# Patient Record
Sex: MALE | Race: WHITE | NOT HISPANIC OR LATINO
[De-identification: names, ages, dates, MRNs, and addresses within clinical notes are randomized per-mention and may not be internally consistent; named-entity substitution may affect disease eponyms.]

---

## 2017-06-02 ENCOUNTER — APPOINTMENT (OUTPATIENT)
Dept: RADIOLOGY | Facility: HOSPITAL | Age: 72
End: 2017-06-02

## 2017-06-02 ENCOUNTER — OUTPATIENT (OUTPATIENT)
Dept: OUTPATIENT SERVICES | Facility: HOSPITAL | Age: 72
LOS: 1 days | End: 2017-06-02
Payer: COMMERCIAL

## 2017-06-02 PROCEDURE — 74000: CPT | Mod: 26

## 2017-06-02 PROCEDURE — 74018 RADEX ABDOMEN 1 VIEW: CPT

## 2017-06-15 ENCOUNTER — OUTPATIENT (OUTPATIENT)
Dept: OUTPATIENT SERVICES | Facility: HOSPITAL | Age: 72
LOS: 1 days | End: 2017-06-15
Payer: COMMERCIAL

## 2017-06-15 ENCOUNTER — APPOINTMENT (OUTPATIENT)
Dept: CT IMAGING | Facility: HOSPITAL | Age: 72
End: 2017-06-15

## 2017-06-15 PROCEDURE — 74176 CT ABD & PELVIS W/O CONTRAST: CPT

## 2017-12-01 ENCOUNTER — APPOINTMENT (OUTPATIENT)
Dept: NEUROLOGY | Facility: CLINIC | Age: 72
End: 2017-12-01
Payer: COMMERCIAL

## 2017-12-01 VITALS
BODY MASS INDEX: 24.78 KG/M2 | HEIGHT: 66.5 IN | HEART RATE: 79 BPM | WEIGHT: 156 LBS | SYSTOLIC BLOOD PRESSURE: 118 MMHG | DIASTOLIC BLOOD PRESSURE: 78 MMHG | OXYGEN SATURATION: 98 %

## 2017-12-01 DIAGNOSIS — Z78.9 OTHER SPECIFIED HEALTH STATUS: ICD-10-CM

## 2017-12-01 DIAGNOSIS — Z87.438 PERSONAL HISTORY OF OTHER DISEASES OF MALE GENITAL ORGANS: ICD-10-CM

## 2017-12-01 DIAGNOSIS — Z82.0 FAMILY HISTORY OF EPILEPSY AND OTHER DISEASES OF THE NERVOUS SYSTEM: ICD-10-CM

## 2017-12-01 PROCEDURE — 99244 OFF/OP CNSLTJ NEW/EST MOD 40: CPT

## 2017-12-01 RX ORDER — TAMSULOSIN HYDROCHLORIDE 0.4 MG/1
0.4 CAPSULE ORAL
Refills: 0 | Status: ACTIVE | COMMUNITY

## 2017-12-04 LAB
T4 SERPL-MCNC: 7.4 UG/DL
TSH SERPL-ACNC: 1.66 UIU/ML

## 2017-12-11 ENCOUNTER — FORM ENCOUNTER (OUTPATIENT)
Age: 72
End: 2017-12-11

## 2017-12-12 ENCOUNTER — OUTPATIENT (OUTPATIENT)
Dept: OUTPATIENT SERVICES | Facility: HOSPITAL | Age: 72
LOS: 1 days | End: 2017-12-12
Payer: COMMERCIAL

## 2017-12-12 ENCOUNTER — APPOINTMENT (OUTPATIENT)
Dept: MRI IMAGING | Facility: HOSPITAL | Age: 72
End: 2017-12-12

## 2017-12-12 DIAGNOSIS — Z00.8 ENCOUNTER FOR OTHER GENERAL EXAMINATION: ICD-10-CM

## 2017-12-12 PROCEDURE — 70551 MRI BRAIN STEM W/O DYE: CPT

## 2017-12-12 PROCEDURE — 70551 MRI BRAIN STEM W/O DYE: CPT | Mod: 26

## 2018-02-01 ENCOUNTER — OUTPATIENT (OUTPATIENT)
Dept: OUTPATIENT SERVICES | Facility: HOSPITAL | Age: 73
LOS: 1 days | End: 2018-02-01
Payer: COMMERCIAL

## 2018-02-01 DIAGNOSIS — N21.0 CALCULUS IN BLADDER: ICD-10-CM

## 2018-02-01 DIAGNOSIS — Z01.818 ENCOUNTER FOR OTHER PREPROCEDURAL EXAMINATION: ICD-10-CM

## 2018-02-01 PROCEDURE — 85025 COMPLETE CBC W/AUTO DIFF WBC: CPT

## 2018-02-01 PROCEDURE — 93010 ELECTROCARDIOGRAM REPORT: CPT | Mod: NC

## 2018-02-01 PROCEDURE — G0463: CPT

## 2018-02-01 PROCEDURE — 36415 COLL VENOUS BLD VENIPUNCTURE: CPT

## 2018-02-01 PROCEDURE — 81003 URINALYSIS AUTO W/O SCOPE: CPT

## 2018-02-01 PROCEDURE — 87086 URINE CULTURE/COLONY COUNT: CPT

## 2018-02-01 PROCEDURE — 80048 BASIC METABOLIC PNL TOTAL CA: CPT

## 2018-02-01 PROCEDURE — 93005 ELECTROCARDIOGRAM TRACING: CPT

## 2018-02-06 ENCOUNTER — RX RENEWAL (OUTPATIENT)
Age: 73
End: 2018-02-06

## 2018-02-06 ENCOUNTER — APPOINTMENT (OUTPATIENT)
Dept: NEUROLOGY | Facility: CLINIC | Age: 73
End: 2018-02-06
Payer: COMMERCIAL

## 2018-02-06 VITALS
DIASTOLIC BLOOD PRESSURE: 76 MMHG | HEIGHT: 66.5 IN | HEART RATE: 64 BPM | SYSTOLIC BLOOD PRESSURE: 120 MMHG | BODY MASS INDEX: 23.82 KG/M2 | WEIGHT: 150 LBS | OXYGEN SATURATION: 98 %

## 2018-02-06 PROCEDURE — 99214 OFFICE O/P EST MOD 30 MIN: CPT

## 2018-02-08 ENCOUNTER — RX RENEWAL (OUTPATIENT)
Age: 73
End: 2018-02-08

## 2018-02-13 ENCOUNTER — RESULT REVIEW (OUTPATIENT)
Age: 73
End: 2018-02-13

## 2018-02-13 ENCOUNTER — OUTPATIENT (OUTPATIENT)
Dept: OUTPATIENT SERVICES | Facility: HOSPITAL | Age: 73
LOS: 1 days | End: 2018-02-13
Payer: COMMERCIAL

## 2018-02-13 DIAGNOSIS — N21.0 CALCULUS IN BLADDER: ICD-10-CM

## 2018-02-13 PROCEDURE — 88300 SURGICAL PATH GROSS: CPT | Mod: 26

## 2018-02-13 PROCEDURE — 82365 CALCULUS SPECTROSCOPY: CPT

## 2018-02-13 PROCEDURE — 88300 SURGICAL PATH GROSS: CPT

## 2018-02-13 PROCEDURE — 52317 REMOVE BLADDER STONE: CPT

## 2018-02-14 ENCOUNTER — TRANSCRIPTION ENCOUNTER (OUTPATIENT)
Age: 73
End: 2018-02-14

## 2018-04-06 ENCOUNTER — APPOINTMENT (OUTPATIENT)
Dept: NEUROLOGY | Facility: CLINIC | Age: 73
End: 2018-04-06

## 2018-08-26 ENCOUNTER — EMERGENCY (EMERGENCY)
Facility: HOSPITAL | Age: 73
LOS: 1 days | Discharge: HOME | End: 2018-08-26
Attending: EMERGENCY MEDICINE | Admitting: EMERGENCY MEDICINE
Payer: COMMERCIAL

## 2018-08-26 VITALS
OXYGEN SATURATION: 96 % | WEIGHT: 174.17 LBS | HEART RATE: 69 BPM | RESPIRATION RATE: 18 BRPM | TEMPERATURE: 98 F | DIASTOLIC BLOOD PRESSURE: 74 MMHG | HEIGHT: 67 IN | SYSTOLIC BLOOD PRESSURE: 133 MMHG

## 2018-08-26 VITALS
DIASTOLIC BLOOD PRESSURE: 70 MMHG | SYSTOLIC BLOOD PRESSURE: 125 MMHG | HEART RATE: 65 BPM | TEMPERATURE: 98 F | RESPIRATION RATE: 18 BRPM | OXYGEN SATURATION: 99 %

## 2018-08-26 LAB
ALBUMIN SERPL ELPH-MCNC: 4 G/DL — SIGNIFICANT CHANGE UP (ref 3.3–5)
ALP SERPL-CCNC: 80 U/L — SIGNIFICANT CHANGE UP (ref 40–120)
ALT FLD-CCNC: 36 U/L DA — SIGNIFICANT CHANGE UP (ref 10–45)
ANION GAP SERPL CALC-SCNC: 9 MMOL/L — SIGNIFICANT CHANGE UP (ref 5–17)
AST SERPL-CCNC: 22 U/L — SIGNIFICANT CHANGE UP (ref 10–40)
BASOPHILS # BLD AUTO: 0.1 K/UL — SIGNIFICANT CHANGE UP (ref 0–0.2)
BASOPHILS NFR BLD AUTO: 1.3 % — SIGNIFICANT CHANGE UP (ref 0–2)
BILIRUB SERPL-MCNC: 0.5 MG/DL — SIGNIFICANT CHANGE UP (ref 0.2–1.2)
BUN SERPL-MCNC: 21 MG/DL — SIGNIFICANT CHANGE UP (ref 7–23)
CALCIUM SERPL-MCNC: 9.1 MG/DL — SIGNIFICANT CHANGE UP (ref 8.4–10.5)
CHLORIDE SERPL-SCNC: 106 MMOL/L — SIGNIFICANT CHANGE UP (ref 96–108)
CO2 SERPL-SCNC: 26 MMOL/L — SIGNIFICANT CHANGE UP (ref 22–31)
CREAT SERPL-MCNC: 1.35 MG/DL — HIGH (ref 0.5–1.3)
EOSINOPHIL # BLD AUTO: 0.1 K/UL — SIGNIFICANT CHANGE UP (ref 0–0.5)
EOSINOPHIL NFR BLD AUTO: 1.8 % — SIGNIFICANT CHANGE UP (ref 0–6)
ETHANOL SERPL-MCNC: 211 MG/DL — HIGH (ref 0–3)
GLUCOSE SERPL-MCNC: 108 MG/DL — HIGH (ref 70–99)
HCT VFR BLD CALC: 43.6 % — SIGNIFICANT CHANGE UP (ref 39–50)
HGB BLD-MCNC: 14.5 G/DL — SIGNIFICANT CHANGE UP (ref 13–17)
LYMPHOCYTES # BLD AUTO: 1.5 K/UL — SIGNIFICANT CHANGE UP (ref 1–3.3)
LYMPHOCYTES # BLD AUTO: 29.2 % — SIGNIFICANT CHANGE UP (ref 13–44)
MCHC RBC-ENTMCNC: 29.4 PG — SIGNIFICANT CHANGE UP (ref 27–34)
MCHC RBC-ENTMCNC: 33.3 GM/DL — SIGNIFICANT CHANGE UP (ref 32–36)
MCV RBC AUTO: 88.3 FL — SIGNIFICANT CHANGE UP (ref 80–100)
MONOCYTES # BLD AUTO: 0.5 K/UL — SIGNIFICANT CHANGE UP (ref 0–0.9)
MONOCYTES NFR BLD AUTO: 10.4 % — SIGNIFICANT CHANGE UP (ref 2–14)
NEUTROPHILS # BLD AUTO: 3 K/UL — SIGNIFICANT CHANGE UP (ref 1.8–7.4)
NEUTROPHILS NFR BLD AUTO: 57.3 % — SIGNIFICANT CHANGE UP (ref 43–77)
PLATELET # BLD AUTO: 118 K/UL — LOW (ref 150–400)
POTASSIUM SERPL-MCNC: 3.6 MMOL/L — SIGNIFICANT CHANGE UP (ref 3.5–5.3)
POTASSIUM SERPL-SCNC: 3.6 MMOL/L — SIGNIFICANT CHANGE UP (ref 3.5–5.3)
PROT SERPL-MCNC: 7.5 G/DL — SIGNIFICANT CHANGE UP (ref 6–8.3)
RBC # BLD: 4.94 M/UL — SIGNIFICANT CHANGE UP (ref 4.2–5.8)
RBC # FLD: 12.7 % — SIGNIFICANT CHANGE UP (ref 10.3–14.5)
SODIUM SERPL-SCNC: 141 MMOL/L — SIGNIFICANT CHANGE UP (ref 135–145)
TROPONIN I SERPL-MCNC: <.017 NG/ML — LOW (ref 0.02–0.06)
WBC # BLD: 5.2 K/UL — SIGNIFICANT CHANGE UP (ref 3.8–10.5)
WBC # FLD AUTO: 5.2 K/UL — SIGNIFICANT CHANGE UP (ref 3.8–10.5)

## 2018-08-26 PROCEDURE — 84484 ASSAY OF TROPONIN QUANT: CPT

## 2018-08-26 PROCEDURE — 93010 ELECTROCARDIOGRAM REPORT: CPT

## 2018-08-26 PROCEDURE — 99285 EMERGENCY DEPT VISIT HI MDM: CPT | Mod: 25

## 2018-08-26 PROCEDURE — 80307 DRUG TEST PRSMV CHEM ANLYZR: CPT

## 2018-08-26 PROCEDURE — 93005 ELECTROCARDIOGRAM TRACING: CPT

## 2018-08-26 PROCEDURE — 85027 COMPLETE CBC AUTOMATED: CPT

## 2018-08-26 PROCEDURE — 99284 EMERGENCY DEPT VISIT MOD MDM: CPT | Mod: 25

## 2018-08-26 PROCEDURE — 70450 CT HEAD/BRAIN W/O DYE: CPT | Mod: 26

## 2018-08-26 PROCEDURE — 70450 CT HEAD/BRAIN W/O DYE: CPT

## 2018-08-26 PROCEDURE — 80053 COMPREHEN METABOLIC PANEL: CPT

## 2018-08-26 RX ORDER — SODIUM CHLORIDE 9 MG/ML
3 INJECTION INTRAMUSCULAR; INTRAVENOUS; SUBCUTANEOUS ONCE
Qty: 0 | Refills: 0 | Status: COMPLETED | OUTPATIENT
Start: 2018-08-26 | End: 2018-08-26

## 2018-08-26 RX ADMIN — SODIUM CHLORIDE 3 MILLILITER(S): 9 INJECTION INTRAMUSCULAR; INTRAVENOUS; SUBCUTANEOUS at 04:33

## 2018-08-26 NOTE — ED PROVIDER NOTE - MEDICAL DECISION MAKING DETAILS
pt with etoh intoxication and questionable history of syncope, pt with bifasicular block on ekg which is old and two neg trops

## 2018-08-26 NOTE — ED PROVIDER NOTE - OBJECTIVE STATEMENT
72 year old M was found at the bottom of stairs after drinking.  Pt's wife is not sure if he synopsized.  Pt's wife is unsure of the circumstances.  Pt is without acute complaints.

## 2019-05-27 ENCOUNTER — EMERGENCY (EMERGENCY)
Facility: HOSPITAL | Age: 74
LOS: 1 days | Discharge: HOME | End: 2019-05-27
Attending: EMERGENCY MEDICINE | Admitting: EMERGENCY MEDICINE
Payer: COMMERCIAL

## 2019-05-27 VITALS
OXYGEN SATURATION: 97 % | RESPIRATION RATE: 17 BRPM | HEIGHT: 66 IN | DIASTOLIC BLOOD PRESSURE: 77 MMHG | SYSTOLIC BLOOD PRESSURE: 138 MMHG | HEART RATE: 82 BPM | WEIGHT: 149.91 LBS | TEMPERATURE: 99 F

## 2019-05-27 DIAGNOSIS — R55 SYNCOPE AND COLLAPSE: ICD-10-CM

## 2019-05-27 LAB
ALBUMIN SERPL ELPH-MCNC: 3.9 G/DL — SIGNIFICANT CHANGE UP (ref 3.3–5)
ALP SERPL-CCNC: 70 U/L — SIGNIFICANT CHANGE UP (ref 40–120)
ALT FLD-CCNC: 44 U/L DA — SIGNIFICANT CHANGE UP (ref 10–45)
ANION GAP SERPL CALC-SCNC: 12 MMOL/L — SIGNIFICANT CHANGE UP (ref 5–17)
APTT BLD: 27 SEC — LOW (ref 27.5–36.3)
AST SERPL-CCNC: 66 U/L — HIGH (ref 10–40)
BILIRUB SERPL-MCNC: 2.2 MG/DL — HIGH (ref 0.2–1.2)
BUN SERPL-MCNC: 24 MG/DL — HIGH (ref 7–23)
CALCIUM SERPL-MCNC: 9 MG/DL — SIGNIFICANT CHANGE UP (ref 8.4–10.5)
CHLORIDE SERPL-SCNC: 102 MMOL/L — SIGNIFICANT CHANGE UP (ref 96–108)
CK SERPL-CCNC: 1614 U/L — HIGH (ref 30–200)
CO2 SERPL-SCNC: 25 MMOL/L — SIGNIFICANT CHANGE UP (ref 22–31)
CREAT SERPL-MCNC: 1.09 MG/DL — SIGNIFICANT CHANGE UP (ref 0.5–1.3)
GLUCOSE SERPL-MCNC: 121 MG/DL — HIGH (ref 70–99)
HCT VFR BLD CALC: 35.9 % — LOW (ref 39–50)
HGB BLD-MCNC: 12 G/DL — LOW (ref 13–17)
INR BLD: 1.12 RATIO — SIGNIFICANT CHANGE UP (ref 0.88–1.16)
MCHC RBC-ENTMCNC: 28.3 PG — SIGNIFICANT CHANGE UP (ref 27–34)
MCHC RBC-ENTMCNC: 33.4 GM/DL — SIGNIFICANT CHANGE UP (ref 32–36)
MCV RBC AUTO: 84.7 FL — SIGNIFICANT CHANGE UP (ref 80–100)
NRBC # BLD: 0 /100 WBCS — SIGNIFICANT CHANGE UP (ref 0–0)
PLATELET # BLD AUTO: 122 K/UL — LOW (ref 150–400)
POTASSIUM SERPL-MCNC: 3.6 MMOL/L — SIGNIFICANT CHANGE UP (ref 3.5–5.3)
POTASSIUM SERPL-SCNC: 3.6 MMOL/L — SIGNIFICANT CHANGE UP (ref 3.5–5.3)
PROT SERPL-MCNC: 7.1 G/DL — SIGNIFICANT CHANGE UP (ref 6–8.3)
PROTHROM AB SERPL-ACNC: 12.6 SEC — SIGNIFICANT CHANGE UP (ref 10–12.9)
RBC # BLD: 4.24 M/UL — SIGNIFICANT CHANGE UP (ref 4.2–5.8)
RBC # FLD: 13.7 % — SIGNIFICANT CHANGE UP (ref 10.3–14.5)
SODIUM SERPL-SCNC: 139 MMOL/L — SIGNIFICANT CHANGE UP (ref 135–145)
TROPONIN I SERPL-MCNC: 0.02 NG/ML — SIGNIFICANT CHANGE UP (ref 0.02–0.06)
WBC # BLD: 8.33 K/UL — SIGNIFICANT CHANGE UP (ref 3.8–10.5)
WBC # FLD AUTO: 8.33 K/UL — SIGNIFICANT CHANGE UP (ref 3.8–10.5)

## 2019-05-27 PROCEDURE — 99292 CRITICAL CARE ADDL 30 MIN: CPT

## 2019-05-27 PROCEDURE — 99291 CRITICAL CARE FIRST HOUR: CPT

## 2019-05-27 PROCEDURE — 93010 ELECTROCARDIOGRAM REPORT: CPT

## 2019-05-27 PROCEDURE — 72125 CT NECK SPINE W/O DYE: CPT | Mod: 26

## 2019-05-27 PROCEDURE — 73090 X-RAY EXAM OF FOREARM: CPT | Mod: 26,50

## 2019-05-27 PROCEDURE — 71045 X-RAY EXAM CHEST 1 VIEW: CPT | Mod: 26

## 2019-05-27 PROCEDURE — 73030 X-RAY EXAM OF SHOULDER: CPT | Mod: 26,RT

## 2019-05-27 PROCEDURE — 70450 CT HEAD/BRAIN W/O DYE: CPT | Mod: 26

## 2019-05-27 RX ORDER — SODIUM CHLORIDE 9 MG/ML
1000 INJECTION INTRAMUSCULAR; INTRAVENOUS; SUBCUTANEOUS
Refills: 0 | Status: DISCONTINUED | OUTPATIENT
Start: 2019-05-27 | End: 2019-05-31

## 2019-05-27 RX ORDER — SODIUM CHLORIDE 9 MG/ML
1000 INJECTION INTRAMUSCULAR; INTRAVENOUS; SUBCUTANEOUS ONCE
Refills: 0 | Status: COMPLETED | OUTPATIENT
Start: 2019-05-27 | End: 2019-05-27

## 2019-05-27 RX ADMIN — SODIUM CHLORIDE 1000 MILLILITER(S): 9 INJECTION INTRAMUSCULAR; INTRAVENOUS; SUBCUTANEOUS at 23:01

## 2019-05-27 NOTE — ED ADULT NURSE NOTE - OBJECTIVE STATEMENT
Pt is alert, he attended an evening party with a neighbor yesterday. and had some drink and went home. Today around 6 PM .he went back to his neighbor's house looking for his glasses . Neighbor noted that he is altered mental status and stated that he must have fallen because of his blood on his scalp and bruises and discolorations on his upper extremities. but does not remember the fall. At the same time, pt was also noted to have trouble finding words to express himself which still noted upon arrival to the ER. [place] to  the patient"s [place]. [place] he was [place] to the ER.

## 2019-05-27 NOTE — ED ADULT NURSE NOTE - NSIMPLEMENTINTERV_GEN_ALL_ED
Implemented All Universal Safety Interventions:  [place] to call system. Call bell, personal items and telephone within reach. Instruct patient to call for assistance. Room bathroom lighting operational. Non-slip footwear when patient is off stretcher. Physically safe environment: no spills, clutter or unnecessary equipment. Stretcher in lowest position, wheels locked, appropriate side rails in place.

## 2019-05-27 NOTE — ED PROVIDER NOTE - PROGRESS NOTE DETAILS
During stay, pt found confused. No visible seizure activity witnessed but he appears post ictal at this time. Proph dose/load of Keppra given and ativan to calm down level of agitation. GCS 13. Stable for transfer. Family at bedside.

## 2019-05-27 NOTE — ED PROVIDER NOTE - CLINICAL SUMMARY MEDICAL DECISION MAKING FREE TEXT BOX
Patient brought in by family because he is not normal.   The patient spent the evening by his friend and had too much Don Jorge Alberto liquor. He went home and no one saw him until this evening at 6pm, when he returned to his friend's home. He was scheduled to  his wife at 8pm.   When he arrived at his friend's home, she noted that he had dried blood on his scalp and he wasn't able to complete his sentences. They drove to get his wife from the airport and he had trouble reading the signs. He is oriented. He has bruising all over the back and shoulder (right side). No blood thinners. Take Finasteride or Flomax. PCP Dr Fontaine.     Pt does not recall details from last night but says this morning at 8am, he awoke on the floor. There was blood and the wrought iron chairs were toppled over. He woke up and went to bed, slept the day off and then awoke, tried to clean up and then left the house. No vomiting. No visual changes.    Pt with multiple areas of bruising. Signs of head and upper body injury. Pt cannot complete sentence. He is able to identify simple objects. Cannot repeat many phrases. He can attempt to give his recollection of his days' events but at times, cannot complete his thought.

## 2019-05-27 NOTE — ED PROVIDER NOTE - CARE PLAN
Principal Discharge DX:	Traumatic brain injury with loss of consciousness, initial encounter  Secondary Diagnosis:	Abrasions of multiple sites  Secondary Diagnosis:	Confusion Principal Discharge DX:	Traumatic brain injury with loss of consciousness, initial encounter  Secondary Diagnosis:	Abrasions of multiple sites  Secondary Diagnosis:	Confusion  Secondary Diagnosis:	Traumatic rhabdomyolysis, initial encounter

## 2019-05-27 NOTE — ED PROVIDER NOTE - CRITICAL CARE PROVIDED
consult w/ pt's family directly relating to pts condition/additional history taking/consultation with other physicians/direct patient care (not related to procedure)/documentation/interpretation of diagnostic studies

## 2019-05-27 NOTE — ED ADULT TRIAGE NOTE - CHIEF COMPLAINT QUOTE
As per pt he fell last night, found himself on the floor, bilateral upper extremities with skin tear/redness and swelling, also stated he has a cut on his head, possible syncope, also c/o intermittent headache

## 2019-05-27 NOTE — ED PROVIDER NOTE - OBJECTIVE STATEMENT
Patient brought in by family because he is not normal.   The patient spent the evening by his friend and had too much Don Jorge Alberto liquor. He went home and no one saw him until this evening at 6pm, when he returned to his friend's home. He was scheduled to  his wife at 8pm. Patient brought in by family because he is not normal.   The patient spent the evening by his friend and had too much Don Jorge Alberto liquor. He went home and no one saw him until this evening at 6pm, when he returned to his friend's home. He was scheduled to  his wife at 8pm.   When he arrived at his friend's home, she noted that he had dried blood on his scalp and he wasn't able to complete his sentences. They drove to get his wife from the airport and he had trouble reading the signs. He is oriented. He has bruising all over the back and shoulder (right side). No blood thinners. Take Finasteride or Flomax. PCP Dr Fontaine.     Pt does not recall details from last night but says this morning at 8am, he awoke on the floor. There was blood and the wrought iron chairs were toppled over. He woke up and went to bed, slept the day off and then awoke, tried to clean up and then left the house. No vomiting. No visual changes.

## 2019-05-28 ENCOUNTER — INPATIENT (INPATIENT)
Facility: HOSPITAL | Age: 74
LOS: 1 days | Discharge: HOME | DRG: 86 | End: 2019-05-30
Attending: SURGERY | Admitting: SURGERY
Payer: COMMERCIAL

## 2019-05-28 VITALS
OXYGEN SATURATION: 96 % | HEART RATE: 80 BPM | HEIGHT: 76 IN | RESPIRATION RATE: 14 BRPM | WEIGHT: 150.36 LBS | DIASTOLIC BLOOD PRESSURE: 73 MMHG | SYSTOLIC BLOOD PRESSURE: 151 MMHG | TEMPERATURE: 98 F

## 2019-05-28 VITALS
DIASTOLIC BLOOD PRESSURE: 74 MMHG | HEART RATE: 95 BPM | SYSTOLIC BLOOD PRESSURE: 177 MMHG | OXYGEN SATURATION: 98 % | RESPIRATION RATE: 21 BRPM

## 2019-05-28 DIAGNOSIS — S06.6X9A TRAUMATIC SUBARACHNOID HEMORRHAGE WITH LOSS OF CONSCIOUSNESS OF UNSPECIFIED DURATION, INITIAL ENCOUNTER: ICD-10-CM

## 2019-05-28 DIAGNOSIS — S06.5X9A TRAUMATIC SUBDURAL HEMORRHAGE WITH LOSS OF CONSCIOUSNESS OF UNSPECIFIED DURATION, INITIAL ENCOUNTER: ICD-10-CM

## 2019-05-28 DIAGNOSIS — S06.309A UNSPECIFIED FOCAL TRAUMATIC BRAIN INJURY WITH LOSS OF CONSCIOUSNESS OF UNSPECIFIED DURATION, INITIAL ENCOUNTER: ICD-10-CM

## 2019-05-28 LAB
ABO RH CONFIRMATION: SIGNIFICANT CHANGE UP
ALBUMIN SERPL ELPH-MCNC: 3.7 G/DL — SIGNIFICANT CHANGE UP (ref 3.3–5.2)
ALBUMIN SERPL ELPH-MCNC: 3.9 G/DL — SIGNIFICANT CHANGE UP (ref 3.3–5.2)
ALP SERPL-CCNC: 61 U/L — SIGNIFICANT CHANGE UP (ref 40–120)
ALP SERPL-CCNC: 62 U/L — SIGNIFICANT CHANGE UP (ref 40–120)
ALT FLD-CCNC: 28 U/L — SIGNIFICANT CHANGE UP
ALT FLD-CCNC: 30 U/L — SIGNIFICANT CHANGE UP
ANION GAP SERPL CALC-SCNC: 11 MMOL/L — SIGNIFICANT CHANGE UP (ref 5–17)
ANION GAP SERPL CALC-SCNC: 12 MMOL/L — SIGNIFICANT CHANGE UP (ref 5–17)
ANION GAP SERPL CALC-SCNC: 12 MMOL/L — SIGNIFICANT CHANGE UP (ref 5–17)
APPEARANCE UR: CLEAR — SIGNIFICANT CHANGE UP
APTT BLD: 24.8 SEC — LOW (ref 27.5–36.3)
AST SERPL-CCNC: 57 U/L — HIGH
AST SERPL-CCNC: 64 U/L — HIGH
BASOPHILS # BLD AUTO: 0 K/UL — SIGNIFICANT CHANGE UP (ref 0–0.2)
BASOPHILS NFR BLD AUTO: 0.1 % — SIGNIFICANT CHANGE UP (ref 0–2)
BILIRUB DIRECT SERPL-MCNC: 0.3 MG/DL — SIGNIFICANT CHANGE UP (ref 0–0.3)
BILIRUB INDIRECT FLD-MCNC: 1.6 MG/DL — HIGH (ref 0.2–1)
BILIRUB SERPL-MCNC: 1.7 MG/DL — SIGNIFICANT CHANGE UP (ref 0.4–2)
BILIRUB SERPL-MCNC: 1.9 MG/DL — SIGNIFICANT CHANGE UP (ref 0.4–2)
BILIRUB UR-MCNC: NEGATIVE — SIGNIFICANT CHANGE UP
BLD GP AB SCN SERPL QL: SIGNIFICANT CHANGE UP
BUN SERPL-MCNC: 13 MG/DL — SIGNIFICANT CHANGE UP (ref 8–20)
BUN SERPL-MCNC: 15 MG/DL — SIGNIFICANT CHANGE UP (ref 8–20)
BUN SERPL-MCNC: 20 MG/DL — SIGNIFICANT CHANGE UP (ref 8–20)
CALCIUM SERPL-MCNC: 8.6 MG/DL — SIGNIFICANT CHANGE UP (ref 8.6–10.2)
CALCIUM SERPL-MCNC: 8.7 MG/DL — SIGNIFICANT CHANGE UP (ref 8.6–10.2)
CALCIUM SERPL-MCNC: 8.7 MG/DL — SIGNIFICANT CHANGE UP (ref 8.6–10.2)
CHLORIDE SERPL-SCNC: 103 MMOL/L — SIGNIFICANT CHANGE UP (ref 98–107)
CHLORIDE SERPL-SCNC: 106 MMOL/L — SIGNIFICANT CHANGE UP (ref 98–107)
CHLORIDE SERPL-SCNC: 107 MMOL/L — SIGNIFICANT CHANGE UP (ref 98–107)
CO2 SERPL-SCNC: 22 MMOL/L — SIGNIFICANT CHANGE UP (ref 22–29)
CO2 SERPL-SCNC: 23 MMOL/L — SIGNIFICANT CHANGE UP (ref 22–29)
CO2 SERPL-SCNC: 24 MMOL/L — SIGNIFICANT CHANGE UP (ref 22–29)
COLOR SPEC: YELLOW — SIGNIFICANT CHANGE UP
CREAT SERPL-MCNC: 0.62 MG/DL — SIGNIFICANT CHANGE UP (ref 0.5–1.3)
CREAT SERPL-MCNC: 0.82 MG/DL — SIGNIFICANT CHANGE UP (ref 0.5–1.3)
CREAT SERPL-MCNC: 0.88 MG/DL — SIGNIFICANT CHANGE UP (ref 0.5–1.3)
DIFF PNL FLD: ABNORMAL
EOSINOPHIL # BLD AUTO: 0 K/UL — SIGNIFICANT CHANGE UP (ref 0–0.5)
EOSINOPHIL NFR BLD AUTO: 0.1 % — SIGNIFICANT CHANGE UP (ref 0–5)
EPI CELLS # UR: SIGNIFICANT CHANGE UP
GLUCOSE SERPL-MCNC: 100 MG/DL — SIGNIFICANT CHANGE UP (ref 70–115)
GLUCOSE SERPL-MCNC: 110 MG/DL — SIGNIFICANT CHANGE UP (ref 70–115)
GLUCOSE SERPL-MCNC: 136 MG/DL — HIGH (ref 70–115)
GLUCOSE UR QL: 50 MG/DL
HCT VFR BLD CALC: 33.5 % — LOW (ref 42–52)
HGB BLD-MCNC: 11.3 G/DL — LOW (ref 14–18)
INR BLD: 1.09 RATIO — SIGNIFICANT CHANGE UP (ref 0.88–1.16)
KETONES UR-MCNC: ABNORMAL
LACTATE SERPL-SCNC: 1.2 MMOL/L — SIGNIFICANT CHANGE UP (ref 0.5–2)
LEUKOCYTE ESTERASE UR-ACNC: NEGATIVE — SIGNIFICANT CHANGE UP
LIDOCAIN IGE QN: 18 U/L — LOW (ref 22–51)
LYMPHOCYTES # BLD AUTO: 0.8 K/UL — LOW (ref 1–4.8)
LYMPHOCYTES # BLD AUTO: 10.8 % — LOW (ref 20–55)
MCHC RBC-ENTMCNC: 28.3 PG — SIGNIFICANT CHANGE UP (ref 27–31)
MCHC RBC-ENTMCNC: 33.7 G/DL — SIGNIFICANT CHANGE UP (ref 32–36)
MCV RBC AUTO: 84 FL — SIGNIFICANT CHANGE UP (ref 80–94)
MONOCYTES # BLD AUTO: 0.8 K/UL — SIGNIFICANT CHANGE UP (ref 0–0.8)
MONOCYTES NFR BLD AUTO: 10.9 % — HIGH (ref 3–10)
NEUTROPHILS # BLD AUTO: 5.6 K/UL — SIGNIFICANT CHANGE UP (ref 1.8–8)
NEUTROPHILS NFR BLD AUTO: 77.7 % — HIGH (ref 37–73)
NITRITE UR-MCNC: NEGATIVE — SIGNIFICANT CHANGE UP
OSMOLALITY SERPL: 291 MOSM/KG — SIGNIFICANT CHANGE UP (ref 280–300)
OSMOLALITY SERPL: 298 MOSM/KG — SIGNIFICANT CHANGE UP (ref 280–300)
PH UR: 6 — SIGNIFICANT CHANGE UP (ref 5–8)
PLATELET # BLD AUTO: 107 K/UL — LOW (ref 150–400)
POTASSIUM SERPL-MCNC: 3.8 MMOL/L — SIGNIFICANT CHANGE UP (ref 3.5–5.3)
POTASSIUM SERPL-MCNC: 3.8 MMOL/L — SIGNIFICANT CHANGE UP (ref 3.5–5.3)
POTASSIUM SERPL-MCNC: 3.9 MMOL/L — SIGNIFICANT CHANGE UP (ref 3.5–5.3)
POTASSIUM SERPL-SCNC: 3.8 MMOL/L — SIGNIFICANT CHANGE UP (ref 3.5–5.3)
POTASSIUM SERPL-SCNC: 3.8 MMOL/L — SIGNIFICANT CHANGE UP (ref 3.5–5.3)
POTASSIUM SERPL-SCNC: 3.9 MMOL/L — SIGNIFICANT CHANGE UP (ref 3.5–5.3)
PROT SERPL-MCNC: 6 G/DL — LOW (ref 6.6–8.7)
PROT SERPL-MCNC: 6.2 G/DL — LOW (ref 6.6–8.7)
PROT UR-MCNC: 30 MG/DL
PROTHROM AB SERPL-ACNC: 12.6 SEC — SIGNIFICANT CHANGE UP (ref 10–12.9)
RBC # BLD: 3.99 M/UL — LOW (ref 4.6–6.2)
RBC # FLD: 14.2 % — SIGNIFICANT CHANGE UP (ref 11–15.6)
RBC CASTS # UR COMP ASSIST: ABNORMAL /HPF (ref 0–4)
SODIUM SERPL-SCNC: 138 MMOL/L — SIGNIFICANT CHANGE UP (ref 135–145)
SODIUM SERPL-SCNC: 139 MMOL/L — SIGNIFICANT CHANGE UP (ref 135–145)
SODIUM SERPL-SCNC: 143 MMOL/L — SIGNIFICANT CHANGE UP (ref 135–145)
SP GR SPEC: 1.02 — SIGNIFICANT CHANGE UP (ref 1.01–1.02)
TYPE + AB SCN PNL BLD: SIGNIFICANT CHANGE UP
UROBILINOGEN FLD QL: NEGATIVE MG/DL — SIGNIFICANT CHANGE UP
WBC # BLD: 7.2 K/UL — SIGNIFICANT CHANGE UP (ref 4.8–10.8)
WBC # FLD AUTO: 7.2 K/UL — SIGNIFICANT CHANGE UP (ref 4.8–10.8)
WBC UR QL: SIGNIFICANT CHANGE UP

## 2019-05-28 PROCEDURE — 73110 X-RAY EXAM OF WRIST: CPT | Mod: 26,RT

## 2019-05-28 PROCEDURE — 71045 X-RAY EXAM CHEST 1 VIEW: CPT | Mod: 26

## 2019-05-28 PROCEDURE — 93010 ELECTROCARDIOGRAM REPORT: CPT | Mod: 76

## 2019-05-28 PROCEDURE — 99285 EMERGENCY DEPT VISIT HI MDM: CPT

## 2019-05-28 PROCEDURE — 80053 COMPREHEN METABOLIC PANEL: CPT

## 2019-05-28 PROCEDURE — 99222 1ST HOSP IP/OBS MODERATE 55: CPT

## 2019-05-28 PROCEDURE — 70450 CT HEAD/BRAIN W/O DYE: CPT

## 2019-05-28 PROCEDURE — 85027 COMPLETE CBC AUTOMATED: CPT

## 2019-05-28 PROCEDURE — 96375 TX/PRO/DX INJ NEW DRUG ADDON: CPT

## 2019-05-28 PROCEDURE — 85610 PROTHROMBIN TIME: CPT

## 2019-05-28 PROCEDURE — 70450 CT HEAD/BRAIN W/O DYE: CPT | Mod: 26,76

## 2019-05-28 PROCEDURE — 72125 CT NECK SPINE W/O DYE: CPT

## 2019-05-28 PROCEDURE — 36415 COLL VENOUS BLD VENIPUNCTURE: CPT

## 2019-05-28 PROCEDURE — 73090 X-RAY EXAM OF FOREARM: CPT

## 2019-05-28 PROCEDURE — 99291 CRITICAL CARE FIRST HOUR: CPT | Mod: 25

## 2019-05-28 PROCEDURE — 85730 THROMBOPLASTIN TIME PARTIAL: CPT

## 2019-05-28 PROCEDURE — 73130 X-RAY EXAM OF HAND: CPT | Mod: 26,RT

## 2019-05-28 PROCEDURE — 82550 ASSAY OF CK (CPK): CPT

## 2019-05-28 PROCEDURE — 93005 ELECTROCARDIOGRAM TRACING: CPT

## 2019-05-28 PROCEDURE — 73030 X-RAY EXAM OF SHOULDER: CPT

## 2019-05-28 PROCEDURE — 73080 X-RAY EXAM OF ELBOW: CPT | Mod: 26,RT

## 2019-05-28 PROCEDURE — 99221 1ST HOSP IP/OBS SF/LOW 40: CPT

## 2019-05-28 PROCEDURE — 73060 X-RAY EXAM OF HUMERUS: CPT | Mod: 26,RT

## 2019-05-28 PROCEDURE — 96374 THER/PROPH/DIAG INJ IV PUSH: CPT

## 2019-05-28 PROCEDURE — 82962 GLUCOSE BLOOD TEST: CPT

## 2019-05-28 PROCEDURE — 84484 ASSAY OF TROPONIN QUANT: CPT

## 2019-05-28 PROCEDURE — 71045 X-RAY EXAM CHEST 1 VIEW: CPT

## 2019-05-28 PROCEDURE — 99292 CRITICAL CARE ADDL 30 MIN: CPT | Mod: 25

## 2019-05-28 PROCEDURE — 99232 SBSQ HOSP IP/OBS MODERATE 35: CPT

## 2019-05-28 PROCEDURE — 73030 X-RAY EXAM OF SHOULDER: CPT | Mod: 26,RT

## 2019-05-28 RX ORDER — LEVETIRACETAM 250 MG/1
500 TABLET, FILM COATED ORAL EVERY 12 HOURS
Refills: 0 | Status: DISCONTINUED | OUTPATIENT
Start: 2019-05-28 | End: 2019-05-30

## 2019-05-28 RX ORDER — HYDRALAZINE HCL 50 MG
10 TABLET ORAL ONCE
Refills: 0 | Status: COMPLETED | OUTPATIENT
Start: 2019-05-28 | End: 2019-05-28

## 2019-05-28 RX ORDER — TETANUS TOXOID, REDUCED DIPHTHERIA TOXOID AND ACELLULAR PERTUSSIS VACCINE, ADSORBED 5; 2.5; 8; 8; 2.5 [IU]/.5ML; [IU]/.5ML; UG/.5ML; UG/.5ML; UG/.5ML
0.5 SUSPENSION INTRAMUSCULAR ONCE
Refills: 0 | Status: COMPLETED | OUTPATIENT
Start: 2019-05-28 | End: 2019-05-28

## 2019-05-28 RX ORDER — TAMSULOSIN HYDROCHLORIDE 0.4 MG/1
0.4 CAPSULE ORAL AT BEDTIME
Refills: 0 | Status: DISCONTINUED | OUTPATIENT
Start: 2019-05-28 | End: 2019-05-29

## 2019-05-28 RX ORDER — SODIUM CHLORIDE 9 MG/ML
1000 INJECTION INTRAMUSCULAR; INTRAVENOUS; SUBCUTANEOUS ONCE
Refills: 0 | Status: COMPLETED | OUTPATIENT
Start: 2019-05-28 | End: 2019-05-28

## 2019-05-28 RX ORDER — LEVETIRACETAM 250 MG/1
500 TABLET, FILM COATED ORAL
Refills: 0 | Status: DISCONTINUED | OUTPATIENT
Start: 2019-05-28 | End: 2019-05-28

## 2019-05-28 RX ORDER — ACETAMINOPHEN 500 MG
650 TABLET ORAL EVERY 6 HOURS
Refills: 0 | Status: DISCONTINUED | OUTPATIENT
Start: 2019-05-28 | End: 2019-05-30

## 2019-05-28 RX ORDER — LEVETIRACETAM 250 MG/1
1000 TABLET, FILM COATED ORAL ONCE
Refills: 0 | Status: COMPLETED | OUTPATIENT
Start: 2019-05-28 | End: 2019-05-28

## 2019-05-28 RX ORDER — HYDROMORPHONE HYDROCHLORIDE 2 MG/ML
0.5 INJECTION INTRAMUSCULAR; INTRAVENOUS; SUBCUTANEOUS EVERY 4 HOURS
Refills: 0 | Status: DISCONTINUED | OUTPATIENT
Start: 2019-05-28 | End: 2019-05-30

## 2019-05-28 RX ORDER — POTASSIUM CHLORIDE 20 MEQ
10 PACKET (EA) ORAL
Refills: 0 | Status: COMPLETED | OUTPATIENT
Start: 2019-05-28 | End: 2019-05-28

## 2019-05-28 RX ORDER — CHLORHEXIDINE GLUCONATE 213 G/1000ML
1 SOLUTION TOPICAL DAILY
Refills: 0 | Status: DISCONTINUED | OUTPATIENT
Start: 2019-05-28 | End: 2019-05-30

## 2019-05-28 RX ORDER — THIAMINE MONONITRATE (VIT B1) 100 MG
500 TABLET ORAL DAILY
Refills: 0 | Status: DISCONTINUED | OUTPATIENT
Start: 2019-05-28 | End: 2019-05-30

## 2019-05-28 RX ORDER — SODIUM CHLORIDE 5 G/100ML
1000 INJECTION, SOLUTION INTRAVENOUS
Refills: 0 | Status: DISCONTINUED | OUTPATIENT
Start: 2019-05-28 | End: 2019-05-30

## 2019-05-28 RX ORDER — FENOFIBRATE,MICRONIZED 130 MG
1 CAPSULE ORAL
Qty: 0 | Refills: 0 | DISCHARGE

## 2019-05-28 RX ORDER — FOLIC ACID 0.8 MG
1 TABLET ORAL DAILY
Refills: 0 | Status: DISCONTINUED | OUTPATIENT
Start: 2019-05-28 | End: 2019-05-30

## 2019-05-28 RX ADMIN — Medication 10 MILLIGRAM(S): at 05:15

## 2019-05-28 RX ADMIN — CHLORHEXIDINE GLUCONATE 1 APPLICATION(S): 213 SOLUTION TOPICAL at 11:22

## 2019-05-28 RX ADMIN — SODIUM CHLORIDE 200 MILLILITER(S): 9 INJECTION INTRAMUSCULAR; INTRAVENOUS; SUBCUTANEOUS at 00:51

## 2019-05-28 RX ADMIN — SODIUM CHLORIDE 30 MILLILITER(S): 5 INJECTION, SOLUTION INTRAVENOUS at 06:33

## 2019-05-28 RX ADMIN — Medication 100 MILLIEQUIVALENT(S): at 06:33

## 2019-05-28 RX ADMIN — Medication 1 MILLIGRAM(S): at 00:35

## 2019-05-28 RX ADMIN — Medication 650 MILLIGRAM(S): at 08:30

## 2019-05-28 RX ADMIN — LEVETIRACETAM 420 MILLIGRAM(S): 250 TABLET, FILM COATED ORAL at 21:00

## 2019-05-28 RX ADMIN — Medication 105 MILLIGRAM(S): at 11:25

## 2019-05-28 RX ADMIN — TETANUS TOXOID, REDUCED DIPHTHERIA TOXOID AND ACELLULAR PERTUSSIS VACCINE, ADSORBED 0.5 MILLILITER(S): 5; 2.5; 8; 8; 2.5 SUSPENSION INTRAMUSCULAR at 07:41

## 2019-05-28 RX ADMIN — TAMSULOSIN HYDROCHLORIDE 0.4 MILLIGRAM(S): 0.4 CAPSULE ORAL at 21:40

## 2019-05-28 RX ADMIN — Medication 100 MILLIEQUIVALENT(S): at 08:52

## 2019-05-28 RX ADMIN — LEVETIRACETAM 420 MILLIGRAM(S): 250 TABLET, FILM COATED ORAL at 08:52

## 2019-05-28 RX ADMIN — Medication 650 MILLIGRAM(S): at 07:35

## 2019-05-28 RX ADMIN — Medication 1 MILLIGRAM(S): at 00:39

## 2019-05-28 RX ADMIN — LEVETIRACETAM 400 MILLIGRAM(S): 250 TABLET, FILM COATED ORAL at 00:50

## 2019-05-28 RX ADMIN — Medication 1 MILLIGRAM(S): at 11:25

## 2019-05-28 RX ADMIN — SODIUM CHLORIDE 2000 MILLILITER(S): 9 INJECTION INTRAMUSCULAR; INTRAVENOUS; SUBCUTANEOUS at 05:00

## 2019-05-28 RX ADMIN — Medication 100 MILLIEQUIVALENT(S): at 07:41

## 2019-05-28 NOTE — ED ADULT NURSE NOTE - CHIEF COMPLAINT QUOTE
patient chiquis from [facility] for a trauma transfer. As per report from [place] patient was at a party saturday when he fell, brought to [place] yesterday for AMS patient with a subdural bleed and altered mental status. patient alert and aphasic as per ems prior to transfer patient became combative and agitated and received 2mg of ativan for transport. patient had seizure at [place]. patient not answering questions. Dr Plata at bedside code trauma B called.

## 2019-05-28 NOTE — H&P ADULT - NSHPPHYSICALEXAM_GEN_ALL_CORE
HEENT: Normocephalic, atraumatic, ELIECER, EOM wnl, no otorrhea or hemotympanum b/l, no epistaxis or d/c b/l nares, no craniofacial bony pathology or tenderness b/l  Neck:  No crepitus, no ecchymosis, no hematoma, to exam, no JVD, no tracheal deviation  Cspine/thoracolumbrosacral spine: no gross bony pathology or tenderness to exam  Cardiovascular: S1S2 Present  Chest: no gross rib pathology or tenderness to exam. No sternal pathology or tenderness to exam. No crepitus, no ecchymosis, no hematoma. No penetrating thorcoabdominal trauma  Respiratory: Rate is 18; Respiratory Effort normal; no wheezes, rales or rhonchi to exam  ABD: bowel sounds (+), soft, nontender, non distended, no rebound, no guarding, no rigidity, no skin changes to exam. No pelvic instability to exam, no skin changes  Musculoskeletal: Pt has palpable b/l radial, femoral, dorsalis pedis pulses. All digits are warm and well perfused. No gross long bone pathology or tenderness to exam. Pt demonstrates grossly intact sensoromotor function. Pt has good capillary refill to digits, no calf edema or tenderness to exam.  Extremities: 2cm Laceration to distal aspect of right forearm with muscle exposed with no active bleeding  Neurovascular: Sensation grossly intact. Moves all extremities  Skin: no lesions or rashes to exam

## 2019-05-28 NOTE — H&P ADULT - ATTENDING COMMENTS
I saw and examined patient on 5/28/1/8 at around 7am  no changes.  Appreciate neurosurgery.  repeat CT

## 2019-05-28 NOTE — H&P ADULT - ASSESSMENT
74yo male presents with subdural and subarachnoid hemorrhage    Plan:  - Admit to SICU  - Repeat CT head  - Neurosurgery consult  - Neurology consult   - Laceration repair to right forearm

## 2019-05-28 NOTE — STROKE CODE NOTE - CT READING
Intercerebral hemorrage/Subarachnoid hemorrhage/Other/traumatic subdural, subarachnoid and intraventricular hemorrhage

## 2019-05-28 NOTE — CONSULT NOTE ADULT - SUBJECTIVE AND OBJECTIVE BOX
CHIEF COMPLAINT: TBI    HPI: 73yMale  History of Present Illness:  Reason for Admission: Fall/Subdural Hemorrhage	  History of Present Illness: 	  74yo presents to [facility] after fall. Pt was transferred from [facility] after initial fall 2 days ago. He was at party Sunday night where he admitted to do drinking tequila. He had unwitnessed fall later in evening.  The following day the pt went to his neighbors house to get his glasses but was acting off and confused, along with abrasions to bilateral elbows. Pt told them that he also woke up with blood to pillow with no recollection of a fall.  Pt Subsequently went to  his wife from the airport whom also noted pt to be confused. He was subsequently brought to hospital for evaluation. At [place], CT Head evident for acute bilateral subdural hemorrhages and subarachnoid hemorrhage. Pt was also noted to have seizure at [place].     PAST MEDICAL & SURGICAL HISTORY:  No significant past surgical history    MEDICATIONS  (STANDING):  chlorhexidine 2% Cloths 1 Application(s) Topical daily  folic acid 1 milliGRAM(s) Oral daily  levETIRAcetam  IVPB 500 milliGRAM(s) IV Intermittent every 12 hours  sodium chloride 2% . 1000 milliLiter(s) (30 mL/Hr) IV Continuous <Continuous>  tamsulosin 0.4 milliGRAM(s) Oral at bedtime  thiamine IVPB 500 milliGRAM(s) IV Intermittent daily    MEDICATIONS  (PRN):  acetaminophen   Tablet .. 650 milliGRAM(s) Oral every 6 hours PRN Mild Pain (1 - 3)  HYDROmorphone  Injectable 0.5 milliGRAM(s) IV Push every 4 hours PRN Moderate Pain (4 - 6)    Allergies    Allergy Status Unknown    Intolerances        FAMILY HISTORY:  n/a        SOCIAL HISTORY:    Tobacco:  no  Alcohol:  yes  Drugs:  no        REVIEW OF SYSTEMS:    Relevant systems are negative except as noted in the chart, HPI, and PMH      VITAL SIGNS:  Vital Signs Last 24 Hrs  T(C): 37.1 (28 May 2019 08:00), Max: 37.1 (28 May 2019 08:00)  T(F): 98.7 (28 May 2019 08:00), Max: 98.7 (28 May 2019 08:00)  HR: 73 (28 May 2019 10:00) (71 - 85)  BP: 137/64 (28 May 2019 10:00) (120/60 - 169/72)  BP(mean): 92 (28 May 2019 10:00) (83 - 105)  RR: 22 (28 May 2019 10:00) (14 - 28)  SpO2: 98% (28 May 2019 10:00) (96% - 98%)    PHYSICAL EXAMINATION:    General: Well-developed, well nourished, in no acute distress.  Cardiac:  Regular rate and rhythm. No carotid bruits appreciated.  Eyes: Fundoscopic examination was deferred.  Neurologic:  - Mental Status:  Alert, awake, oriented to person, knows he was in [facility]. .Date- 4/28/1987  ; Speech is fluent. Language is normal. Follows commands well.  Insight and knowledge appear a bit off  Cranial Nerves II-XII:    II:  Visual acuity is normal for age ; Visual fields are full to confrontation; Pupils are equal, round, and reactive to light.  III, IV, VI:  Extraocular movements are intact without nystagmus.  V:  Facial sensation is intact in the V1-V3 distribution bilaterally.  VII:  Face is symmetric with normal eye closure and smile  VIII:  Hearing is grossly intact  IX, X, XII:  speech is clear  XI:  Head turning and shoulder shrug are intact.  - Motor:  Strength is 5/5 x 4.   There is no pronator drift. .  - Reflexes:  2+ and symmetric at the knees.  Plantar responses flexor.  - Sensory:  Symmetric to light touch  - Coordination:  Finger-nose-finger is normal. Rapid alternating hand and foot  movements are intact. Dexterity appears normal      LABS:                          11.3   7.2   )-----------( 107      ( 28 May 2019 02:24 )             33.5     28 May 2019 10:48    139    |  106    |  15.0   ----------------------------<  110    3.9     |  22.0   |  0.62     Ca    8.6        28 May 2019 10:48    TPro  6.0    /  Alb  3.7    /  TBili  1.9    /  DBili  0.3    /  AST  57     /  ALT  28     /  AlkPhos  62     28 May 2019 10:48    LIVER FUNCTIONS - ( 28 May 2019 10:48 )  Alb: 3.7 g/dL / Pro: 6.0 g/dL / ALK PHOS: 62 U/L / ALT: 28 U/L / AST: 57 U/L / GGT: x           PT/INR - ( 28 May 2019 02:24 )   PT: 12.6 sec;   INR: 1.09 ratio         PTT - ( 28 May 2019 02:24 )  PTT:24.8 sec      RADIOLOGY & ADDITIONAL STUDIES:    < from: CT Head No Cont (05.28.19 @ 03:01) >  TECHNIQUE: Noncontrast CT of the head. Multiplanar reformations are   submitted.    FINDINGS: Acute thin subdural hemorrhage along the posterior falx   extending along the bilateral tentorial leaflets. Acute thin right   parietal, bilateral frontal, and left temporal convexity subdural   hemorrhages. 3 small subcentimeter hemorrhage superolateral moderate   edema image 20, series 2. Trace subarachnoid hemorrhage in the right   temporal lobe. Trace subarachnoid hemorrhage in the left occipital lobe.   Trace subarachnoid hemorrhage in the interpedicular fossa.    There is periventricular and subcortical white matter hypodensity without   mass effect, nonspecific, likely representing minimal chronic   microvascular ischemic changes. There is no compelling evidence for an   acute transcortical infarction. There is no other evidence of mass, mass   effect, midline shift or additional extra-axial fluid collection. The   lateral ventricles and cortical sulci are age-appropriate in size and   configuration. The orbits, mastoid air cells and visualized paranasal   sinuses are unremarkable. The calvarium is intact.    IMPRESSION: Left occipital lobe hemorrhagic contusions. Bilateral   subdural hemorrhages without significant mass effect. Bilateral   subarachnoid hemorrhage.      < end of copied text >      IMPRESSION:    TBI with SAH, SDH and cerebral contusions    PLAN:  1. Trauma/ICU and neurosurgical managment  2. Consider EEG and MRI for further clarification of extent of injury  3. Continue Keppra  4.  5.

## 2019-05-28 NOTE — ED PROVIDER NOTE - CARE PLAN
Principal Discharge DX:	Subdural hematoma Principal Discharge DX:	Subdural hematoma  Secondary Diagnosis:	Traumatic subarachnoid hemorrhage  Secondary Diagnosis:	Traumatic intraventricular hemorrhage

## 2019-05-28 NOTE — CONSULT NOTE ADULT - CONSULT REASON
b/l SDHs and tSAH   s/p fall and head injury b/l SDHs and tSAH, IVH as seen in [facility], ? Sz - received 1g Keppra and 2mg Ativan en route to [facility]   s/p fall and head injury, pt brought in to [facility] for AMS

## 2019-05-28 NOTE — H&P ADULT - NSHPLABSRESULTS_GEN_ALL_CORE
74yo male presents with subdural and subarachnoid hemorrhage    Plan:  - Admit to SICU  - Repeat CT head  - Neurosurgery consult  - Laceration repair to right forearm

## 2019-05-28 NOTE — CONSULT NOTE ADULT - ASSESSMENT
b/l SDHs and tSAH, + seizure/ Ativan prior arrival at [facility]   s/p fall and head injury     case d/w Dr Marx and images reviewed   no immediate NSx intervention indicated at this time  neuro-checks q1 HR and ICU admit  repeat CTB w/o en route to ICU, last CT at 10 PM at [place]   recommed Keppra 500mg q12Hrs and defer further seizure management as per neurology  HOB > 30 degrees  hold all AC/AP and chemical DVT ppx at this time   SBP < 150mmHg  CIWA recommended b/l SDHs and tSAH, IVH   + seizure: Keppra & Ativan during transport to [facility]   s/p fall and head injury on 5/26     case d/w Dr Marx and images reviewed   no immediate NSx intervention indicated at this time  neuro-checks q1 HR and ICU admit  repeat CTB w/o en route to ICU, last CT at 10 PM at [place]   recommed Keppra 500mg q12Hrs and defer further seizure management as per neurology  HOB > 30 degrees  hold all AC/AP and chemical DVT ppx at this time   SBP < 150mmHg  CIWA recommended   recommend 3% hypertonic saline for IPH/ contusions and Na+ goal of 145/155    case and plan d/w ACS team and Wife, Sonia [phone]

## 2019-05-28 NOTE — CONSULT NOTE ADULT - SUBJECTIVE AND OBJECTIVE BOX
full consult to follow    + ETOH, no Smoking, no drugs as per wife at bedside Patient is a 73y old  Male who presents with a chief complaint of Fall/Subdural Hemorrhage (28 May 2019 02:23)      HPI:  72yo presents to [facility] after fall. Pt was transferred from [facility] after initial fall 2 days ago. He was at party Sunday night where he admitted to do drinking tequila. He had unwitnessed fall later in evening.  The following day the pt went to his neighbors house to get his glasses but was acting off and confused, along with abrasions to bilateral elbows. Pt told them that he also woke up with blood to pillow with no recollection of a fall.  Pt Subsequently went w his friend to  his wife from the airport whom also noted pt to be confused. He was subsequently brought to hospital for evaluation. At [place], CT Head evident for acute bilateral subdural hemorrhages and subarachnoid hemorrhage. Pt was also noted to have seizure at [place].     Primary Survey:  A-airway protected  B-Breath sounds heard bilaterally  C-central and peripheral pulses 2+, bilaterally  D_GCS 8  E-No stepoffs of bony deformities (28 May 2019 02:23)    ? LOC   +  trauma   unable to assess for Headache/ pt postictal/ s/p ativan   - Nausea / - Vomiting  Right hand dominant   unable to assess for new/ worsening weakness  unable to assess for new/ worsening paresthesias/ numbness   unable to assess for new/ worsening visual changes  unable to assess for C- Spine pain, - collar/ cleared by trauma, no collar noted at the time of examination   unable to assess for T/LS  Spine pain, - brace  unable to assess for Bowel/ Bladder dysfunction   [] sepsis   [] hypovolemic shock; [] cardiogenic shock; [] hemorrhagic shock; [] neurogenic shock  [] acute respiratory failure  [] cerebral edema; [] brain compression/ herniation  [] functional quadriplegia   [] acute blood loss anemia     [place] Failure: [] Acute; [] acute on chronic; [] chronic   Heart Failure: [] Diastolic (HFpEF); [] Systolic (HFrEF); [] Combined (HFpEF and HFrEF); [] RHF; [] Pulmonary HTN; [] Other     []ROSITA; [] ATN; [] AIN; [] Other   [] CKD1; [] CKD2; [] CKD3; [] CKD4, [] CKD5; [] ESRD    Encephalopathy: [] Metabolic; [] Hepatic; [] Toxic; [] Neurological; [] Other     Abnormal Nutritional Status: [] malnutrition (see nutrition note); [] underweight: BMI <19; [] morbid obesity: BMI >40; [] Cachexia  last seen normal as per family        GCS: 13  Eye response (E)4  Verbal response (V)4  Motor response (M)5      traumatic SAH - Steele & Hernandez: III        PAST MEDICAL & SURGICAL HISTORY:  BPH     SOCIAL HISTORY: + EtOH/ beer ~daily, - tobacco, - drugs    FAMILY HISTORY:   non-pertinent at this time        ROS: unable to obtain at this time        MEDICATIONS  (STANDING):  diphtheria/tetanus/pertussis (acellular) Vaccine (ADAcel) 0.5 milliLiter(s) IntraMuscular once  levETIRAcetam 500 milliGRAM(s) Oral two times a day    MEDICATIONS  (PRN):    Allergies    Allergy Status Unknown    Intolerances      Vital Signs Last 24 Hrs  T(C): 36.8 (28 May 2019 03:15), Max: 36.8 (28 May 2019 03:15)  T(F): 98.3 (28 May 2019 03:15), Max: 98.3 (28 May 2019 03:15)  HR: 80 (28 May 2019 03:15) (80 - 80)  BP: 151/73 (28 May 2019 03:15) (151/73 - 151/73)  BP(mean): 101 (28 May 2019 03:15) (101 - 101)  RR: 14 (28 May 2019 03:15) (14 - 14)  SpO2: 96% (28 May 2019 03:15) (96% - 96%)        PHYSICAL EXAM:  GENERAL: NAD, well-groomed, well-developed, Awake in bed and tracking, post-ictal & s/p ativan/ slow to respond  HEAD:  traumatic, Normocephalic, no palpable step-off appreciated on palpation, + scalp hematoma to posterior parietal region w some crusted blood - likely from cyst resection as per wife   EYES: b/l tracking, PERRL/ 2 mm b/l, conjunctiva and sclera clear, + cataract b/l   NECK: no collar noted, unable to assess for tenderness 2* exam/ postictal/sedated   TS/LS: unable to assess for tenderness 2* exam/ postictal/sedated   NERVOUS SYSTEM:  awake in bed and tracking, not FC in English or Polish, able to state his name only. Motor Strength able to hold b/l UEs antigravity, withdraws b/l LEs to noxious stimuli, unable to assess for pronators b/l, no ankle clonus appreciated b/l, unable to assess for cerebellar signs. CN II-XII seem grossly intact b/l and symmetric; no robins's b/l appreciated.   EXTREMITIES:  2+ Peripheral Pulses, No clubbing, cyanosis, or edema,   ABDOMEN: Soft, Nontender, Nondistended;   SKIN: No rashes or lesions, + right posterior parietal scalp hematoma and scab noted - likely related to trauma and cyst resection respectively                           11.3   7.2   )-----------( 107      ( 28 May 2019 02:24 )             33.5     05-28    138  |  103  |  20.0  ----------------------------<  136<H>  3.8   |  23.0  |  0.82    Ca    8.7      28 May 2019 02:24    TPro  6.2<L>  /  Alb  3.9  /  TBili  1.7  /  DBili  x   /  AST  64<H>  /  ALT  30  /  AlkPhos  61  05-28    PT/INR - ( 28 May 2019 02:24 )   PT: 12.6 sec;   INR: 1.09 ratio         PTT - ( 28 May 2019 02:24 )  PTT:24.8 sec    LIVER FUNCTIONS - ( 28 May 2019 02:24 )  Alb: 3.9 g/dL / Pro: 6.2 g/dL / ALK PHOS: 61 U/L / ALT: 30 U/L / AST: 64 U/L / GGT: x               RADIOLOGY & ADDITIONAL STUDIES:        Time Spent with patient/ education on the floor & arranging care: 30 mins

## 2019-05-28 NOTE — H&P ADULT - HISTORY OF PRESENT ILLNESS
74yo presents to [facility] after fall. Pt was transferred from [facility] after initial fall 2 days ago. He was at party Sunday night where he admitted to do drinking tequila. He had unwitnessed fall later in evening. The following morning, a friend found him where he was slurring words and had difficulty standing. He was subsequently brought to hospital for evaluation. At [place], CT Head evident for acute bilateral subdural hemorrhages and subarachnoid hemorrhage.     Primary Survey:  A-airway protected  B-Breath sounds heard bilaterally  C-central and peripheral pulses 2+, bilaterally  D_GCS 8  E-No stepoffs of bony deformities 74yo presents to [facility] after fall. Pt was transferred from [facility] after initial fall 2 days ago. He was at party Sunday night where he admitted to do drinking tequila. He had unwitnessed fall later in evening.  The following day the pt went to his neighbors house to get his glasses but was acting off and confused, along with abrasions to bilateral elbows. Pt told them that he also woke up with blood to pillow with no recollection of a fall.  Pt Subsequently went to  his wife from the airport whom also noted pt to be confused. He was subsequently brought to hospital for evaluation. At [place], CT Head evident for acute bilateral subdural hemorrhages and subarachnoid hemorrhage. Pt was also noted to have seizure at [place].     Primary Survey:  A-airway protected  B-Breath sounds heard bilaterally  C-central and peripheral pulses 2+, bilaterally  D_GCS 8  E-No stepoffs of bony deformities 74yo presents to [facility] after fall. Pt was transferred from [facility] after initial fall 2 days ago. He was at party Sunday night where he admitted to do drinking tequila. He had unwitnessed fall later in evening.  The following day the pt went to his neighbors house to get his glasses but was acting off and confused, along with abrasions to bilateral elbows. Pt told them that he also woke up with blood to pillow with no recollection of a fall.  Pt Subsequently went to  his wife from the airport whom also noted pt to be confused. He was subsequently brought to hospital for evaluation. At [place], CT Head evident for acute bilateral subdural hemorrhages and subarachnoid hemorrhage. Pt was also noted to have seizure at [place].     Primary Survey:  A-airway protected  B-Breath sounds heard bilaterally  C-central and peripheral pulses 2+, bilaterally  D_GCS 9  E-No stepoffs of bony deformities

## 2019-05-28 NOTE — ED ADULT TRIAGE NOTE - CHIEF COMPLAINT QUOTE
patient chiquis from [facility] for a trauma transfer. As per report from [facility] patient was at a party saturday when he fell, brought to [facility] yesterday for AMS patient with a subdural bleed and altered mental status. patient alert and aphasic as per ems prior to transfer patient became combative and agitated and received 2mg of ativan for transport. patient had seizure at [place]. patient not answering questions. Dr Plata at bedside code trauma B called.

## 2019-05-28 NOTE — PROGRESS NOTE ADULT - ASSESSMENT
73y Male with intraparenchymal hemorrhage  Neuro: interval CT this afternoon, continue Q1 neuro check for at least 24 H  Pulm: oob PT ordered  Card: no new issues, home meds as appropriate  GI: full enteral nutrition  Renal: continue to trend sodium and osmolality while on 2% saline  ID: no new issues  ppx: will discuss with neurosurgery timing of dvt chem oppx  dispo: continue to monitor in ICU for now

## 2019-05-28 NOTE — ED ADULT NURSE REASSESSMENT NOTE - NS ED NURSE REASSESS COMMENT FT1
0040 Pt became unable to talk at all after he used the urinal. Small amount of saliva noted , Neck collar taken off by MD. Ativan 1 mg IVP x 2 times given.

## 2019-05-28 NOTE — ED PROVIDER NOTE - CONSTITUTIONAL, MLM
Health Maintenance Summary     Topic Due On Due Status Completed On    Immunization - TDAP Pregnancy  Hidden      Oct 6, 1997 Overdue     IMMUNIZATION - DTaP/Tdap/Td Oct 22, 2020 Not Due Oct 22, 2010    Immunization-Influenza Sep 1, 2018 Not Due Nov 30, 2017          Patient is up to date, no discussion needed .             normal... Awake, alert, responsive to verbal stimuli

## 2019-05-28 NOTE — CHART NOTE - NSCHARTNOTEFT_GEN_A_CORE
Code Trauma B; transfer from [place]. ISTAT ran, no additional interventions needed from Respiratory.

## 2019-05-28 NOTE — PROGRESS NOTE ADULT - SUBJECTIVE AND OBJECTIVE BOX
HISTORY  73y Male with intraparenchymal hemorrhage    24 HOUR EVENTS: transferred from [place] overnight. Neurologically stable, slow to answer confused speech/aphagia at times but motor intact.    SUBJECTIVE/ROS:  [x ] A ten-point review of systems was otherwise negative except as noted.  [ ] Due to altered mental status/intubation, subjective information were not able to be obtained from the patient. History was obtained, to the extent possible, from review of the chart and collateral sources of information.      NEURO  RASS:  0   GCS: 4,4,6     CAM ICU: neg  Exam: non focal motor exam + expressive aphagia  Meds: acetaminophen   Tablet .. 650 milliGRAM(s) Oral every 6 hours PRN Mild Pain (1 - 3)  HYDROmorphone  Injectable 0.5 milliGRAM(s) IV Push every 4 hours PRN Moderate Pain (4 - 6)  levETIRAcetam  IVPB 500 milliGRAM(s) IV Intermittent every 12 hours    [x] Adequacy of sedation and pain control has been assessed and adjusted      RESPIRATORY  RR: 22 (05-28-19 @ 11:00) (14 - 28)  SpO2: 97% (05-28-19 @ 11:00) (96% - 99%)  Wt(kg): --  Exam: unlabored, clear to auscultation bilaterally  Mechanical Ventilation:   ABG - ( 28 May 2019 10:31 )  pH: x     /  pCO2: x     /  pO2: x     / HCO3: x     / Base Excess: x     /  SaO2: x       Lactate: 1.2              [ ] Extubation Readiness Assessed  Meds:       CARDIOVASCULAR  HR: 79 (05-28-19 @ 11:00) (71 - 95)  BP: 153/70 (05-28-19 @ 11:00) (120/60 - 177/74)  BP(mean): 101 (05-28-19 @ 11:00) (83 - 105)  ABP: --  ABP(mean): --  Wt(kg): --  CVP(cm H2O): --  VBG - ( 28 May 2019 02:25 )  pH: x     /  pCO2: x     /  pO2: x     / HCO3: x     / Base Excess: x     /  SaO2: x      Lactate: 3.4              Lactate, Blood: 1.2 mmol/L (05-28 @ 10:31)    Exam:s1s2  Cardiac Rhythm: sinus  Perfusion     [x ]Adequate   [ ]Inadequate  Mentation   [ ]Normal       [x ]Reduced  Extremities  [x ]Warm         [ ]Cool  Volume Status [ ]Hypervolemic [x ]Euvolemic [ ]Hypovolemic  Meds: tamsulosin 0.4 milliGRAM(s) Oral at bedtime        GI/NUTRITION  Exam: soft NT ND  Diet: Reg  Meds:     GENITOURINARY  I&O's Detail    05-27 @ 07:01  -  05-28 @ 07:00  --------------------------------------------------------  IN:    Sodium Chloride 0.9% IV Bolus: 1000 mL    sodium chloride 2% .: 60 mL    Solution: 200 mL  Total IN: 1260 mL    OUT:    Voided: 150 mL  Total OUT: 150 mL    Total NET: 1110 mL      05-28 @ 07:01  -  05-28 @ 13:14  --------------------------------------------------------  IN:    sodium chloride 2% .: 150 mL    Solution: 100 mL    Solution: 100 mL    Solution: 100 mL  Total IN: 450 mL    OUT:    Voided: 550 mL  Total OUT: 550 mL    Total NET: -100 mL        Weight (kg): 68.2 (05-28 @ 03:15), 68 (05-27 @ 22:23)  05-28    139  |  106  |  15.0  ----------------------------<  110  3.9   |  22.0  |  0.62    Ca    8.6      28 May 2019 10:48    TPro  6.0<L>  /  Alb  3.7  /  TBili  1.9  /  DBili  0.3  /  AST  57<H>  /  ALT  28  /  AlkPhos  62  05-28    [ ] Stanley catheter, indication: N/A  Meds: folic acid 1 milliGRAM(s) Oral daily  sodium chloride 2% . 1000 milliLiter(s) IV Continuous <Continuous>  thiamine IVPB 500 milliGRAM(s) IV Intermittent daily        HEMATOLOGIC  Meds:   [x] VTE Prophylaxis                        11.3   7.2   )-----------( 107      ( 28 May 2019 02:24 )             33.5     PT/INR - ( 28 May 2019 02:24 )   PT: 12.6 sec;   INR: 1.09 ratio         PTT - ( 28 May 2019 02:24 )  PTT:24.8 sec  Transfusion     [ ] PRBC   [ ] Platelets   [ ] FFP   [ ] Cryoprecipitate      INFECTIOUS DISEASES  T(C): 37.1 (05-28-19 @ 08:00), Max: 37.1 (05-28-19 @ 08:00)  Wt(kg): --  WBC Count: 7.2 K/uL (05-28 @ 02:24)  WBC Count: 8.33 K/uL (05-27 @ 22:40)    Recent Cultures:    Meds:       ENDOCRINE  Capillary Blood Glucose  115 (28 May 2019 04:33)    Meds:       ACCESS DEVICES:  [ ] Peripheral IV  [ ] Central Venous Line	[ ] R	[ ] L	[ ] IJ	[ ] Fem	[ ] SC	Placed:   [ ] Arterial Line		[ ] R	[ ] L	[ ] Fem	[ ] Rad	[ ] Ax	Placed:   [ ] PICC:					[ ] Mediport  [ ] Urinary Catheter, Date Placed:   [ ] Necessity of urinary, arterial, and venous catheters discussed    OTHER MEDICATIONS:  chlorhexidine 2% Cloths 1 Application(s) Topical daily      CODE STATUS:     IMAGING:    ____ minutes of critical care time spent providing medical care for patient's acute illness/conditions that impairs at least one vital organ system and/or poses a high risk of imminent or life threatening deterioration in the patient's condition. It includes time spent evaluating and treating the patient's acute illness as well as time spent reviewing labs, radiology, discussing goals of care with patient and/or patient's family, and discussing the case with a multidisciplinary team in an effort to prevent further life threatening deterioration or end organ damage. This time is independent of any procedures performed.

## 2019-05-29 LAB
ANION GAP SERPL CALC-SCNC: 10 MMOL/L — SIGNIFICANT CHANGE UP (ref 5–17)
BASOPHILS # BLD AUTO: 0 K/UL — SIGNIFICANT CHANGE UP (ref 0–0.2)
BASOPHILS NFR BLD AUTO: 0.4 % — SIGNIFICANT CHANGE UP (ref 0–2)
BUN SERPL-MCNC: 12 MG/DL — SIGNIFICANT CHANGE UP (ref 8–20)
CALCIUM SERPL-MCNC: 8.5 MG/DL — LOW (ref 8.6–10.2)
CHLORIDE SERPL-SCNC: 108 MMOL/L — HIGH (ref 98–107)
CO2 SERPL-SCNC: 23 MMOL/L — SIGNIFICANT CHANGE UP (ref 22–29)
CREAT SERPL-MCNC: 0.77 MG/DL — SIGNIFICANT CHANGE UP (ref 0.5–1.3)
EOSINOPHIL # BLD AUTO: 0 K/UL — SIGNIFICANT CHANGE UP (ref 0–0.5)
EOSINOPHIL NFR BLD AUTO: 0.6 % — SIGNIFICANT CHANGE UP (ref 0–6)
GLUCOSE SERPL-MCNC: 110 MG/DL — SIGNIFICANT CHANGE UP (ref 70–115)
HCT VFR BLD CALC: 33.1 % — LOW (ref 42–52)
HCV AB S/CO SERPL IA: 0.08 S/CO — SIGNIFICANT CHANGE UP (ref 0–0.99)
HCV AB SERPL-IMP: SIGNIFICANT CHANGE UP
HGB BLD-MCNC: 11.7 G/DL — LOW (ref 14–18)
LYMPHOCYTES # BLD AUTO: 1.2 K/UL — SIGNIFICANT CHANGE UP (ref 1–4.8)
LYMPHOCYTES # BLD AUTO: 16.9 % — LOW (ref 20–55)
MAGNESIUM SERPL-MCNC: 2 MG/DL — SIGNIFICANT CHANGE UP (ref 1.6–2.6)
MCHC RBC-ENTMCNC: 29.6 PG — SIGNIFICANT CHANGE UP (ref 27–31)
MCHC RBC-ENTMCNC: 35.3 G/DL — SIGNIFICANT CHANGE UP (ref 32–36)
MCV RBC AUTO: 83.8 FL — SIGNIFICANT CHANGE UP (ref 80–94)
MONOCYTES # BLD AUTO: 0.8 K/UL — SIGNIFICANT CHANGE UP (ref 0–0.8)
MONOCYTES NFR BLD AUTO: 10.9 % — HIGH (ref 3–10)
NEUTROPHILS # BLD AUTO: 4.9 K/UL — SIGNIFICANT CHANGE UP (ref 1.8–8)
NEUTROPHILS NFR BLD AUTO: 70.6 % — SIGNIFICANT CHANGE UP (ref 37–73)
OSMOLALITY SERPL: 295 MOSM/KG — SIGNIFICANT CHANGE UP (ref 280–300)
PHOSPHATE SERPL-MCNC: 1.8 MG/DL — LOW (ref 2.4–4.7)
PLATELET # BLD AUTO: 106 K/UL — LOW (ref 150–400)
POTASSIUM SERPL-MCNC: 3.6 MMOL/L — SIGNIFICANT CHANGE UP (ref 3.5–5.3)
POTASSIUM SERPL-SCNC: 3.6 MMOL/L — SIGNIFICANT CHANGE UP (ref 3.5–5.3)
RBC # BLD: 3.95 M/UL — LOW (ref 4.6–6.2)
RBC # FLD: 14.4 % — SIGNIFICANT CHANGE UP (ref 11–15.6)
SODIUM SERPL-SCNC: 141 MMOL/L — SIGNIFICANT CHANGE UP (ref 135–145)
WBC # BLD: 7 K/UL — SIGNIFICANT CHANGE UP (ref 4.8–10.8)
WBC # FLD AUTO: 7 K/UL — SIGNIFICANT CHANGE UP (ref 4.8–10.8)

## 2019-05-29 PROCEDURE — 99231 SBSQ HOSP IP/OBS SF/LOW 25: CPT

## 2019-05-29 PROCEDURE — 99232 SBSQ HOSP IP/OBS MODERATE 35: CPT

## 2019-05-29 PROCEDURE — 95819 EEG AWAKE AND ASLEEP: CPT | Mod: 26

## 2019-05-29 RX ORDER — TAMSULOSIN HYDROCHLORIDE 0.4 MG/1
0.8 CAPSULE ORAL AT BEDTIME
Refills: 0 | Status: DISCONTINUED | OUTPATIENT
Start: 2019-05-29 | End: 2019-05-30

## 2019-05-29 RX ORDER — ENOXAPARIN SODIUM 100 MG/ML
40 INJECTION SUBCUTANEOUS DAILY
Refills: 0 | Status: DISCONTINUED | OUTPATIENT
Start: 2019-05-29 | End: 2019-05-30

## 2019-05-29 RX ORDER — POTASSIUM PHOSPHATE, MONOBASIC POTASSIUM PHOSPHATE, DIBASIC 236; 224 MG/ML; MG/ML
15 INJECTION, SOLUTION INTRAVENOUS ONCE
Refills: 0 | Status: COMPLETED | OUTPATIENT
Start: 2019-05-29 | End: 2019-05-29

## 2019-05-29 RX ADMIN — ENOXAPARIN SODIUM 40 MILLIGRAM(S): 100 INJECTION SUBCUTANEOUS at 22:03

## 2019-05-29 RX ADMIN — Medication 1 MILLIGRAM(S): at 11:06

## 2019-05-29 RX ADMIN — TAMSULOSIN HYDROCHLORIDE 0.8 MILLIGRAM(S): 0.4 CAPSULE ORAL at 22:03

## 2019-05-29 RX ADMIN — POTASSIUM PHOSPHATE, MONOBASIC POTASSIUM PHOSPHATE, DIBASIC 62.5 MILLIMOLE(S): 236; 224 INJECTION, SOLUTION INTRAVENOUS at 15:25

## 2019-05-29 RX ADMIN — SODIUM CHLORIDE 30 MILLILITER(S): 5 INJECTION, SOLUTION INTRAVENOUS at 22:20

## 2019-05-29 RX ADMIN — CHLORHEXIDINE GLUCONATE 1 APPLICATION(S): 213 SOLUTION TOPICAL at 10:45

## 2019-05-29 RX ADMIN — LEVETIRACETAM 420 MILLIGRAM(S): 250 TABLET, FILM COATED ORAL at 22:02

## 2019-05-29 RX ADMIN — Medication 105 MILLIGRAM(S): at 11:21

## 2019-05-29 RX ADMIN — LEVETIRACETAM 420 MILLIGRAM(S): 250 TABLET, FILM COATED ORAL at 07:46

## 2019-05-29 NOTE — PROGRESS NOTE ADULT - SUBJECTIVE AND OBJECTIVE BOX
HISTORY  73y Male with intraparenchymal contusion    24 HOUR EVENTS: CT head stable, expressive aphagia improving    SUBJECTIVE/ROS:  [x ] A ten-point review of systems was otherwise negative except as noted.  [ ] Due to altered mental status/intubation, subjective information were not able to be obtained from the patient. History was obtained, to the extent possible, from review of the chart and collateral sources of information.      NEURO  RASS:  0   GCS: 15    CAM ICU:neg  Exam: strength 5/5 through out  Meds: acetaminophen   Tablet .. 650 milliGRAM(s) Oral every 6 hours PRN Mild Pain (1 - 3)  HYDROmorphone  Injectable 0.5 milliGRAM(s) IV Push every 4 hours PRN Moderate Pain (4 - 6)  levETIRAcetam  IVPB 500 milliGRAM(s) IV Intermittent every 12 hours    [x] Adequacy of sedation and pain control has been assessed and adjusted      RESPIRATORY  RR: 21 (05-29-19 @ 13:00) (17 - 27)  SpO2: 98% (05-29-19 @ 13:00) (96% - 99%)  Wt(kg): --  Exam: unlabored, clear to auscultation bilaterally  Mechanical Ventilation:   ABG - ( 28 May 2019 10:31 )  pH: x     /  pCO2: x     /  pO2: x     / HCO3: x     / Base Excess: x     /  SaO2: x       Lactate: 1.2              [ ] Extubation Readiness Assessed  Meds:       CARDIOVASCULAR  HR: 75 (05-29-19 @ 13:00) (58 - 91)  BP: 156/76 (05-29-19 @ 13:00) (104/55 - 177/78)  BP(mean): 109 (05-29-19 @ 13:00) (73 - 116)  ABP: --  ABP(mean): --  Wt(kg): --  CVP(cm H2O): --  VBG - ( 28 May 2019 02:25 )  pH: x     /  pCO2: x     /  pO2: x     / HCO3: x     / Base Excess: x     /  SaO2: x      Lactate: 3.4                Exam: s1s2  Cardiac Rhythm: sinus  Perfusion     [ x]Adequate   [ ]Inadequate  Mentation   [x ]Normal       [ ]Reduced  Extremities  [x ]Warm         [ ]Cool  Volume Status [ ]Hypervolemic [x ]Euvolemic [ ]Hypovolemic  Meds: tamsulosin 0.8 milliGRAM(s) Oral at bedtime        GI/NUTRITION  Exam: soft nt nd  Diet: regular  Meds:     GENITOURINARY  I&O's Detail    05-28 @ 07:01  -  05-29 @ 07:00  --------------------------------------------------------  IN:    Oral Fluid: 300 mL    sodium chloride 2% .: 720 mL    Solution: 300 mL    Solution: 100 mL    Solution: 100 mL  Total IN: 1520 mL    OUT:    Voided: 2155 mL  Total OUT: 2155 mL    Total NET: -635 mL      05-29 @ 07:01  -  05-29 @ 13:35  --------------------------------------------------------  IN:    Oral Fluid: 480 mL  Total IN: 480 mL    OUT:    Voided: 140 mL  Total OUT: 140 mL    Total NET: 340 mL          05-29    141  |  108<H>  |  12.0  ----------------------------<  110  3.6   |  23.0  |  0.77    Ca    8.5<L>      29 May 2019 04:44  Phos  1.8     05-29  Mg     2.0     05-29    TPro  6.0<L>  /  Alb  3.7  /  TBili  1.9  /  DBili  0.3  /  AST  57<H>  /  ALT  28  /  AlkPhos  62  05-28    [ ] Stanley catheter, indication: N/A  Meds: folic acid 1 milliGRAM(s) Oral daily  sodium chloride 2% . 1000 milliLiter(s) IV Continuous <Continuous>  thiamine IVPB 500 milliGRAM(s) IV Intermittent daily        HEMATOLOGIC  Meds: enoxaparin Injectable 40 milliGRAM(s) SubCutaneous daily    [x] VTE Prophylaxis                        11.7   7.0   )-----------( 106      ( 29 May 2019 04:44 )             33.1     PT/INR - ( 28 May 2019 02:24 )   PT: 12.6 sec;   INR: 1.09 ratio         PTT - ( 28 May 2019 02:24 )  PTT:24.8 sec  Transfusion     [ ] PRBC   [ ] Platelets   [ ] FFP   [ ] Cryoprecipitate      INFECTIOUS DISEASES  T(C): 37.1 (05-29-19 @ 10:00), Max: 37.2 (05-28-19 @ 16:00)  Wt(kg): --  WBC Count: 7.0 K/uL (05-29 @ 04:44)    Recent Cultures:    Meds:       ENDOCRINE  Capillary Blood Glucose    Meds:       ACCESS DEVICES:  [x ] Peripheral IV  [ ] Central Venous Line	[ ] R	[ ] L	[ ] IJ	[ ] Fem	[ ] SC	Placed:   [ ] Arterial Line		[ ] R	[ ] L	[ ] Fem	[ ] Rad	[ ] Ax	Placed:   [ ] PICC:					[ ] Mediport  [ ] Urinary Catheter, Date Placed:   [ ] Necessity of urinary, arterial, and venous catheters discussed    OTHER MEDICATIONS:  chlorhexidine 2% Cloths 1 Application(s) Topical daily      CODE STATUS:     IMAGING:

## 2019-05-29 NOTE — CHART NOTE - NSCHARTNOTEFT_GEN_A_CORE
SICU DOWNGRADE NOTE    Patient seen and examined at bedside. Patient has no complaints, tolerating a regular diet, having bowel function, voiding. Denies fever, chills, nausea, vomitting, chest pain, SOB, dizziness, abd pain or any other concerning symptoms    Vital Signs Last 24 Hrs  T(C): 36.6 (29 May 2019 20:00), Max: 37.1 (29 May 2019 10:00)  T(F): 97.9 (29 May 2019 20:00), Max: 98.8 (29 May 2019 10:00)  HR: 69 (29 May 2019 20:00) (58 - 91)  BP: 165/83 (29 May 2019 20:00) (122/56 - 177/78)  BP(mean): 116 (29 May 2019 16:00) (80 - 116)  RR: 18 (29 May 2019 20:00) (17 - 27)  SpO2: 96% (29 May 2019 20:00) (96% - 99%)    I&O's Detail    28 May 2019 07:01  -  29 May 2019 07:00  --------------------------------------------------------  IN:    Oral Fluid: 300 mL    sodium chloride 2% .: 720 mL    Solution: 300 mL    Solution: 100 mL    Solution: 100 mL  Total IN: 1520 mL    OUT:    Voided: 2155 mL  Total OUT: 2155 mL    Total NET: -635 mL      29 May 2019 07:01  -  29 May 2019 21:51  --------------------------------------------------------  IN:    Oral Fluid: 720 mL    Solution: 250 mL  Total IN: 970 mL    OUT:    Voided: 520 mL  Total OUT: 520 mL    Total NET: 450 mL            Constitutional: patient resting comfortably in bed, in no acute distress  HEENT: EOMI / PERRL b/l   Neck: No JVD, full ROM without pain  Respiratory: CTAB, respirations are unlabored, no accessory muscle use, no conversational dyspnea  Cardiovascular: regular rate & rhythm  Gastrointestinal: Abdomen soft, non-tender, non-distended, no rebound tenderness / guarding  Neurological: GCS: 15 (4/5/6). A&O x 3; no gross sensory / motor / coordination deficits  Musculoskeletal: No joint pain, swelling or deformity; no limitation of movement    LABS:                        11.7   7.0   )-----------( 106      ( 29 May 2019 04:44 )             33.1         141  |  108<H>  |  12.0  ----------------------------<  110  3.6   |  23.0  |  0.77    Ca    8.5<L>      29 May 2019 04:44  Phos  1.8       Mg     2.0         TPro  6.0<L>  /  Alb  3.7  /  TBili  1.9  /  DBili  0.3  /  AST  57<H>  /  ALT  28  /  AlkPhos  62  -    PT/INR - ( 28 May 2019 02:24 )   PT: 12.6 sec;   INR: 1.09 ratio         PTT - ( 28 May 2019 02:24 )  PTT:24.8 sec  Urinalysis Basic - ( 28 May 2019 07:14 )    Color: Yellow / Appearance: Clear / S.020 / pH: x  Gluc: x / Ketone: Small  / Bili: Negative / Urobili: Negative mg/dL   Blood: x / Protein: 30 mg/dL / Nitrite: Negative   Leuk Esterase: Negative / RBC: 6-10 /HPF / WBC 0-2   Sq Epi: x / Non Sq Epi: Occasional / Bacteria: x        MEDICATIONS  (STANDING):  chlorhexidine 2% Cloths 1 Application(s) Topical daily  enoxaparin Injectable 40 milliGRAM(s) SubCutaneous daily  folic acid 1 milliGRAM(s) Oral daily  levETIRAcetam  IVPB 500 milliGRAM(s) IV Intermittent every 12 hours  sodium chloride 2% . 1000 milliLiter(s) (30 mL/Hr) IV Continuous <Continuous>  tamsulosin 0.8 milliGRAM(s) Oral at bedtime  thiamine IVPB 500 milliGRAM(s) IV Intermittent daily    MEDICATIONS  (PRN):  acetaminophen   Tablet .. 650 milliGRAM(s) Oral every 6 hours PRN Mild Pain (1 - 3)  HYDROmorphone  Injectable 0.5 milliGRAM(s) IV Push every 4 hours PRN Moderate Pain (4 - 6)            A/P:   72yo male s/p fall while intoxicated, b/l SDH, SAH.   -f/u speech eval  -f/u neurology  -f/u PT/OT/PMR  -Can restart Lovenox

## 2019-05-29 NOTE — PROGRESS NOTE ADULT - SUBJECTIVE AND OBJECTIVE BOX
INTERVAL HISTORY:  stable      VITAL SIGNS:  Vital Signs Last 24 Hrs  T(C): 36.9 (29 May 2019 04:00), Max: 37.2 (28 May 2019 16:00)  T(F): 98.4 (29 May 2019 04:00), Max: 99 (28 May 2019 16:00)  HR: 70 (29 May 2019 10:00) (58 - 91)  BP: 162/72 (29 May 2019 10:00) (104/55 - 176/76)  BP(mean): 104 (29 May 2019 10:00) (73 - 109)  RR: 18 (29 May 2019 10:00) (17 - 27)  SpO2: 98% (29 May 2019 10:00) (96% - 99%)    PHYSICAL EXAMINATION:    Mentation:  nl a and o x 3  Language/Speech: nl  CN: nl  Visual Fields: full  Motor: nl  Sensory:  DTR:  Babinski:      MEDS:  MEDICATIONS  (STANDING):  chlorhexidine 2% Cloths 1 Application(s) Topical daily  enoxaparin Injectable 40 milliGRAM(s) SubCutaneous daily  folic acid 1 milliGRAM(s) Oral daily  levETIRAcetam  IVPB 500 milliGRAM(s) IV Intermittent every 12 hours  sodium chloride 2% . 1000 milliLiter(s) (30 mL/Hr) IV Continuous <Continuous>  tamsulosin 0.8 milliGRAM(s) Oral at bedtime  thiamine IVPB 500 milliGRAM(s) IV Intermittent daily    MEDICATIONS  (PRN):  acetaminophen   Tablet .. 650 milliGRAM(s) Oral every 6 hours PRN Mild Pain (1 - 3)  HYDROmorphone  Injectable 0.5 milliGRAM(s) IV Push every 4 hours PRN Moderate Pain (4 - 6)      LABS:                          11.7   7.0   )-----------( 106      ( 29 May 2019 04:44 )             33.1     05-29    141  |  108<H>  |  12.0  ----------------------------<  110  3.6   |  23.0  |  0.77    Ca    8.5<L>      29 May 2019 04:44  Phos  1.8     05-29  Mg     2.0     05-29    TPro  6.0<L>  /  Alb  3.7  /  TBili  1.9  /  DBili  0.3  /  AST  57<H>  /  ALT  28  /  AlkPhos  62  05-28    LIVER FUNCTIONS - ( 28 May 2019 10:48 )  Alb: 3.7 g/dL / Pro: 6.0 g/dL / ALK PHOS: 62 U/L / ALT: 28 U/L / AST: 57 U/L / GGT: x               RADIOLOGY & ADDITIONAL STUDIES:    MRI- pending  EEG- pending    IMPRESSION & PLAN:    TBI  Stable  Await MRI and EEG

## 2019-05-29 NOTE — PROGRESS NOTE ADULT - SUBJECTIVE AND OBJECTIVE BOX
INTERVAL HPI/OVERNIGHT EVENTS:  Pt admitted on  after a fall. Ct of the brain pos for b/l Subdural hematoma, Traumatic SAH, IVH  Pt sitting in bed this am. He states that he already walked with physical therapy.  Pt states that he is improving and hoping to be discharged soon.   PT following commands x4 extrem. Oriented x3 denies headache at present.     MEDICATIONS  (STANDING):  chlorhexidine 2% Cloths 1 Application(s) Topical daily  enoxaparin Injectable 40 milliGRAM(s) SubCutaneous daily  folic acid 1 milliGRAM(s) Oral daily  levETIRAcetam  IVPB 500 milliGRAM(s) IV Intermittent every 12 hours  sodium chloride 2% . 1000 milliLiter(s) (30 mL/Hr) IV Continuous <Continuous>  tamsulosin 0.8 milliGRAM(s) Oral at bedtime  thiamine IVPB 500 milliGRAM(s) IV Intermittent daily    MEDICATIONS  (PRN):  acetaminophen   Tablet .. 650 milliGRAM(s) Oral every 6 hours PRN Mild Pain (1 - 3)  HYDROmorphone  Injectable 0.5 milliGRAM(s) IV Push every 4 hours PRN Moderate Pain (4 - 6)      Allergies  Allergy Status Unknown  No Known Allergies  Intolerances    Vital Signs Last 24 Hrs  T(C): 37.1 (29 May 2019 10:00), Max: 37.2 (28 May 2019 16:00)  T(F): 98.8 (29 May 2019 10:00), Max: 99 (28 May 2019 16:00)  HR: 73 (29 May 2019 11:00) (58 - 91)  BP: 177/78 (29 May 2019 11:00) (104/55 - 177/78)  BP(mean): 112 (29 May 2019 11:00) (73 - 112)  RR: 20 (29 May 2019 11:00) (17 - 27)  SpO2: 98% (29 May 2019 11:00) (96% - 99%) BMI (kg/m2): 18.3 (-19 @ 03:15)      PHYSICAL EXAM  GCS 15   GENERAL: NAD, well-groomed, well-developed  HEAD:  Atraumatic, Normocephalic  EYES: EOMI, PERRLA, conjunctiva and sclera clear  ENMT: No tonsillar erythema, exudates, or enlargement; Moist mucous membranes, Good dentition, No lesions  NECK: Supple, No JVD, Normal thyroid  NERVOUS SYSTEM:  Alert & Oriented X3, Good concentration; Motor Strength 5/5 B/L upper and lower extremities; DTRs 2+ intact and symmetric  CHEST/LUNG: Clear bs bilaterally; No rales, rhonchi, wheezing, or rubs  HEART: Regular rate and rhythm; No murmurs, rubs, or gallops  ABDOMEN: Soft, Nontender, Nondistended; Bowel sounds present  EXTREMITIES:  No clubbing, cyanosis, or edema     LABS:                          11.7   7.0   )-----------( 106      ( 29 May 2019 04:44 )             33.1     -    141  |  108<H>  |  12.0  ----------------------------<  110  3.6   |  23.0  |  0.77    Ca    8.5<L>      29 May 2019 04:44  Phos  1.8       Mg     2.0         TPro  6.0<L>  /  Alb  3.7  /  TBili  1.9  /  DBili  0.3  /  AST  57<H>  /  ALT  28  /  AlkPhos  62  05-28    PT/INR - ( 28 May 2019 02:24 )   PT: 12.6 sec;   INR: 1.09 ratio         PTT - ( 28 May 2019 02:24 )  PTT:24.8 sec  Urinalysis Basic - ( 28 May 2019 07:14 )    Color: Yellow / Appearance: Clear / S.020 / pH: x  Gluc: x / Ketone: Small  / Bili: Negative / Urobili: Negative mg/dL   Blood: x / Protein: 30 mg/dL / Nitrite: Negative   Leuk Esterase: Negative / RBC: 6-10 /HPF / WBC 0-2   Sq Epi: x / Non Sq Epi: Occasional / Bacteria: x      I&O's Detail    28 May 2019 07:01  -  29 May 2019 07:00  --------------------------------------------------------  IN:    Oral Fluid: 300 mL    sodium chloride 2% .: 720 mL    Solution: 300 mL    Solution: 100 mL    Solution: 100 mL  Total IN: 1520 mL    OUT:    Voided: 2155 mL  Total OUT: 2155 mL    Total NET: -635 mL      29 May 2019 07:01  -  29 May 2019 12:29  --------------------------------------------------------  IN:    Oral Fluid: 240 mL  Total IN: 240 mL    OUT:  Total OUT: 0 mL    Total NET: 240 mL        RADIOLOGY & ADDITIONAL TESTS:  < from: CT Head No Cont (19 @ 12:51) >   EXAM:  CT BRAIN                        PROCEDURE DATE:  2019    IMPRESSION: Stable to improved multicompartment intracranial hemorrhages.   No new hemorrhage.  < end of copied text >

## 2019-05-29 NOTE — PROGRESS NOTE ADULT - ATTENDING COMMENTS
Seen and examined.    NAD  awake and alert, non focal6  non labored resp  RRR    Stable CT with hemorrhagic contusion.  No further seizures here, no clinical signs of EtOH withdrawal.  Pt reports has never had withdrawal like symptoms when he has not had a drink.  EEG completed.  No seizures.  MRI pending.  Start DVT ppx tonight.  OK for transfer to floor.
NSGY Attg:    see above    patient seen and examined    agree with plan as documented

## 2019-05-29 NOTE — PROGRESS NOTE ADULT - ASSESSMENT
73y Male with intraparenchymal contusion  Neuro: stable from a tbi prospective, improving expressive aphagia, no adronergic signs of EtOH withdrawal, will keep patient on CIWA protocol without prophalactic meds. Continue AED, appriciate neurology input  Pulm: oob PT/OT/PMR  Card: no new issues  GI: full enteral nutrtiion  Renal: will d/c 2% saline sodium ok and osmolality ok  ID: no issues  PPX: dvt chemo ppx tonight

## 2019-05-29 NOTE — PROGRESS NOTE ADULT - ASSESSMENT
73ym s/p fall with ct of the brain stable as per today.  1 increase diet and activity as tolerated.  2. Pt will need to be on CIWA protocol  3. Pr has been seen by Physical this am  4. Ct of the brain stable.  5. No Neurosurgical intervention needed.   Pt will be followed as an outpt 1-2 weeks.  Pt seen with Dr. Sanchez, 73ym s/p fall with ct of the brain stable as per today.  1 increase diet and activity as tolerated.  2. Pt will need to be on CIWA protocol  3. Pr has been seen by Physical this am  4. Ct of the brain stable.  5. No Neurosurgical intervention needed.   Pt will be followed as an outpt 1-2 weeks.  6. Continue Keppra since the patient was noted to have seizure during hosp to [place].  7. Neurology following pt and recommending MRI of the brain and EEG  Pt seen with Dr. Sanchez,

## 2019-05-30 ENCOUNTER — TRANSCRIPTION ENCOUNTER (OUTPATIENT)
Age: 74
End: 2019-05-30

## 2019-05-30 VITALS
RESPIRATION RATE: 16 BRPM | HEART RATE: 66 BPM | SYSTOLIC BLOOD PRESSURE: 132 MMHG | TEMPERATURE: 98 F | DIASTOLIC BLOOD PRESSURE: 78 MMHG | OXYGEN SATURATION: 98 %

## 2019-05-30 LAB
ANION GAP SERPL CALC-SCNC: 9 MMOL/L — SIGNIFICANT CHANGE UP (ref 5–17)
BASOPHILS # BLD AUTO: 0 K/UL — SIGNIFICANT CHANGE UP (ref 0–0.2)
BASOPHILS NFR BLD AUTO: 0.4 % — SIGNIFICANT CHANGE UP (ref 0–2)
BUN SERPL-MCNC: 15 MG/DL — SIGNIFICANT CHANGE UP (ref 8–20)
CALCIUM SERPL-MCNC: 8.7 MG/DL — SIGNIFICANT CHANGE UP (ref 8.6–10.2)
CHLORIDE SERPL-SCNC: 109 MMOL/L — HIGH (ref 98–107)
CO2 SERPL-SCNC: 23 MMOL/L — SIGNIFICANT CHANGE UP (ref 22–29)
CREAT SERPL-MCNC: 0.75 MG/DL — SIGNIFICANT CHANGE UP (ref 0.5–1.3)
EOSINOPHIL # BLD AUTO: 0.1 K/UL — SIGNIFICANT CHANGE UP (ref 0–0.5)
EOSINOPHIL NFR BLD AUTO: 1.4 % — SIGNIFICANT CHANGE UP (ref 0–5)
GLUCOSE SERPL-MCNC: 102 MG/DL — SIGNIFICANT CHANGE UP (ref 70–115)
HCT VFR BLD CALC: 33.5 % — LOW (ref 42–52)
HGB BLD-MCNC: 11.4 G/DL — LOW (ref 14–18)
LYMPHOCYTES # BLD AUTO: 1.2 K/UL — SIGNIFICANT CHANGE UP (ref 1–4.8)
LYMPHOCYTES # BLD AUTO: 23.2 % — SIGNIFICANT CHANGE UP (ref 20–55)
MAGNESIUM SERPL-MCNC: 1.9 MG/DL — SIGNIFICANT CHANGE UP (ref 1.6–2.6)
MCHC RBC-ENTMCNC: 28.7 PG — SIGNIFICANT CHANGE UP (ref 27–31)
MCHC RBC-ENTMCNC: 34 G/DL — SIGNIFICANT CHANGE UP (ref 32–36)
MCV RBC AUTO: 84.4 FL — SIGNIFICANT CHANGE UP (ref 80–94)
MONOCYTES # BLD AUTO: 0.6 K/UL — SIGNIFICANT CHANGE UP (ref 0–0.8)
MONOCYTES NFR BLD AUTO: 12 % — HIGH (ref 3–10)
NEUTROPHILS # BLD AUTO: 3.2 K/UL — SIGNIFICANT CHANGE UP (ref 1.8–8)
NEUTROPHILS NFR BLD AUTO: 62.8 % — SIGNIFICANT CHANGE UP (ref 37–73)
OSMOLALITY SERPL: 297 MOSM/KG — SIGNIFICANT CHANGE UP (ref 280–300)
PHOSPHATE SERPL-MCNC: 3.2 MG/DL — SIGNIFICANT CHANGE UP (ref 2.4–4.7)
PLATELET # BLD AUTO: 105 K/UL — LOW (ref 150–400)
POTASSIUM SERPL-MCNC: 3.6 MMOL/L — SIGNIFICANT CHANGE UP (ref 3.5–5.3)
POTASSIUM SERPL-SCNC: 3.6 MMOL/L — SIGNIFICANT CHANGE UP (ref 3.5–5.3)
RBC # BLD: 3.97 M/UL — LOW (ref 4.6–6.2)
RBC # FLD: 14 % — SIGNIFICANT CHANGE UP (ref 11–15.6)
SODIUM SERPL-SCNC: 141 MMOL/L — SIGNIFICANT CHANGE UP (ref 135–145)
WBC # BLD: 5 K/UL — SIGNIFICANT CHANGE UP (ref 4.8–10.8)
WBC # FLD AUTO: 5 K/UL — SIGNIFICANT CHANGE UP (ref 4.8–10.8)

## 2019-05-30 PROCEDURE — 80076 HEPATIC FUNCTION PANEL: CPT

## 2019-05-30 PROCEDURE — 90715 TDAP VACCINE 7 YRS/> IM: CPT

## 2019-05-30 PROCEDURE — 70450 CT HEAD/BRAIN W/O DYE: CPT

## 2019-05-30 PROCEDURE — 97167 OT EVAL HIGH COMPLEX 60 MIN: CPT

## 2019-05-30 PROCEDURE — 71045 X-RAY EXAM CHEST 1 VIEW: CPT

## 2019-05-30 PROCEDURE — 73030 X-RAY EXAM OF SHOULDER: CPT

## 2019-05-30 PROCEDURE — 85027 COMPLETE CBC AUTOMATED: CPT

## 2019-05-30 PROCEDURE — 80048 BASIC METABOLIC PNL TOTAL CA: CPT

## 2019-05-30 PROCEDURE — 83605 ASSAY OF LACTIC ACID: CPT

## 2019-05-30 PROCEDURE — 99238 HOSP IP/OBS DSCHRG MGMT 30/<: CPT

## 2019-05-30 PROCEDURE — 73110 X-RAY EXAM OF WRIST: CPT

## 2019-05-30 PROCEDURE — 70551 MRI BRAIN STEM W/O DYE: CPT | Mod: 26

## 2019-05-30 PROCEDURE — 97163 PT EVAL HIGH COMPLEX 45 MIN: CPT

## 2019-05-30 PROCEDURE — 86803 HEPATITIS C AB TEST: CPT

## 2019-05-30 PROCEDURE — 73130 X-RAY EXAM OF HAND: CPT

## 2019-05-30 PROCEDURE — 83735 ASSAY OF MAGNESIUM: CPT

## 2019-05-30 PROCEDURE — 85730 THROMBOPLASTIN TIME PARTIAL: CPT

## 2019-05-30 PROCEDURE — 97110 THERAPEUTIC EXERCISES: CPT

## 2019-05-30 PROCEDURE — 95819 EEG AWAKE AND ASLEEP: CPT

## 2019-05-30 PROCEDURE — 86901 BLOOD TYPING SEROLOGIC RH(D): CPT

## 2019-05-30 PROCEDURE — 73080 X-RAY EXAM OF ELBOW: CPT

## 2019-05-30 PROCEDURE — 83690 ASSAY OF LIPASE: CPT

## 2019-05-30 PROCEDURE — 80053 COMPREHEN METABOLIC PANEL: CPT

## 2019-05-30 PROCEDURE — 97530 THERAPEUTIC ACTIVITIES: CPT

## 2019-05-30 PROCEDURE — 70551 MRI BRAIN STEM W/O DYE: CPT

## 2019-05-30 PROCEDURE — 84100 ASSAY OF PHOSPHORUS: CPT

## 2019-05-30 PROCEDURE — 92523 SPEECH SOUND LANG COMPREHEN: CPT

## 2019-05-30 PROCEDURE — 86900 BLOOD TYPING SEROLOGIC ABO: CPT

## 2019-05-30 PROCEDURE — 99285 EMERGENCY DEPT VISIT HI MDM: CPT | Mod: 25

## 2019-05-30 PROCEDURE — 36415 COLL VENOUS BLD VENIPUNCTURE: CPT

## 2019-05-30 PROCEDURE — 86850 RBC ANTIBODY SCREEN: CPT

## 2019-05-30 PROCEDURE — 81001 URINALYSIS AUTO W/SCOPE: CPT

## 2019-05-30 PROCEDURE — 85610 PROTHROMBIN TIME: CPT

## 2019-05-30 PROCEDURE — 83930 ASSAY OF BLOOD OSMOLALITY: CPT

## 2019-05-30 PROCEDURE — 93005 ELECTROCARDIOGRAM TRACING: CPT

## 2019-05-30 PROCEDURE — 97116 GAIT TRAINING THERAPY: CPT

## 2019-05-30 PROCEDURE — 73060 X-RAY EXAM OF HUMERUS: CPT

## 2019-05-30 RX ORDER — FOLIC ACID 0.8 MG
1 TABLET ORAL
Qty: 30 | Refills: 0
Start: 2019-05-30

## 2019-05-30 RX ORDER — THIAMINE MONONITRATE (VIT B1) 100 MG
1 TABLET ORAL
Qty: 30 | Refills: 0
Start: 2019-05-30 | End: 2019-06-28

## 2019-05-30 RX ORDER — ACETAMINOPHEN 500 MG
2 TABLET ORAL
Qty: 0 | Refills: 0 | DISCHARGE
Start: 2019-05-30

## 2019-05-30 RX ORDER — POTASSIUM CHLORIDE 20 MEQ
40 PACKET (EA) ORAL ONCE
Refills: 0 | Status: COMPLETED | OUTPATIENT
Start: 2019-05-30 | End: 2019-05-30

## 2019-05-30 RX ORDER — LEVETIRACETAM 250 MG/1
1 TABLET, FILM COATED ORAL
Qty: 60 | Refills: 0
Start: 2019-05-30 | End: 2019-06-28

## 2019-05-30 RX ADMIN — Medication 105 MILLIGRAM(S): at 13:57

## 2019-05-30 RX ADMIN — LEVETIRACETAM 420 MILLIGRAM(S): 250 TABLET, FILM COATED ORAL at 09:40

## 2019-05-30 RX ADMIN — Medication 1 MILLIGRAM(S): at 12:30

## 2019-05-30 RX ADMIN — Medication 40 MILLIEQUIVALENT(S): at 12:30

## 2019-05-30 RX ADMIN — ENOXAPARIN SODIUM 40 MILLIGRAM(S): 100 INJECTION SUBCUTANEOUS at 12:30

## 2019-05-30 NOTE — DISCHARGE NOTE PROVIDER - NSDCACTIVITY_GEN_ALL_CORE
Return to Work/School allowed/Walking - Indoors allowed/Bathing allowed/Walking - Outdoors allowed/Showering allowed

## 2019-05-30 NOTE — DISCHARGE NOTE PROVIDER - CARE PROVIDERS DIRECT ADDRESSES
,DirectAddress_Unknown,[email or web address]

## 2019-05-30 NOTE — SPEECH LANGUAGE PATHOLOGY EVALUATION - SLP PERTINENT HISTORY OF CURRENT PROBLEM
As per MD note: "transferred from [facility] to [facility] on 5/28/19 after suffering a fall 2 days prior to admission. He reports that he was at a party and was drinking, suffering an unwitnessed fall later that evening. The following day he went to his neighbors house and was noted to have altered mental status. He was also noted to have abrasions on bilateral elbows. He later went to  his wife at the airport and was noted to be confused. He was then brought to [facility] and was found to have acute bilateral SDH and SAH. At [facility], he was also noted to have a seizure and received Keppra and Ativan. At [facility], GCS = 9. Primary survey intact."

## 2019-05-30 NOTE — PROGRESS NOTE ADULT - ASSESSMENT
A/P:   74yo male s/p fall while intoxicated, b/l SDH, SAH.   -f/u speech eval  -f/u neurology  -f/u PT/OT/PMR  -Can restart Lovenox 5/30.

## 2019-05-30 NOTE — DISCHARGE NOTE PROVIDER - HOSPITAL COURSE
72yo presents to [facility] after fall. Pt was transferred from [facility] after initial fall 2 days before admission. He was at party Sunday night where he admitted to do drinking tequila. He had unwitnessed fall later in evening.  The following day the pt went to his neighbors house to get his glasses but was acting off and confused, along with abrasions to bilateral elbows. Pt told them that he also woke up with blood to pillow with no recollection of a fall.  Pt Subsequently went to  his wife from the airport whom also noted pt to be confused. He was subsequently brought to hospital for evaluation. At [place], CT Head evident for acute bilateral subdural hemorrhages and subarachnoid hemorrhage. Pt was also noted to have seizure at [place].  Pt was then subsequently taken to SICU where he was found to be neuro intact with stable repeat CT.  Neurosurgery saw patient and signed off after assessing and finding patient neurologically stable.  Neurology was following patient and recommended EEG and MRI.  Patient received EEG while in SICU yesterday and MRI when downgraded to floor today. Upon review, neurology cleared patient for discharge and was told to followup in their office with continuation of anti-seizure meds.  PT/OT/PMR also cleared patient for discharge to home with intermittent supervision.  Speech pathology also cleared patient for discharge with no need for further followup for cognitive evaluation.  From our service, patient was stable for discharge and to follow up with Neurology, neurosurgery and TBI clinics.

## 2019-05-30 NOTE — PROGRESS NOTE ADULT - REASON FOR ADMISSION
Fall/Subdural Hemorrhage

## 2019-05-30 NOTE — CONSULT NOTE ADULT - SUBJECTIVE AND OBJECTIVE BOX
HPI:  Mr. Dudley is a 73 year old male with PMHx of BPH who was transferred from [facility] to [facility] on 5/28/19 after suffering a fall 2 days prior to admission. He reports that he was at a party and was drinking, suffering an unwitnessed fall later that evening. The following day he went to his neighbors house and was noted to have altered mental status. He was also noted to have abrasions on bilateral elbows. He later went to  his wife at the airport and was noted to be confused. He was then brought to [facility] and was found to have acute bilateral SDH and SAH. At [facility], he was also noted to have a seizure and received Keppra and Ativan. At [facility], GCS = 9. Primary survey intact.    REVIEW OF SYSTEMS    PAST MEDICAL & SURGICAL HISTORY  Benign prostatic hyperplasia  No significant past surgical history    SOCIAL HISTORY    Smoking - Denied  EtOH - Denied   Drugs - Denied    FAMILY HISTORY   Reviewed and non-contributory    ALLERGIES  Allergy Status Unknown  No Known Allergies    VITALS  T(C): 36.7 (05-30-19 @ 08:42)  T(F): 98 (05-30-19 @ 08:42), Max: 98.8 (05-29-19 @ 10:00)  HR: 67 (05-30-19 @ 08:42) (66 - 79)  BP: 139/79 (05-30-19 @ 08:42) (139/66 - 177/78)  RR:  (18 - 24)  SpO2:  (95% - 99%)  Wt(kg): --    FUNCTIONAL HISTORY  _______ hand dominant  Lives with spouse in a private home with 0 JOHNNY and 9 STI + HR  Independent in ambulation, ADL's, transfers prior to hospitalization    CURRENT FUNCTIONAL STATUS  Bed Mobility: Rolling/Turning:     · Level of [place]	independent	    Bed Mobility: Sit to Supine:     · Level of [place]	independent	    Bed Mobility: Supine to Sit:     · Level of [place]	independent	    Transfer: Bed to Chair:     Transfer Skill: Bed to Chair   · Level of [place]	supervision	  · Physical Assist/Nonphysical Assist	supervision; verbal cues	    Transfer: Chair to Bed:     · Level of [place]	supervision	  · Physical Assist/Nonphysical Assist	supervision; verbal cues	    Transfer: Sit to Stand:     · Level of [place]	supervision	  · Physical Assist/Nonphysical Assist	supervision; verbal cues	    Transfer: Stand to Sit:     · Level of [place]	supervision	  · Physical Assist/Nonphysical Assist	supervision; verbal cues	    Gait Skills:     · Level of [place]	supervision	  · Physical Assist/Nonphysical Assist	supervision; verbal cues	  · Gait Distance	150 feet	      RECENT LABS/IMAGING             11.4   5.0   )-----------( 105      ( 30 May 2019 06:58 )             33.5     141  |  109<H>  |  15.0  ----------------------------<  102  3.6   |  23.0  |  0.75    Ca    8.7      30 May 2019 06:58  Phos  3.2     05-30  Mg     1.9     05-30    TPro  6.0<L>  /  Alb  3.7  /  TBili  1.9  /  DBili  0.3  /  AST  57<H>  /  ALT  28  /  AlkPhos  62  05-28    MEDICATIONS   MEDICATIONS  (STANDING):  enoxaparin Injectable 40 milliGRAM(s) SubCutaneous daily  folic acid 1 milliGRAM(s) Oral daily  levETIRAcetam  IVPB 500 milliGRAM(s) IV Intermittent every 12 hours  potassium chloride    Tablet ER 40 milliEquivalent(s) Oral once  sodium chloride 2% . 1000 milliLiter(s) (30 mL/Hr) IV Continuous <Continuous>  tamsulosin 0.8 milliGRAM(s) Oral at bedtime  thiamine IVPB 500 milliGRAM(s) IV Intermittent daily    MEDICATIONS  (PRN):  acetaminophen   Tablet .. 650 milliGRAM(s) Oral every 6 hours PRN Mild Pain (1 - 3)  HYDROmorphone  Injectable 0.5 milliGRAM(s) IV Push every 4 hours PRN Moderate Pain (4 - 6)    PHYSICAL EXAM    ASSESSMENT/PLAN  74 y/o male with an unwitnessed fall suffering bilateral SDH and SAH, managed non-operatively.     H/O EtOH use - Thiamine, Folic acid  Post-traumatic seizure - Keppra  DVT PPx - Lovenox  BPH - Tamsulosin  Pain control - Tylenol PRN, Dilaudid PRN    Rehab -  Precautions - Falls, Cardiac  Weight bearing status - Full weight bearing HPI:  Mr. Dudley is a 73 year old male with PMHx of BPH who was transferred from [facility] to [facility] on 5/28/19 after suffering a fall 2 days prior to admission. He reports that he was at a party and was drinking, suffering an unwitnessed fall later that evening. The following day he went to his neighbors house and was noted to have altered mental status. He was also noted to have abrasions on bilateral elbows. He later went to  his wife at the airport and was noted to be confused. He was then brought to [facility] and was found to have acute bilateral SDH and SAH. At [facility], he was also noted to have a seizure and received Keppra and Ativan. At [facility], GCS = 9. Primary survey intact.    REVIEW OF SYSTEMS  Constitutional - No fever, No fatigue, No dizziness  HEENT - +chronic visual disturbances, No difficulty hearing,  No neck pain, No photophobia, No phonophobia  Respiratory - No cough, No wheezing, No shortness of breath  Cardiovascular - No chest pain, No palpitations  Gastrointestinal - No abdominal pain, No nausea, No vomiting, No diarrhea, No constipation  Genitourinary - No dysuria, No frequency, No hematuria, No incontinence  Neurological - No headaches, No loss of strength, No numbness  Musculoskeletal - No joint pain, No joint swelling, No muscle pain  Psychiatric - No depression, No anxiety  All other review of systems negative    PAST MEDICAL & SURGICAL HISTORY  Benign prostatic hyperplasia  No significant past surgical history    SOCIAL HISTORY    Currently working  Smoking - Denied  EtOH - Daily wine/beer  Drugs - Denied    FAMILY HISTORY   Reviewed and non-contributory    ALLERGIES  Allergy Status Unknown  No Known Allergies    VITALS  T(C): 36.7 (05-30-19 @ 08:42)  T(F): 98 (05-30-19 @ 08:42), Max: 98.8 (05-29-19 @ 10:00)  HR: 67 (05-30-19 @ 08:42) (66 - 79)  BP: 139/79 (05-30-19 @ 08:42) (139/66 - 177/78)  RR:  (18 - 24)  SpO2:  (95% - 99%)  Wt(kg): --    FUNCTIONAL HISTORY  Right hand dominant  Lives with spouse in a private home with 0 JOHNNY and 6 stairs upstairs and 5 stairs downstairs  Independent in ambulation, ADL's, transfers prior to hospitalization    CURRENT FUNCTIONAL STATUS  Bed Mobility: Rolling/Turning:     · Level of [place]	independent	    Bed Mobility: Sit to Supine:     · Level of [place]	independent	    Bed Mobility: Supine to Sit:     · Level of [place]	independent	    Transfer: Bed to Chair:     Transfer Skill: Bed to Chair   · Level of [place]	supervision	  · Physical Assist/Nonphysical Assist	supervision; verbal cues	    Transfer: Chair to Bed:     · Level of [place]	supervision	  · Physical Assist/Nonphysical Assist	supervision; verbal cues	    Transfer: Sit to Stand:     · Level of [place]	supervision	  · Physical Assist/Nonphysical Assist	supervision; verbal cues	    Transfer: Stand to Sit:     · Level of [place]	supervision	  · Physical Assist/Nonphysical Assist	supervision; verbal cues	    Gait Skills:     · Level of [place]	supervision	  · Physical Assist/Nonphysical Assist	supervision; verbal cues	  · Gait Distance	150 feet	      RECENT LABS/IMAGING             11.4   5.0   )-----------( 105      ( 30 May 2019 06:58 )             33.5     141  |  109<H>  |  15.0  ----------------------------<  102  3.6   |  23.0  |  0.75    Ca    8.7      30 May 2019 06:58  Phos  3.2     05-30  Mg     1.9     05-30    TPro  6.0<L>  /  Alb  3.7  /  TBili  1.9  /  DBili  0.3  /  AST  57<H>  /  ALT  28  /  AlkPhos  62  05-28    MEDICATIONS   MEDICATIONS  (STANDING):  enoxaparin Injectable 40 milliGRAM(s) SubCutaneous daily  folic acid 1 milliGRAM(s) Oral daily  levETIRAcetam  IVPB 500 milliGRAM(s) IV Intermittent every 12 hours  potassium chloride    Tablet ER 40 milliEquivalent(s) Oral once  sodium chloride 2% . 1000 milliLiter(s) (30 mL/Hr) IV Continuous <Continuous>  tamsulosin 0.8 milliGRAM(s) Oral at bedtime  thiamine IVPB 500 milliGRAM(s) IV Intermittent daily    MEDICATIONS  (PRN):  acetaminophen   Tablet .. 650 milliGRAM(s) Oral every 6 hours PRN Mild Pain (1 - 3)  HYDROmorphone  Injectable 0.5 milliGRAM(s) IV Push every 4 hours PRN Moderate Pain (4 - 6)    PHYSICAL EXAM  Constitutional - NAD, Comfortable  HEENT - NCAT, Saccades  Chest - No increased work of breathing  Cardiovascular - All extremities well perfused, S1S2  Abdomen - Soft, Non-distended  Extremities - No bilateral lower extremity edema  Neurologic Exam -                    Cognitive - Awake, Alert, Oriented to self, place, date, year, situation     Communication - Fluent, No dysarthria, Naming intact     Attention - Intact, able to recite days of week backwards     Memory - Intact, able to recall 3/3 items after 3 minutes     Cranial Nerves - CN 2-12 grossly intact     Motor -                     LEFT    UE - ShAB 5/5, EF 5/5, EE 5/5, WE 5/5,  5/5                    RIGHT UE - ShAB 5/5, EF 5/5, EE 5/5, WE 5/5,  5/5                    LEFT    LE - HF 5/5, KE 5/5, DF 5/5, PF 5/5                    RIGHT LE - HF 5/5, KE 5/5, DF 5/5, PF 5/5        Sensory - Intact to light touch diffusely     Coordination - Finger-to-nose intact bilaterally   Psychiatric - Affect WNL    ASSESSMENT/PLAN  74 y/o male with an unwitnessed fall suffering bilateral SDH and SAH, managed non-operatively.     H/O EtOH use - Thiamine, Folic acid  Post-traumatic seizure - Keppra  DVT PPx - Lovenox  BPH - Tamsulosin  Pain control - Tylenol PRN, Dilaudid PRN    Rehab - When medically optimized, recommend patient be discharged home. Patient educated on need to remain out of work and driving at this time until cleared by physicians to return. Continue mobilization with staff to avoid complications of immobility. After discharge patient, can follow up near home in [place] with brain injury physician in the area, Dr. Sybil Cazares.  Precautions - Falls, Cardiac  Weight bearing status - Full weight bearing    Will sign off, please re-consult if any further questions arise. HPI:  Mr. Dudley is a 73 year old male with PMHx of BPH who was transferred from [facility] to [facility] on 5/28/19 after suffering a fall 2 days prior to admission. He reports that he was at a party and was drinking, suffering an unwitnessed fall later that evening. The following day he went to his neighbors house and was noted to have altered mental status. He was also noted to have abrasions on bilateral elbows. He later went to  his wife at the airport and was noted to be confused. He was then brought to [facility] and was found to have acute bilateral SDH and SAH. At [facility], he was also noted to have a seizure and received Keppra and Ativan. At [facility], GCS = 9. Primary survey intact.    REVIEW OF SYSTEMS  Constitutional - No fever, No fatigue, No dizziness  HEENT - +chronic visual disturbances, No difficulty hearing,  No neck pain, No photophobia, No phonophobia  Respiratory - No cough, No wheezing, No shortness of breath  Cardiovascular - No chest pain, No palpitations  Gastrointestinal - No abdominal pain, No nausea, No vomiting, No diarrhea, No constipation  Genitourinary - No dysuria, No frequency, No hematuria, No incontinence  Neurological - No headaches, No loss of strength, No numbness  Musculoskeletal - No joint pain, No joint swelling, No muscle pain  Psychiatric - No depression, No anxiety  All other review of systems negative    PAST MEDICAL & SURGICAL HISTORY  Benign prostatic hyperplasia  No significant past surgical history    SOCIAL HISTORY    Currently working  Smoking - Denied  EtOH - Daily wine/beer  Drugs - Denied    FAMILY HISTORY   Reviewed and non-contributory    ALLERGIES  Allergy Status Unknown  No Known Allergies    VITALS  T(C): 36.7 (05-30-19 @ 08:42)  T(F): 98 (05-30-19 @ 08:42), Max: 98.8 (05-29-19 @ 10:00)  HR: 67 (05-30-19 @ 08:42) (66 - 79)  BP: 139/79 (05-30-19 @ 08:42) (139/66 - 177/78)  RR:  (18 - 24)  SpO2:  (95% - 99%)  Wt(kg): --    FUNCTIONAL HISTORY  Right hand dominant  Lives with spouse in a private home with 0 JOHNNY and 6 stairs upstairs and 5 stairs downstairs  Independent in ambulation, ADL's, transfers prior to hospitalization    CURRENT FUNCTIONAL STATUS  Bed Mobility: Rolling/Turning:     · Level of [place]	independent	    Bed Mobility: Sit to Supine:     · Level of [place]	independent	    Bed Mobility: Supine to Sit:     · Level of [place]	independent	    Transfer: Bed to Chair:     Transfer Skill: Bed to Chair   · Level of [place]	supervision	  · Physical Assist/Nonphysical Assist	supervision; verbal cues	    Transfer: Chair to Bed:     · Level of [place]	supervision	  · Physical Assist/Nonphysical Assist	supervision; verbal cues	    Transfer: Sit to Stand:     · Level of [place]	supervision	  · Physical Assist/Nonphysical Assist	supervision; verbal cues	    Transfer: Stand to Sit:     · Level of [place]	supervision	  · Physical Assist/Nonphysical Assist	supervision; verbal cues	    Gait Skills:     · Level of [place]	supervision	  · Physical Assist/Nonphysical Assist	supervision; verbal cues	  · Gait Distance	150 feet	      RECENT LABS/IMAGING             11.4   5.0   )-----------( 105      ( 30 May 2019 06:58 )             33.5     141  |  109<H>  |  15.0  ----------------------------<  102  3.6   |  23.0  |  0.75    Ca    8.7      30 May 2019 06:58  Phos  3.2     05-30  Mg     1.9     05-30    TPro  6.0<L>  /  Alb  3.7  /  TBili  1.9  /  DBili  0.3  /  AST  57<H>  /  ALT  28  /  AlkPhos  62  05-28    MEDICATIONS   MEDICATIONS  (STANDING):  enoxaparin Injectable 40 milliGRAM(s) SubCutaneous daily  folic acid 1 milliGRAM(s) Oral daily  levETIRAcetam  IVPB 500 milliGRAM(s) IV Intermittent every 12 hours  potassium chloride    Tablet ER 40 milliEquivalent(s) Oral once  sodium chloride 2% . 1000 milliLiter(s) (30 mL/Hr) IV Continuous <Continuous>  tamsulosin 0.8 milliGRAM(s) Oral at bedtime  thiamine IVPB 500 milliGRAM(s) IV Intermittent daily    MEDICATIONS  (PRN):  acetaminophen   Tablet .. 650 milliGRAM(s) Oral every 6 hours PRN Mild Pain (1 - 3)  HYDROmorphone  Injectable 0.5 milliGRAM(s) IV Push every 4 hours PRN Moderate Pain (4 - 6)    PHYSICAL EXAM  Constitutional - NAD, Comfortable  HEENT - NCAT, Saccades  Chest - No increased work of breathing  Cardiovascular - All extremities well perfused, S1S2  Abdomen - Soft, Non-distended  Extremities - No bilateral lower extremity edema  Neurologic Exam -                    Cognitive - Awake, Alert, Oriented to self, place, date, year, situation     Communication - Fluent, No dysarthria, Naming intact     Attention - Intact, able to recite days of week backwards     Memory - Intact, able to recall 3/3 items after 3 minutes     Cranial Nerves - CN 2-12 grossly intact     Motor -                     LEFT    UE - ShAB 5/5, EF 5/5, EE 5/5, WE 5/5,  5/5                    RIGHT UE - ShAB 5/5, EF 5/5, EE 5/5, WE 5/5,  5/5                    LEFT    LE - HF 5/5, KE 5/5, DF 5/5, PF 5/5                    RIGHT LE - HF 5/5, KE 5/5, DF 5/5, PF 5/5        Sensory - Intact to light touch diffusely     Coordination - Finger-to-nose intact bilaterally   Psychiatric - Affect WNL    ASSESSMENT/PLAN  72 y/o male with an unwitnessed fall suffering bilateral SDH and SAH, managed non-operatively.     H/O EtOH use - Thiamine, Folic acid  Post-traumatic seizure - Keppra  DVT PPx - Lovenox  BPH - Tamsulosin  Pain control - Tylenol PRN, Dilaudid PRN    Rehab - When medically optimized, recommend patient be discharged home with intermittent supervision. Patient educated on need to remain out of work and driving at this time until cleared by physicians to return. Continue mobilization with staff to avoid complications of immobility. After discharge patient, can follow up near home in [place] with brain injury physician in the area, Dr. Sybil Cazares.  Precautions - Falls, Cardiac  Weight bearing status - Full weight bearing    Will sign off, please re-consult if any further questions arise. HPI:  Mr. Dudley is a 73 year old male with PMHx of BPH who was transferred from [facility] to [facility] on 5/28/19 after suffering a fall 2 days prior to admission. He reports that he was at a party and was drinking, suffering an unwitnessed fall later that evening. The following day he went to his neighbors house and was noted to have altered mental status. He was also noted to have abrasions on bilateral elbows. He later went to  his wife at the airport and was noted to be confused. He was then brought to [facility] and was found to have acute bilateral SDH and SAH. At [facility], he was also noted to have a seizure and received Keppra and Ativan. At [facility], GCS = 9. Primary survey intact.    REVIEW OF SYSTEMS  Constitutional - No fever, No fatigue, No dizziness  HEENT - +chronic visual disturbances, No difficulty hearing,  No neck pain, No photophobia, No phonophobia  Respiratory - No cough, No wheezing, No shortness of breath  Cardiovascular - No chest pain, No palpitations  Gastrointestinal - No abdominal pain, No nausea, No vomiting, No diarrhea, No constipation  Genitourinary - No dysuria, No frequency, No hematuria, No incontinence  Neurological - No headaches, No loss of strength, No numbness  Musculoskeletal - No joint pain, No joint swelling, No muscle pain  Psychiatric - No depression, No anxiety  All other review of systems negative    PAST MEDICAL & SURGICAL HISTORY  Benign prostatic hyperplasia  No significant past surgical history    SOCIAL HISTORY    Currently working  Smoking - Denied  EtOH - Daily wine/beer  Drugs - Denied    FAMILY HISTORY   Reviewed and non-contributory    ALLERGIES  Allergy Status Unknown  No Known Allergies    VITALS  T(C): 36.7 (05-30-19 @ 08:42)  T(F): 98 (05-30-19 @ 08:42), Max: 98.8 (05-29-19 @ 10:00)  HR: 67 (05-30-19 @ 08:42) (66 - 79)  BP: 139/79 (05-30-19 @ 08:42) (139/66 - 177/78)  RR:  (18 - 24)  SpO2:  (95% - 99%)  Wt(kg): --    FUNCTIONAL HISTORY  Right hand dominant  Lives with spouse in a private home with 0 JOHNNY and 6 stairs upstairs and 5 stairs downstairs  Independent in ambulation, ADL's, transfers prior to hospitalization    CURRENT FUNCTIONAL STATUS  Bed Mobility: Rolling/Turning:     · Level of [address]	independent	    Bed Mobility: Sit to Supine:     · Level of [address]	independent	    Bed Mobility: Supine to Sit:     · Level of [address]	independent	    Transfer: Bed to Chair:     Transfer Skill: Bed to Chair   · Level of [address]	supervision	  · Physical Assist/Nonphysical Assist	supervision; verbal cues	    Transfer: Chair to Bed:     · Level of [address]	supervision	  · Physical Assist/Nonphysical Assist	supervision; verbal cues	    Transfer: Sit to Stand:     · Level of [address]	supervision	  · Physical Assist/Nonphysical Assist	supervision; verbal cues	    Transfer: Stand to Sit:     · Level of [address]	supervision	  · Physical Assist/Nonphysical Assist	supervision; verbal cues	    Gait Skills:     · Level of [address]	supervision	  · Physical Assist/Nonphysical Assist	supervision; verbal cues	  · Gait Distance	150 feet	      RECENT LABS/IMAGING             11.4   5.0   )-----------( 105      ( 30 May 2019 06:58 )             33.5     141  |  109<H>  |  15.0  ----------------------------<  102  3.6   |  23.0  |  0.75    Ca    8.7      30 May 2019 06:58  Phos  3.2     05-30  Mg     1.9     05-30    TPro  6.0<L>  /  Alb  3.7  /  TBili  1.9  /  DBili  0.3  /  AST  57<H>  /  ALT  28  /  AlkPhos  62  05-28    MEDICATIONS   MEDICATIONS  (STANDING):  enoxaparin Injectable 40 milliGRAM(s) SubCutaneous daily  folic acid 1 milliGRAM(s) Oral daily  levETIRAcetam  IVPB 500 milliGRAM(s) IV Intermittent every 12 hours  potassium chloride    Tablet ER 40 milliEquivalent(s) Oral once  sodium chloride 2% . 1000 milliLiter(s) (30 mL/Hr) IV Continuous <Continuous>  tamsulosin 0.8 milliGRAM(s) Oral at bedtime  thiamine IVPB 500 milliGRAM(s) IV Intermittent daily    MEDICATIONS  (PRN):  acetaminophen   Tablet .. 650 milliGRAM(s) Oral every 6 hours PRN Mild Pain (1 - 3)  HYDROmorphone  Injectable 0.5 milliGRAM(s) IV Push every 4 hours PRN Moderate Pain (4 - 6)    PHYSICAL EXAM  Constitutional - NAD, Comfortable  HEENT - NCAT, Saccades  Chest - No increased work of breathing  Cardiovascular - All extremities well perfused, S1S2  Abdomen - Soft, Non-distended  Extremities - No bilateral lower extremity edema  Neurologic Exam -                    Cognitive - Awake, Alert, Oriented to self, place, date, year, situation     Communication - Fluent, No dysarthria, Naming intact     Attention - Intact, able to recite days of week backwards     Memory - Intact, able to recall 3/3 items after 3 minutes     Cranial Nerves - CN 2-12 grossly intact     Motor -                     LEFT    UE - ShAB 5/5, EF 5/5, EE 5/5, WE 5/5,  5/5                    RIGHT UE - ShAB 5/5, EF 5/5, EE 5/5, WE 5/5,  5/5                    LEFT    LE - HF 5/5, KE 5/5, DF 5/5, PF 5/5                    RIGHT LE - HF 5/5, KE 5/5, DF 5/5, PF 5/5        Sensory - Intact to light touch diffusely     Coordination - Finger-to-nose intact bilaterally   Psychiatric - Affect WNL    ASSESSMENT/PLAN  74 y/o male with an unwitnessed fall suffering bilateral SDH and SAH, managed non-operatively.     H/O EtOH use - Thiamine, Folic acid  Early post-traumatic seizure - Keppra  DVT PPx - Lovenox  BPH - Tamsulosin  Pain control - Tylenol PRN, Dilaudid PRN    Rehab - When medically optimized, recommend patient be discharged home with intermittent supervision. Patient educated on need to remain out of work and driving at this time until cleared by physicians to return. Continue mobilization with staff to avoid complications of immobility. After discharge patient, can follow up near home in [place] with brain injury physician in the area, Dr. Sybil Cazares.  Precautions - Falls, Cardiac  Weight bearing status - Full weight bearing    Will sign off, please re-consult if any further questions arise.

## 2019-05-30 NOTE — DISCHARGE NOTE PROVIDER - NSDCCPCAREPLAN_GEN_ALL_CORE_FT
PRINCIPAL DISCHARGE DIAGNOSIS  Diagnosis: Subdural hematoma  Assessment and Plan of Treatment:       SECONDARY DISCHARGE DIAGNOSES  Diagnosis: Traumatic intraventricular hemorrhage  Assessment and Plan of Treatment: Traumatic intraventricular hemorrhage    Diagnosis: Traumatic subarachnoid hemorrhage  Assessment and Plan of Treatment: Traumatic subarachnoid hemorrhage

## 2019-05-30 NOTE — OCCUPATIONAL THERAPY INITIAL EVALUATION ADULT - ADDITIONAL COMMENTS
Pt lives in [facility] level private home 0 JOHNNY, 4 stairs down, 5 stairs up, 1 HR.   Pt lives with wife who is able to assist.   Stall shower, standard toilet.   Pt owns no DME/AD.

## 2019-05-30 NOTE — SPEECH LANGUAGE PATHOLOGY EVALUATION - SLP DIAGNOSIS
Receptive and expressive language skills judged to be WFL. Based on informal assessment cognitive skills for recall judged to be WFL

## 2019-05-30 NOTE — DISCHARGE NOTE PROVIDER - NSDCFUADDAPPT_GEN_ALL_CORE_FT
Please call Dr. Pressley's neurology clinic to arrange for appointment in 1-2 weeks  Please call Dr. Sanchez's neurosurgery clinic to arrange for appointment in 2 weeks  Please call Dr. Ag's Brain Injury clinic to arrange for appointment in 1-2 weeks

## 2019-05-30 NOTE — DISCHARGE NOTE PROVIDER - PROVIDER TOKENS
PROVIDER:[TOKEN:[[id number]]],PROVIDER:[TOKEN:[[id number]]],PROVIDER:[TOKEN:[[id number]]]

## 2019-05-30 NOTE — PROGRESS NOTE ADULT - SUBJECTIVE AND OBJECTIVE BOX
HPI/OVERNIGHT EVENTS: Patient seen and examined at bedside this AM. No acute events overnight per nursing reports. Patient has no complaints, tolerating a regular diet, having bowel function, voiding. Denies fever, chills, nausea, vomitting, chest pain, SOB, dizziness, abd pain or any other concerning symptoms          Vital Signs Last 24 Hrs  T(C): 36.7 (30 May 2019 00:15), Max: 37.1 (29 May 2019 10:00)  T(F): 98 (30 May 2019 00:15), Max: 98.8 (29 May 2019 10:00)  HR: 70 (30 May 2019 00:15) (58 - 91)  BP: 159/80 (30 May 2019 00:15) (122/56 - 177/78)  BP(mean): 116 (29 May 2019 16:00) (80 - 116)  RR: 18 (30 May 2019 00:15) (17 - 27)  SpO2: 98% (30 May 2019 00:15) (96% - 99%)    I&O's Detail    28 May 2019 07:01  -  29 May 2019 07:00  --------------------------------------------------------  IN:    Oral Fluid: 300 mL    sodium chloride 2% .: 720 mL    Solution: 300 mL    Solution: 100 mL    Solution: 100 mL  Total IN: 1520 mL    OUT:    Voided: 2155 mL  Total OUT: 2155 mL    Total NET: -635 mL      29 May 2019 07:01  -  30 May 2019 02:36  --------------------------------------------------------  IN:    Oral Fluid: 720 mL    Solution: 250 mL  Total IN: 970 mL    OUT:    Voided: 520 mL  Total OUT: 520 mL    Total NET: 450 mL            Constitutional: patient resting comfortably in bed, in no acute distress  HEENT: EOMI / PERRL b/l   Neck: No JVD, full ROM without pain  Respiratory: CTAB, respirations are unlabored, no accessory muscle use, no conversational dyspnea  Cardiovascular: regular rate & rhythm  Gastrointestinal: Abdomen soft, non-tender, non-distended, no rebound tenderness / guarding  Neurological: GCS: 15 (4/5/6). A&O x 3; no gross sensory / motor / coordination deficits  Musculoskeletal: No joint pain, swelling or deformity; no limitation of movement     LABS:                        11.7   7.0   )-----------( 106      ( 29 May 2019 04:44 )             33.1     -    141  |  108<H>  |  12.0  ----------------------------<  110  3.6   |  23.0  |  0.77    Ca    8.5<L>      29 May 2019 04:44  Phos  1.8       Mg     2.0         TPro  6.0<L>  /  Alb  3.7  /  TBili  1.9  /  DBili  0.3  /  AST  57<H>  /  ALT  28  /  AlkPhos  62        Urinalysis Basic - ( 28 May 2019 07:14 )    Color: Yellow / Appearance: Clear / S.020 / pH: x  Gluc: x / Ketone: Small  / Bili: Negative / Urobili: Negative mg/dL   Blood: x / Protein: 30 mg/dL / Nitrite: Negative   Leuk Esterase: Negative / RBC: 6-10 /HPF / WBC 0-2   Sq Epi: x / Non Sq Epi: Occasional / Bacteria: x        MEDICATIONS  (STANDING):  chlorhexidine 2% Cloths 1 Application(s) Topical daily  enoxaparin Injectable 40 milliGRAM(s) SubCutaneous daily  folic acid 1 milliGRAM(s) Oral daily  levETIRAcetam  IVPB 500 milliGRAM(s) IV Intermittent every 12 hours  sodium chloride 2% . 1000 milliLiter(s) (30 mL/Hr) IV Continuous <Continuous>  tamsulosin 0.8 milliGRAM(s) Oral at bedtime  thiamine IVPB 500 milliGRAM(s) IV Intermittent daily    MEDICATIONS  (PRN):  acetaminophen   Tablet .. 650 milliGRAM(s) Oral every 6 hours PRN Mild Pain (1 - 3)  HYDROmorphone  Injectable 0.5 milliGRAM(s) IV Push every 4 hours PRN Moderate Pain (4 - 6)      MICRO:   Cultures     STUDIES:   EKG, CXR, U/S, CT, MRI

## 2019-05-30 NOTE — DISCHARGE NOTE NURSING/CASE MANAGEMENT/SOCIAL WORK - NSDCDPATPORTLINK_GEN_ALL_CORE
You can access the [email or web address], offered by [facility], by registering with the following website: [email or web address]

## 2019-05-31 ENCOUNTER — EMERGENCY (EMERGENCY)
Facility: HOSPITAL | Age: 74
LOS: 1 days | Discharge: HOME | End: 2019-05-31
Attending: EMERGENCY MEDICINE | Admitting: EMERGENCY MEDICINE
Payer: COMMERCIAL

## 2019-05-31 VITALS
SYSTOLIC BLOOD PRESSURE: 183 MMHG | HEART RATE: 56 BPM | WEIGHT: 152.56 LBS | DIASTOLIC BLOOD PRESSURE: 70 MMHG | HEIGHT: 67 IN | RESPIRATION RATE: 16 BRPM | OXYGEN SATURATION: 98 % | TEMPERATURE: 98 F

## 2019-05-31 DIAGNOSIS — R42 DIZZINESS AND GIDDINESS: ICD-10-CM

## 2019-05-31 PROBLEM — N40.0 BENIGN PROSTATIC HYPERPLASIA WITHOUT LOWER URINARY TRACT SYMPTOMS: Chronic | Status: ACTIVE | Noted: 2019-05-27

## 2019-05-31 LAB
ALBUMIN SERPL ELPH-MCNC: 3.4 G/DL — SIGNIFICANT CHANGE UP (ref 3.3–5)
ALP SERPL-CCNC: 68 U/L — SIGNIFICANT CHANGE UP (ref 40–120)
ALT FLD-CCNC: 53 U/L DA — HIGH (ref 10–45)
ANION GAP SERPL CALC-SCNC: 7 MMOL/L — SIGNIFICANT CHANGE UP (ref 5–17)
AST SERPL-CCNC: 33 U/L — SIGNIFICANT CHANGE UP (ref 10–40)
BILIRUB SERPL-MCNC: 1.7 MG/DL — HIGH (ref 0.2–1.2)
BUN SERPL-MCNC: 17 MG/DL — SIGNIFICANT CHANGE UP (ref 7–23)
CALCIUM SERPL-MCNC: 8.9 MG/DL — SIGNIFICANT CHANGE UP (ref 8.4–10.5)
CHLORIDE SERPL-SCNC: 103 MMOL/L — SIGNIFICANT CHANGE UP (ref 96–108)
CO2 SERPL-SCNC: 27 MMOL/L — SIGNIFICANT CHANGE UP (ref 22–31)
CREAT SERPL-MCNC: 1.04 MG/DL — SIGNIFICANT CHANGE UP (ref 0.5–1.3)
GLUCOSE SERPL-MCNC: 159 MG/DL — HIGH (ref 70–99)
HCT VFR BLD CALC: 33.6 % — LOW (ref 39–50)
HGB BLD-MCNC: 11.5 G/DL — LOW (ref 13–17)
MCHC RBC-ENTMCNC: 28.8 PG — SIGNIFICANT CHANGE UP (ref 27–34)
MCHC RBC-ENTMCNC: 34.2 GM/DL — SIGNIFICANT CHANGE UP (ref 32–36)
MCV RBC AUTO: 84.2 FL — SIGNIFICANT CHANGE UP (ref 80–100)
NRBC # BLD: 0 /100 WBCS — SIGNIFICANT CHANGE UP (ref 0–0)
PLATELET # BLD AUTO: 132 K/UL — LOW (ref 150–400)
POTASSIUM SERPL-MCNC: 4.8 MMOL/L — SIGNIFICANT CHANGE UP (ref 3.5–5.3)
POTASSIUM SERPL-SCNC: 4.8 MMOL/L — SIGNIFICANT CHANGE UP (ref 3.5–5.3)
PROT SERPL-MCNC: 6.7 G/DL — SIGNIFICANT CHANGE UP (ref 6–8.3)
RBC # BLD: 3.99 M/UL — LOW (ref 4.2–5.8)
RBC # FLD: 13.6 % — SIGNIFICANT CHANGE UP (ref 10.3–14.5)
SODIUM SERPL-SCNC: 137 MMOL/L — SIGNIFICANT CHANGE UP (ref 135–145)
TROPONIN I SERPL-MCNC: 0.02 NG/ML — SIGNIFICANT CHANGE UP (ref 0.02–0.06)
WBC # BLD: 8.36 K/UL — SIGNIFICANT CHANGE UP (ref 3.8–10.5)
WBC # FLD AUTO: 8.36 K/UL — SIGNIFICANT CHANGE UP (ref 3.8–10.5)

## 2019-05-31 PROCEDURE — 99285 EMERGENCY DEPT VISIT HI MDM: CPT

## 2019-05-31 PROCEDURE — 93010 ELECTROCARDIOGRAM REPORT: CPT

## 2019-05-31 PROCEDURE — 71045 X-RAY EXAM CHEST 1 VIEW: CPT | Mod: 26

## 2019-05-31 PROCEDURE — 70450 CT HEAD/BRAIN W/O DYE: CPT | Mod: 26

## 2019-05-31 RX ORDER — METOCLOPRAMIDE HCL 10 MG
10 TABLET ORAL ONCE
Refills: 0 | Status: DISCONTINUED | OUTPATIENT
Start: 2019-05-31 | End: 2019-05-31

## 2019-05-31 RX ORDER — MECLIZINE HCL 12.5 MG
1 TABLET ORAL
Qty: 10 | Refills: 0
Start: 2019-05-31 | End: 2019-06-02

## 2019-05-31 RX ORDER — METOCLOPRAMIDE HCL 10 MG
10 TABLET ORAL ONCE
Refills: 0 | Status: COMPLETED | OUTPATIENT
Start: 2019-05-31 | End: 2019-05-31

## 2019-05-31 RX ORDER — MECLIZINE HCL 12.5 MG
25 TABLET ORAL ONCE
Refills: 0 | Status: COMPLETED | OUTPATIENT
Start: 2019-05-31 | End: 2019-05-31

## 2019-05-31 RX ADMIN — Medication 10 MILLIGRAM(S): at 18:50

## 2019-05-31 RX ADMIN — Medication 25 MILLIGRAM(S): at 18:50

## 2019-05-31 NOTE — ED PROVIDER NOTE - OBJECTIVE STATEMENT
72 y/o M with recent diagnosis and transfer to [facility] for a subdural bleed s/p fall discharged yesterday pw dull headache, room spinning dizziness associated with position change and nausea since this am. Denies chest pain, shortness of breath, weakness, numbness, tingling. Has Neuro follow up appt. with Dr. Renan Sanchez 6/13/19  PMD- Zhen 72 y/o M with recent diagnosis and transfer to [facility] for a subdural bleed s/p fall discharged yesterday on Keppra pw dull headache, room spinning dizziness associated with position change and nausea since this am. Denies chest pain, shortness of breath, weakness, numbness, tingling. Has Neuro follow up appt. with Dr. Renan Sanchez 6/13/19  PMD- Zhen

## 2019-05-31 NOTE — ED ADULT NURSE NOTE - CHIEF COMPLAINT QUOTE
Per family "He was discharged from [facility] yesterday after having a subdural hemorrhage. They did not do surgery. He is now complaining of headache and nausea especially when changing positions. He is also pale and clammy."

## 2019-05-31 NOTE — ED ADULT NURSE NOTE - NSIMPLEMENTINTERV_GEN_ALL_ED
Implemented All Fall Risk Interventions:  [place] to call system. Call bell, personal items and telephone within reach. Instruct patient to call for assistance. Room bathroom lighting operational. Non-slip footwear when patient is off stretcher. Physically safe environment: no spills, clutter or unnecessary equipment. Stretcher in lowest position, wheels locked, appropriate side rails in place. Provide visual cue, wrist band, yellow gown, etc. Monitor gait and stability. Monitor for mental status changes and reorient to person, place, and time. Review medications for side effects contributing to fall risk. Reinforce activity limits and safety measures with patient and family.

## 2019-05-31 NOTE — ED PROVIDER NOTE - CHPI ED SYMPTOMS NEG
no weakness/no blurred vision/no loss of consciousness/no numbness/no vomiting/no nausea/no confusion/no change in level of consciousness no change in level of consciousness/no numbness/no vomiting/no weakness/no blurred vision/no loss of consciousness/no confusion

## 2019-05-31 NOTE — ED PROVIDER NOTE - CLINICAL SUMMARY MEDICAL DECISION MAKING FREE TEXT BOX
Dr. Sam: 73M h/o ETOH use, drinks a beer daily, no h/o WD, recently dx'ed with traumatic ICH, no surgery performed, pt dc'ed on [facility] yesterday. Since the fall pt has had a low grade headache and intermittent dizziness, described as room spinning, worse with head movement. No nausea or vomiting. No LOC. Pt gets PT. On exam pt is well appearing, neuro exam nml, NIHSS 0. Will get head CT to compared with MRI from yesterday and labs r/o electrolyte abnormalities

## 2019-05-31 NOTE — ED PROVIDER NOTE - PROGRESS NOTE DETAILS
PA Tiberio- labs stable. Trop negative. EKG unchanged. CT head improved when compared. Patient feeling better s/p Reglan and Meclizine. Will DC home with PMD follow up for this Monday and Neuro follow up as scheduled 6/13/19.

## 2019-05-31 NOTE — ED PROVIDER NOTE - NSFOLLOWUPINSTRUCTIONS_ED_ALL_ED_FT
Follow up with your PMD within 48-72 hrs.   Show copies of your reports given to you.   Take all of your medications as previously prescribed.   Stay well hydrated.  Take meclizine 25 mg 1 tab every 8 hrs as needed for dizziness- caution drowsiness and do not drive nor operate heavy machinery when taking this medication.  Take Tylenol 650mg every 4-6 hours as needed for pain or fever greater than 99.9   Worsening, continued or ANY new concerning symptoms return to the emergency department.

## 2019-05-31 NOTE — ED PROVIDER NOTE - ATTENDING CONTRIBUTION TO CARE
Dr. Sam: I performed a face to face bedside interview with patient regarding history of present illness, review of symptoms and past medical history. I completed an independent physical exam.  I have discussed patient's plan of care with PA.   I agree with note as stated above, having amended the EMR as needed to reflect my findings.   This includes HISTORY OF PRESENT ILLNESS, HIV, PAST MEDICAL/SURGICAL/FAMILY/SOCIAL HISTORY, ALLERGIES AND HOME MEDICATIONS, REVIEW OF SYSTEMS, PHYSICAL EXAM, and any PROGRESS NOTES during the time I functioned as the attending physician for this patient.    see mdm

## 2019-05-31 NOTE — ED ADULT TRIAGE NOTE - CHIEF COMPLAINT QUOTE
Per family "He was discharged from [facility] yesterday after having a subdural hemorrhage. They did not do surgery. He is now complaining of headache and nausea especially when changing positions. He is also pale and clammy."

## 2019-06-01 ENCOUNTER — INPATIENT (INPATIENT)
Facility: HOSPITAL | Age: 74
LOS: 5 days | Discharge: HOME | DRG: 643 | End: 2019-06-07
Attending: HOSPITALIST | Admitting: HOSPITALIST
Payer: COMMERCIAL

## 2019-06-01 VITALS
TEMPERATURE: 98 F | HEART RATE: 65 BPM | SYSTOLIC BLOOD PRESSURE: 189 MMHG | HEIGHT: 67 IN | RESPIRATION RATE: 16 BRPM | OXYGEN SATURATION: 98 % | WEIGHT: 157.41 LBS | DIASTOLIC BLOOD PRESSURE: 77 MMHG

## 2019-06-01 DIAGNOSIS — N40.0 BENIGN PROSTATIC HYPERPLASIA WITHOUT LOWER URINARY TRACT SYMPTOMS: ICD-10-CM

## 2019-06-01 DIAGNOSIS — R41.82 ALTERED MENTAL STATUS, UNSPECIFIED: ICD-10-CM

## 2019-06-01 DIAGNOSIS — E87.1 HYPO-OSMOLALITY AND HYPONATREMIA: ICD-10-CM

## 2019-06-01 DIAGNOSIS — R55 SYNCOPE AND COLLAPSE: ICD-10-CM

## 2019-06-01 DIAGNOSIS — S06.5X9A TRAUMATIC SUBDURAL HEMORRHAGE WITH LOSS OF CONSCIOUSNESS OF UNSPECIFIED DURATION, INITIAL ENCOUNTER: ICD-10-CM

## 2019-06-01 DIAGNOSIS — D69.6 THROMBOCYTOPENIA, UNSPECIFIED: ICD-10-CM

## 2019-06-01 LAB
ALBUMIN SERPL ELPH-MCNC: 3.2 G/DL — LOW (ref 3.3–5)
ALP SERPL-CCNC: 71 U/L — SIGNIFICANT CHANGE UP (ref 40–120)
ALT FLD-CCNC: 137 U/L DA — HIGH (ref 10–45)
AMPHET UR-MCNC: NEGATIVE — SIGNIFICANT CHANGE UP
ANION GAP SERPL CALC-SCNC: 8 MMOL/L — SIGNIFICANT CHANGE UP (ref 5–17)
APPEARANCE UR: SIGNIFICANT CHANGE UP
APTT BLD: 23.8 SEC — LOW (ref 27.5–36.3)
AST SERPL-CCNC: 103 U/L — HIGH (ref 10–40)
BACTERIA # UR AUTO: ABNORMAL /HPF
BARBITURATES UR SCN-MCNC: NEGATIVE — SIGNIFICANT CHANGE UP
BENZODIAZ UR-MCNC: NEGATIVE — SIGNIFICANT CHANGE UP
BILIRUB SERPL-MCNC: 1.8 MG/DL — HIGH (ref 0.2–1.2)
BILIRUB UR-MCNC: NEGATIVE — SIGNIFICANT CHANGE UP
BUN SERPL-MCNC: 18 MG/DL — SIGNIFICANT CHANGE UP (ref 7–23)
CALCIUM SERPL-MCNC: 8.7 MG/DL — SIGNIFICANT CHANGE UP (ref 8.4–10.5)
CHLORIDE SERPL-SCNC: 96 MMOL/L — SIGNIFICANT CHANGE UP (ref 96–108)
CK SERPL-CCNC: 330 U/L — HIGH (ref 30–200)
CO2 SERPL-SCNC: 26 MMOL/L — SIGNIFICANT CHANGE UP (ref 22–31)
COCAINE METAB.OTHER UR-MCNC: NEGATIVE — SIGNIFICANT CHANGE UP
COLOR SPEC: YELLOW — SIGNIFICANT CHANGE UP
COMMENT - URINE: SIGNIFICANT CHANGE UP
CREAT SERPL-MCNC: 0.86 MG/DL — SIGNIFICANT CHANGE UP (ref 0.5–1.3)
DIFF PNL FLD: ABNORMAL
EPI CELLS # UR: SIGNIFICANT CHANGE UP
ETHANOL SERPL-MCNC: <3 MG/DL — SIGNIFICANT CHANGE UP (ref 0–3)
FLU A RESULT: SIGNIFICANT CHANGE UP
FLU A RESULT: SIGNIFICANT CHANGE UP
FLUAV AG NPH QL: SIGNIFICANT CHANGE UP
FLUBV AG NPH QL: SIGNIFICANT CHANGE UP
GLUCOSE BLDC GLUCOMTR-MCNC: 125 MG/DL — HIGH (ref 70–99)
GLUCOSE SERPL-MCNC: 128 MG/DL — HIGH (ref 70–99)
GLUCOSE UR QL: 50 MG/DL
HCT VFR BLD CALC: 30.5 % — LOW (ref 39–50)
HGB BLD-MCNC: 10.7 G/DL — LOW (ref 13–17)
INR BLD: 1.16 RATIO — SIGNIFICANT CHANGE UP (ref 0.88–1.16)
KETONES UR-MCNC: ABNORMAL
LACTATE SERPL-SCNC: 1.5 MMOL/L — SIGNIFICANT CHANGE UP (ref 0.7–2)
LEUKOCYTE ESTERASE UR-ACNC: NEGATIVE — SIGNIFICANT CHANGE UP
MCHC RBC-ENTMCNC: 29.5 PG — SIGNIFICANT CHANGE UP (ref 27–34)
MCHC RBC-ENTMCNC: 35.1 GM/DL — SIGNIFICANT CHANGE UP (ref 32–36)
MCV RBC AUTO: 84 FL — SIGNIFICANT CHANGE UP (ref 80–100)
METHADONE UR-MCNC: NEGATIVE — SIGNIFICANT CHANGE UP
NITRITE UR-MCNC: NEGATIVE — SIGNIFICANT CHANGE UP
NRBC # BLD: 0 /100 WBCS — SIGNIFICANT CHANGE UP (ref 0–0)
OPIATES UR-MCNC: NEGATIVE — SIGNIFICANT CHANGE UP
PCP SPEC-MCNC: SIGNIFICANT CHANGE UP
PCP UR-MCNC: NEGATIVE — SIGNIFICANT CHANGE UP
PH UR: 6 — SIGNIFICANT CHANGE UP (ref 5–8)
PHOSPHATE SERPL-MCNC: 3 MG/DL — SIGNIFICANT CHANGE UP (ref 2.5–4.5)
PLATELET # BLD AUTO: 122 K/UL — LOW (ref 150–400)
POTASSIUM SERPL-MCNC: 3.8 MMOL/L — SIGNIFICANT CHANGE UP (ref 3.5–5.3)
POTASSIUM SERPL-SCNC: 3.8 MMOL/L — SIGNIFICANT CHANGE UP (ref 3.5–5.3)
PROCALCITONIN SERPL-MCNC: 0.04 NG/ML — SIGNIFICANT CHANGE UP
PROT SERPL-MCNC: 6.5 G/DL — SIGNIFICANT CHANGE UP (ref 6–8.3)
PROT UR-MCNC: 15
PROTHROM AB SERPL-ACNC: 13.1 SEC — HIGH (ref 10–12.9)
RBC # BLD: 3.63 M/UL — LOW (ref 4.2–5.8)
RBC # FLD: 13.2 % — SIGNIFICANT CHANGE UP (ref 10.3–14.5)
RBC CASTS # UR COMP ASSIST: SIGNIFICANT CHANGE UP /HPF (ref 0–4)
RSV RESULT: SIGNIFICANT CHANGE UP
RSV RNA RESP QL NAA+PROBE: SIGNIFICANT CHANGE UP
SODIUM SERPL-SCNC: 130 MMOL/L — LOW (ref 135–145)
SP GR SPEC: 1.02 — SIGNIFICANT CHANGE UP (ref 1.01–1.02)
THC UR QL: NEGATIVE — SIGNIFICANT CHANGE UP
TROPONIN I SERPL-MCNC: 0.04 NG/ML — SIGNIFICANT CHANGE UP (ref 0.02–0.06)
TROPONIN I SERPL-MCNC: 0.04 NG/ML — SIGNIFICANT CHANGE UP (ref 0.02–0.06)
UROBILINOGEN FLD QL: 4
WBC # BLD: 9.21 K/UL — SIGNIFICANT CHANGE UP (ref 3.8–10.5)
WBC # FLD AUTO: 9.21 K/UL — SIGNIFICANT CHANGE UP (ref 3.8–10.5)
WBC UR QL: SIGNIFICANT CHANGE UP /HPF (ref 0–5)

## 2019-06-01 PROCEDURE — 99233 SBSQ HOSP IP/OBS HIGH 50: CPT

## 2019-06-01 PROCEDURE — 99285 EMERGENCY DEPT VISIT HI MDM: CPT

## 2019-06-01 PROCEDURE — 93010 ELECTROCARDIOGRAM REPORT: CPT

## 2019-06-01 PROCEDURE — 71045 X-RAY EXAM CHEST 1 VIEW: CPT | Mod: 26

## 2019-06-01 PROCEDURE — 70450 CT HEAD/BRAIN W/O DYE: CPT | Mod: 26

## 2019-06-01 RX ORDER — ACETAMINOPHEN 500 MG
650 TABLET ORAL ONCE
Refills: 0 | Status: COMPLETED | OUTPATIENT
Start: 2019-06-01 | End: 2019-06-01

## 2019-06-01 RX ORDER — ACETAMINOPHEN 500 MG
650 TABLET ORAL EVERY 6 HOURS
Refills: 0 | Status: DISCONTINUED | OUTPATIENT
Start: 2019-06-01 | End: 2019-06-07

## 2019-06-01 RX ORDER — CEFTRIAXONE 500 MG/1
1 INJECTION, POWDER, FOR SOLUTION INTRAMUSCULAR; INTRAVENOUS ONCE
Refills: 0 | Status: COMPLETED | OUTPATIENT
Start: 2019-06-01 | End: 2019-06-01

## 2019-06-01 RX ORDER — FINASTERIDE 5 MG/1
5 TABLET, FILM COATED ORAL DAILY
Refills: 0 | Status: DISCONTINUED | OUTPATIENT
Start: 2019-06-01 | End: 2019-06-01

## 2019-06-01 RX ORDER — SODIUM CHLORIDE 9 MG/ML
1000 INJECTION INTRAMUSCULAR; INTRAVENOUS; SUBCUTANEOUS ONCE
Refills: 0 | Status: COMPLETED | OUTPATIENT
Start: 2019-06-01 | End: 2019-06-01

## 2019-06-01 RX ORDER — FINASTERIDE 5 MG/1
5 TABLET, FILM COATED ORAL DAILY
Refills: 0 | Status: DISCONTINUED | OUTPATIENT
Start: 2019-06-01 | End: 2019-06-07

## 2019-06-01 RX ORDER — LEVETIRACETAM 250 MG/1
500 TABLET, FILM COATED ORAL
Refills: 0 | Status: DISCONTINUED | OUTPATIENT
Start: 2019-06-01 | End: 2019-06-07

## 2019-06-01 RX ORDER — TAMSULOSIN HYDROCHLORIDE 0.4 MG/1
0.4 CAPSULE ORAL AT BEDTIME
Refills: 0 | Status: DISCONTINUED | OUTPATIENT
Start: 2019-06-01 | End: 2019-06-07

## 2019-06-01 RX ORDER — FOLIC ACID 0.8 MG
1 TABLET ORAL DAILY
Refills: 0 | Status: DISCONTINUED | OUTPATIENT
Start: 2019-06-01 | End: 2019-06-07

## 2019-06-01 RX ORDER — MECLIZINE HCL 12.5 MG
25 TABLET ORAL THREE TIMES A DAY
Refills: 0 | Status: DISCONTINUED | OUTPATIENT
Start: 2019-06-01 | End: 2019-06-07

## 2019-06-01 RX ORDER — THIAMINE MONONITRATE (VIT B1) 100 MG
100 TABLET ORAL DAILY
Refills: 0 | Status: DISCONTINUED | OUTPATIENT
Start: 2019-06-01 | End: 2019-06-07

## 2019-06-01 RX ADMIN — Medication 650 MILLIGRAM(S): at 16:23

## 2019-06-01 RX ADMIN — SODIUM CHLORIDE 1000 MILLILITER(S): 9 INJECTION INTRAMUSCULAR; INTRAVENOUS; SUBCUTANEOUS at 18:38

## 2019-06-01 RX ADMIN — Medication 650 MILLIGRAM(S): at 18:26

## 2019-06-01 RX ADMIN — LEVETIRACETAM 500 MILLIGRAM(S): 250 TABLET, FILM COATED ORAL at 18:36

## 2019-06-01 RX ADMIN — Medication 25 MILLIGRAM(S): at 21:40

## 2019-06-01 RX ADMIN — Medication 650 MILLIGRAM(S): at 19:36

## 2019-06-01 RX ADMIN — SODIUM CHLORIDE 1000 MILLILITER(S): 9 INJECTION INTRAMUSCULAR; INTRAVENOUS; SUBCUTANEOUS at 16:23

## 2019-06-01 RX ADMIN — TAMSULOSIN HYDROCHLORIDE 0.4 MILLIGRAM(S): 0.4 CAPSULE ORAL at 21:40

## 2019-06-01 RX ADMIN — CEFTRIAXONE 100 GRAM(S): 500 INJECTION, POWDER, FOR SOLUTION INTRAMUSCULAR; INTRAVENOUS at 15:13

## 2019-06-01 RX ADMIN — SODIUM CHLORIDE 2000 MILLILITER(S): 9 INJECTION INTRAMUSCULAR; INTRAVENOUS; SUBCUTANEOUS at 15:13

## 2019-06-01 RX ADMIN — CEFTRIAXONE 1 GRAM(S): 500 INJECTION, POWDER, FOR SOLUTION INTRAMUSCULAR; INTRAVENOUS at 16:05

## 2019-06-01 RX ADMIN — Medication 650 MILLIGRAM(S): at 18:36

## 2019-06-01 NOTE — H&P ADULT - HISTORY OF PRESENT ILLNESS
73M PMH alcohol abuse, recent fall with bilateral SDH (nonsurgical tx) presents for fever and syncope.  Patient was discharge from [facility] on 5/30/19 after a fall at a party/alcohol use.  He was found to have bilateral SDH and evaluated by neurosurgery and neurology.  He did not undergo any surgical intervention and was started on keppra to prevent seizures. 73M PMH alcohol abuse, recent fall with bilateral SDH (nonsurgical tx) presents for fever and syncope.  Patient was discharge from [facility] on 5/30/19 after a fall at a party/alcohol use.  He was found to have bilateral SDH and evaluated by neurosurgery and neurology.  He did not undergo any surgical intervention and was started on keppra to prevent seizures.   He feels okay. Denies chest pain, sob, nausea, vomiting, diarrhea, headache.

## 2019-06-01 NOTE — H&P ADULT - ASSESSMENT
73M PMH alcohol use s/p recent fall found to have bilateral SDH, discharged home 2 days ago from [facility] presents for fever, dizziness.     IMPROVE VTE Individual Risk Assessment          RISK                                                          Points  [  ] Previous VTE                                                3  [  ] Thrombophilia                                             2  [  ] Lower limb paralysis                                   2        (unable to hold up >15 seconds)    [  ] Current Cancer                                             2         (within 6 months)  [  ] Immobilization > 24 hrs                              1  [  ] ICU/CCU stay > 24 hours                             1  [X  ] Age > 60                                                         1    IMPROVE VTE Score:         [      1   ]    Total Risk Factor Score:    0 - 1:   Consider IPC  >2 - 3:  Thromboprophylaxis required (enoxaparin or SQ heparin)        >4:   High Risk: Thromboprophylaxis required (enoxaparin or SQ heparin), optional add IPC  **If CONTRAINDICATION to enoxaparin or SQ heparin, USE IPCs**    NO DVT PPX given recent brain bleed 73M PMH alcohol use s/p recent fall found to have bilateral SDH, discharged home 2 days ago from [facility] presents for syncope, likely secondary to hyponatremia in setting of recent SDH.      IMPROVE VTE Individual Risk Assessment          RISK                                                          Points  [  ] Previous VTE                                                3  [  ] Thrombophilia                                             2  [  ] Lower limb paralysis                                   2        (unable to hold up >15 seconds)    [  ] Current Cancer                                             2         (within 6 months)  [  ] Immobilization > 24 hrs                              1  [  ] ICU/CCU stay > 24 hours                             1  [X  ] Age > 60                                                         1    IMPROVE VTE Score:         [      1   ]    Total Risk Factor Score:    0 - 1:   Consider IPC  >2 - 3:  Thromboprophylaxis required (enoxaparin or SQ heparin)        >4:   High Risk: Thromboprophylaxis required (enoxaparin or SQ heparin), optional add IPC  **If CONTRAINDICATION to enoxaparin or SQ heparin, USE IPCs**    NO DVT PPX given recent brain bleed

## 2019-06-01 NOTE — ED PROVIDER NOTE - ATTENDING CONTRIBUTION TO CARE
cc  dizziness , passed out twice  , shaking chills Hx of intra cranial bleed  pe nad . alert oriented *2, no neuro deficit, nop meningeal signs  chest cta bilaterally   temp ed 100.6 ct brain unchanged + bleed   sepsis work up , cardiac workup , abx   Dr. Carlson:  I have reviewed and discussed with the PA/ resident the case specifics, including the history, physical assessment, evaluation, conclusion, laboratory results, and medical plan. I agree with the contents, and conclusions. I have personally examined, and interviewed the patient.

## 2019-06-01 NOTE — ED PROVIDER NOTE - CLINICAL SUMMARY MEDICAL DECISION MAKING FREE TEXT BOX
72 y/o M with recent diagnosis May 28th of b/l subdural hemorrhage secondary to ETOH and transfer to [facility]. discharged 30th with keppra 500mg bid. was seen yesterday in ED for headache and dizziness. Ct was stable. today as per pts family pt was confused and had 2 near syncopal episodes upon standing. pt found to have rectal temp in ED. sepsis workup in ED. pt found to be hyponatremia with elevated LFTs

## 2019-06-01 NOTE — H&P ADULT - NSHPPHYSICALEXAM_GEN_ALL_CORE
T(C): 38.1 (06-01-19 @ 14:42), Max: 38.1 (06-01-19 @ 14:42)  HR: 70 (06-01-19 @ 15:45) (56 - 70)  BP: 176/69 (06-01-19 @ 15:45) (172/63 - 189/77)  RR: 20 (06-01-19 @ 15:45) (16 - 20)  SpO2: 98% (06-01-19 @ 15:45) (98% - 98%)  Wt(kg): --Vital Signs Last 24 Hrs  T(C): 38.1 (01 Jun 2019 14:42), Max: 38.1 (01 Jun 2019 14:42)  T(F): 100.6 (01 Jun 2019 14:42), Max: 100.6 (01 Jun 2019 14:42)  HR: 70 (01 Jun 2019 15:45) (56 - 70)  BP: 176/69 (01 Jun 2019 15:45) (172/63 - 189/77)  BP(mean): --  RR: 20 (01 Jun 2019 15:45) (16 - 20)  SpO2: 98% (01 Jun 2019 15:45) (98% - 98%)    PHYSICAL EXAM:  GENERAL: NAD, well-groomed, well-developed  HEAD:  Atraumatic, Normocephalic  EYES: EOMI, PERRLA, conjunctiva and sclera clear  ENMT: No tonsillar erythema, exudates, or enlargement; Moist mucous membranes, Good dentition, No lesions  NECK: Supple, No JVD, Normal thyroid  NERVOUS SYSTEM:  Alert & Oriented X3, Good concentration; Motor Strength 5/5 B/L upper and lower extremities; DTRs 2+ intact and symmetric  CHEST/LUNG: Clear to percussion bilaterally; No rales, rhonchi, wheezing, or rubs  HEART: Regular rate and rhythm; No murmurs, rubs, or gallops  ABDOMEN: Soft, Nontender, Nondistended; Bowel sounds present  EXTREMITIES:  2+ Peripheral Pulses, No clubbing, cyanosis, or edema  LYMPH: No lymphadenopathy noted  SKIN: No rashes or lesions

## 2019-06-01 NOTE — ED ADULT NURSE NOTE - OBJECTIVE STATEMENT
pt bib family for increased confusion and unsteady gait. pt had a unwitnessed fall 1 week ago and was diagnosed with a subdural bleed. pt was d/ce'd from hospital 2 days ago and returned to ER yesterday for dizziness. daughter reports pt was also shaking. denies alleviating or aggravating factors. pt denies chest pain, sob or pain.

## 2019-06-01 NOTE — H&P ADULT - NSHPLABSRESULTS_GEN_ALL_CORE
LABS:                        10.7   9.21  )-----------( 122      ( 2019 15:09 )             30.5     06-    130<L>  |  96  |  18  ----------------------------<  128<H>  3.8   |  26  |  0.86    Ca    8.7      2019 15:09  Phos  3.0     -    TPro  6.5  /  Alb  3.2<L>  /  TBili  1.8<H>  /  DBili  x   /  AST  103<H>  /  ALT  137<H>  /  AlkPhos  71  06-    PT/INR - ( 2019 15:09 )   PT: 13.1 sec;   INR: 1.16 ratio         PTT - ( 2019 15:09 )  PTT:23.8 sec  Urinalysis Basic - ( 2019 15:25 )    Color: Yellow / Appearance: slightly cloudy / S.020 / pH: x  Gluc: x / Ketone: Trace  / Bili: Negative / Urobili: 4   Blood: x / Protein: 15 / Nitrite: Negative   Leuk Esterase: Negative / RBC: 0-4 /HPF / WBC 3-5 /HPF   Sq Epi: x / Non Sq Epi: Neg.-Few / Bacteria: Trace /HPF    CAPILLARY BLOOD GLUCOSE    POCT Blood Glucose.: 125 mg/dL (2019 14:18)    Urinalysis Basic - ( 2019 15:25 )    Color: Yellow / Appearance: slightly cloudy / S.020 / pH: x  Gluc: x / Ketone: Trace  / Bili: Negative / Urobili: 4   Blood: x / Protein: 15 / Nitrite: Negative   Leuk Esterase: Negative / RBC: 0-4 /HPF / WBC 3-5 /HPF   Sq Epi: x / Non Sq Epi: Neg.-Few / Bacteria: Trace /HPF    RADIOLOGY & ADDITIONAL TESTS:    Consultant(s) Notes Reviewed:  [x ] YES  [ ] NO  Care Discussed with Consultants/Other Providers [ x] YES  [ ] NO  Imaging Personally Reviewed:  [ ] YES  [ ] NO

## 2019-06-01 NOTE — ED ADULT NURSE NOTE - NSIMPLEMENTINTERV_GEN_ALL_ED
Implemented All Universal Safety Interventions:  [place] to call system. Call bell, personal items and telephone within reach. Instruct patient to call for assistance. Room bathroom lighting operational. Non-slip footwear when patient is off stretcher. Physically safe environment: no spills, clutter or unnecessary equipment. Stretcher in lowest position, wheels locked, appropriate side rails in place.

## 2019-06-01 NOTE — ED ADULT TRIAGE NOTE - CHIEF COMPLAINT QUOTE
"We were here yesterday, he gets dizzy and wobbly when he stands up. We were prescribed meclizine here yesterday. He took one today at 12:30"

## 2019-06-01 NOTE — ED PROVIDER NOTE - OBJECTIVE STATEMENT
72 y/o M with recent diagnosis May 28th of b/l subdural hemorrhage secondary to ETOH and transfer to [facility]. discharged 30th with keppra 500mg bid. was seen yesterday in ED for headache and dizziness. Ct was stable. today as per pts family pt was confused and had 2 near syncopal episodes upon standing. pts family states he has been "shaking" today

## 2019-06-01 NOTE — H&P ADULT - PROBLEM SELECTOR PLAN 1
unclear etiology  no signs of infection - pending flu/RVP  suspect secondary to hyponatremia in the setting of a recent head injury

## 2019-06-02 LAB
ALBUMIN SERPL ELPH-MCNC: 3.2 G/DL — LOW (ref 3.3–5)
ALP SERPL-CCNC: 71 U/L — SIGNIFICANT CHANGE UP (ref 40–120)
ALT FLD-CCNC: 126 U/L DA — HIGH (ref 10–45)
ANION GAP SERPL CALC-SCNC: 9 MMOL/L — SIGNIFICANT CHANGE UP (ref 5–17)
AST SERPL-CCNC: 55 U/L — HIGH (ref 10–40)
BILIRUB DIRECT SERPL-MCNC: 0.3 MG/DL — HIGH (ref 0–0.2)
BILIRUB INDIRECT FLD-MCNC: 1.2 MG/DL — HIGH (ref 0.2–1)
BILIRUB SERPL-MCNC: 1.5 MG/DL — HIGH (ref 0.2–1.2)
BUN SERPL-MCNC: 16 MG/DL — SIGNIFICANT CHANGE UP (ref 7–23)
CALCIUM SERPL-MCNC: 8.7 MG/DL — SIGNIFICANT CHANGE UP (ref 8.4–10.5)
CHLORIDE SERPL-SCNC: 100 MMOL/L — SIGNIFICANT CHANGE UP (ref 96–108)
CO2 SERPL-SCNC: 28 MMOL/L — SIGNIFICANT CHANGE UP (ref 22–31)
CREAT SERPL-MCNC: 1.05 MG/DL — SIGNIFICANT CHANGE UP (ref 0.5–1.3)
CULTURE RESULTS: SIGNIFICANT CHANGE UP
GLUCOSE SERPL-MCNC: 140 MG/DL — HIGH (ref 70–99)
LEGIONELLA AG UR QL: NEGATIVE — SIGNIFICANT CHANGE UP
POTASSIUM SERPL-MCNC: 3.4 MMOL/L — LOW (ref 3.5–5.3)
POTASSIUM SERPL-SCNC: 3.4 MMOL/L — LOW (ref 3.5–5.3)
PROT SERPL-MCNC: 6.5 G/DL — SIGNIFICANT CHANGE UP (ref 6–8.3)
RAPID RVP RESULT: SIGNIFICANT CHANGE UP
SODIUM SERPL-SCNC: 137 MMOL/L — SIGNIFICANT CHANGE UP (ref 135–145)
SPECIMEN SOURCE: SIGNIFICANT CHANGE UP

## 2019-06-02 PROCEDURE — 93306 TTE W/DOPPLER COMPLETE: CPT | Mod: 26

## 2019-06-02 PROCEDURE — 99233 SBSQ HOSP IP/OBS HIGH 50: CPT

## 2019-06-02 RX ORDER — POTASSIUM CHLORIDE 20 MEQ
40 PACKET (EA) ORAL ONCE
Refills: 0 | Status: COMPLETED | OUTPATIENT
Start: 2019-06-02 | End: 2019-06-02

## 2019-06-02 RX ORDER — HYDRALAZINE HCL 50 MG
10 TABLET ORAL ONCE
Refills: 0 | Status: COMPLETED | OUTPATIENT
Start: 2019-06-02 | End: 2019-06-02

## 2019-06-02 RX ORDER — AMLODIPINE BESYLATE 2.5 MG/1
5 TABLET ORAL DAILY
Refills: 0 | Status: DISCONTINUED | OUTPATIENT
Start: 2019-06-02 | End: 2019-06-07

## 2019-06-02 RX ADMIN — Medication 40 MILLIEQUIVALENT(S): at 11:24

## 2019-06-02 RX ADMIN — Medication 25 MILLIGRAM(S): at 21:11

## 2019-06-02 RX ADMIN — Medication 650 MILLIGRAM(S): at 04:30

## 2019-06-02 RX ADMIN — Medication 650 MILLIGRAM(S): at 20:09

## 2019-06-02 RX ADMIN — Medication 25 MILLIGRAM(S): at 15:14

## 2019-06-02 RX ADMIN — Medication 650 MILLIGRAM(S): at 11:57

## 2019-06-02 RX ADMIN — Medication 25 MILLIGRAM(S): at 05:34

## 2019-06-02 RX ADMIN — LEVETIRACETAM 500 MILLIGRAM(S): 250 TABLET, FILM COATED ORAL at 05:34

## 2019-06-02 RX ADMIN — Medication 650 MILLIGRAM(S): at 03:59

## 2019-06-02 RX ADMIN — FINASTERIDE 5 MILLIGRAM(S): 5 TABLET, FILM COATED ORAL at 11:25

## 2019-06-02 RX ADMIN — AMLODIPINE BESYLATE 5 MILLIGRAM(S): 2.5 TABLET ORAL at 15:14

## 2019-06-02 RX ADMIN — Medication 1 MILLIGRAM(S): at 11:25

## 2019-06-02 RX ADMIN — TAMSULOSIN HYDROCHLORIDE 0.4 MILLIGRAM(S): 0.4 CAPSULE ORAL at 21:11

## 2019-06-02 RX ADMIN — Medication 100 MILLIGRAM(S): at 11:25

## 2019-06-02 RX ADMIN — Medication 650 MILLIGRAM(S): at 21:05

## 2019-06-02 RX ADMIN — Medication 10 MILLIGRAM(S): at 21:11

## 2019-06-02 RX ADMIN — LEVETIRACETAM 500 MILLIGRAM(S): 250 TABLET, FILM COATED ORAL at 17:42

## 2019-06-02 NOTE — PROGRESS NOTE ADULT - SUBJECTIVE AND OBJECTIVE BOX
Patient is a 73y old  Male who presents with a chief complaint of fever, falls (2019 17:27)    Feels okay. Better than yesterday. Feels tired because he didn't get a good nights rest.     Patient seen and examined at bedside.    ALLERGIES:  Allergy Status Unknown  No Known Allergies    MEDICATIONS  (STANDING):  finasteride 5 milliGRAM(s) Oral daily  folic acid 1 milliGRAM(s) Oral daily  levETIRAcetam 500 milliGRAM(s) Oral two times a day  meclizine 25 milliGRAM(s) Oral three times a day  tamsulosin 0.4 milliGRAM(s) Oral at bedtime  thiamine 100 milliGRAM(s) Oral daily    MEDICATIONS  (PRN):  acetaminophen   Tablet .. 650 milliGRAM(s) Oral every 6 hours PRN Temp greater or equal to 38C (100.4F), Mild Pain (1 - 3)    Vital Signs Last 24 Hrs  T(F): 98.2 (2019 08:39), Max: 101.4 (2019 18:25)  HR: 63 (2019 08:39) (63 - 89)  BP: 156/69 (2019 08:39) (132/61 - 189/77)  RR: 16 (2019 08:39) (16 - 21)  SpO2: 99% (2019 08:39) (97% - 100%)  I&O's Summary    2019 07:01  -  2019 07:00  --------------------------------------------------------  IN: 100 mL / OUT: 752 mL / NET: -652 mL    BMI (kg/m2): 25.9 (19 @ 20:30), 23.9 (19 @ 18:05)  PHYSICAL EXAM:  General: NAD, A/O x 3  ENT: MMM  Neck: Supple, No JVD  Lungs: Clear to auscultation bilaterally  Cardio: RRR, S1/S2, No murmurs  Abdomen: Soft, Nontender, Nondistended; Bowel sounds present  Extremities: No calf tenderness, No pitting edema    LABS:                        10.7   9.21  )-----------( 122      ( 2019 15:09 )             30.5           130  |  96  |  18  ----------------------------<  128  3.8   |  26  |  0.86    Ca    8.7      2019 15:09  Phos  3.0         TPro  6.5  /  Alb  3.2  /  TBili  1.8  /  DBili  x   /  AST  103  /  ALT  137  /  AlkPhos  71       eGFR if Non African American: 87 mL/min/1.73M2 (19 @ 15:09)  eGFR if : 101 mL/min/1.73M2 (19 @ 15:09)    PT/INR - ( 2019 15:09 )   PT: 13.1 sec;   INR: 1.16 ratio         PTT - ( 2019 15:09 )  PTT:23.8 sec   Lactate, Blood: 1.5 mmol/L ( @ 15:25)    CARDIAC MARKERS ( 2019 21:51 )  .038 ng/mL / x     / x     / x     / x      CARDIAC MARKERS ( 2019 15:09 )  .045 ng/mL / x     / x     / x     / x      CARDIAC MARKERS ( 2019 15:09 )  x     / x     / 330 U/L / x     / x      CARDIAC MARKERS ( 31 May 2019 18:30 )  .022 ng/mL / x     / x     / x     / x        CAPILLARY BLOOD GLUCOSE    POCT Blood Glucose.: 125 mg/dL (2019 14:18)    Urinalysis Basic - ( 2019 15:25 )    Color: Yellow / Appearance: slightly cloudy / S.020 / pH: x  Gluc: x / Ketone: Trace  / Bili: Negative / Urobili: 4   Blood: x / Protein: 15 / Nitrite: Negative   Leuk Esterase: Negative / RBC: 0-4 /HPF / WBC 3-5 /HPF   Sq Epi: x / Non Sq Epi: Neg.-Few / Bacteria: Trace /HPF    RADIOLOGY & ADDITIONAL TESTS:    Care Discussed with Consultants/Other Providers:

## 2019-06-02 NOTE — PROGRESS NOTE ADULT - ASSESSMENT
73M PMH alcohol use s/p recent fall found to have bilateral SDH, discharged home 2 days ago from [facility] presents for syncope, likely secondary to hyponatremia in setting of recent SDH.

## 2019-06-03 DIAGNOSIS — G47.00 INSOMNIA, UNSPECIFIED: ICD-10-CM

## 2019-06-03 LAB
ANION GAP SERPL CALC-SCNC: 10 MMOL/L — SIGNIFICANT CHANGE UP (ref 5–17)
ANION GAP SERPL CALC-SCNC: 9 MMOL/L — SIGNIFICANT CHANGE UP (ref 5–17)
ANION GAP SERPL CALC-SCNC: 9 MMOL/L — SIGNIFICANT CHANGE UP (ref 5–17)
BUN SERPL-MCNC: 15 MG/DL — SIGNIFICANT CHANGE UP (ref 7–23)
BUN SERPL-MCNC: 16 MG/DL — SIGNIFICANT CHANGE UP (ref 7–23)
BUN SERPL-MCNC: 21 MG/DL — SIGNIFICANT CHANGE UP (ref 7–23)
CALCIUM SERPL-MCNC: 8.7 MG/DL — SIGNIFICANT CHANGE UP (ref 8.4–10.5)
CALCIUM SERPL-MCNC: 8.7 MG/DL — SIGNIFICANT CHANGE UP (ref 8.4–10.5)
CALCIUM SERPL-MCNC: 9.1 MG/DL — SIGNIFICANT CHANGE UP (ref 8.4–10.5)
CHLORIDE SERPL-SCNC: 91 MMOL/L — LOW (ref 96–108)
CHLORIDE SERPL-SCNC: 91 MMOL/L — LOW (ref 96–108)
CHLORIDE SERPL-SCNC: 92 MMOL/L — LOW (ref 96–108)
CO2 SERPL-SCNC: 25 MMOL/L — SIGNIFICANT CHANGE UP (ref 22–31)
CO2 SERPL-SCNC: 26 MMOL/L — SIGNIFICANT CHANGE UP (ref 22–31)
CO2 SERPL-SCNC: 27 MMOL/L — SIGNIFICANT CHANGE UP (ref 22–31)
CREAT SERPL-MCNC: 0.8 MG/DL — SIGNIFICANT CHANGE UP (ref 0.5–1.3)
CREAT SERPL-MCNC: 0.91 MG/DL — SIGNIFICANT CHANGE UP (ref 0.5–1.3)
CREAT SERPL-MCNC: 0.92 MG/DL — SIGNIFICANT CHANGE UP (ref 0.5–1.3)
GLUCOSE SERPL-MCNC: 126 MG/DL — HIGH (ref 70–99)
GLUCOSE SERPL-MCNC: 127 MG/DL — HIGH (ref 70–99)
GLUCOSE SERPL-MCNC: 189 MG/DL — HIGH (ref 70–99)
OSMOLALITY UR: 474 MOSM/KG — SIGNIFICANT CHANGE UP (ref 50–1200)
POTASSIUM SERPL-MCNC: 3.6 MMOL/L — SIGNIFICANT CHANGE UP (ref 3.5–5.3)
POTASSIUM SERPL-MCNC: 3.6 MMOL/L — SIGNIFICANT CHANGE UP (ref 3.5–5.3)
POTASSIUM SERPL-MCNC: 4.1 MMOL/L — SIGNIFICANT CHANGE UP (ref 3.5–5.3)
POTASSIUM SERPL-SCNC: 3.6 MMOL/L — SIGNIFICANT CHANGE UP (ref 3.5–5.3)
POTASSIUM SERPL-SCNC: 3.6 MMOL/L — SIGNIFICANT CHANGE UP (ref 3.5–5.3)
POTASSIUM SERPL-SCNC: 4.1 MMOL/L — SIGNIFICANT CHANGE UP (ref 3.5–5.3)
SODIUM SERPL-SCNC: 126 MMOL/L — LOW (ref 135–145)
SODIUM SERPL-SCNC: 127 MMOL/L — LOW (ref 135–145)
SODIUM SERPL-SCNC: 127 MMOL/L — LOW (ref 135–145)

## 2019-06-03 PROCEDURE — 99233 SBSQ HOSP IP/OBS HIGH 50: CPT

## 2019-06-03 RX ORDER — SODIUM CHLORIDE 9 MG/ML
3 INJECTION INTRAMUSCULAR; INTRAVENOUS; SUBCUTANEOUS EVERY 6 HOURS
Refills: 0 | Status: DISCONTINUED | OUTPATIENT
Start: 2019-06-03 | End: 2019-06-03

## 2019-06-03 RX ORDER — ZOLPIDEM TARTRATE 10 MG/1
5 TABLET ORAL AT BEDTIME
Refills: 0 | Status: DISCONTINUED | OUTPATIENT
Start: 2019-06-03 | End: 2019-06-04

## 2019-06-03 RX ORDER — LANOLIN ALCOHOL/MO/W.PET/CERES
3 CREAM (GRAM) TOPICAL AT BEDTIME
Refills: 0 | Status: DISCONTINUED | OUTPATIENT
Start: 2019-06-03 | End: 2019-06-04

## 2019-06-03 RX ORDER — LANOLIN ALCOHOL/MO/W.PET/CERES
5 CREAM (GRAM) TOPICAL AT BEDTIME
Refills: 0 | Status: DISCONTINUED | OUTPATIENT
Start: 2019-06-03 | End: 2019-06-03

## 2019-06-03 RX ORDER — SODIUM CHLORIDE 9 MG/ML
1 INJECTION INTRAMUSCULAR; INTRAVENOUS; SUBCUTANEOUS EVERY 6 HOURS
Refills: 0 | Status: DISCONTINUED | OUTPATIENT
Start: 2019-06-03 | End: 2019-06-04

## 2019-06-03 RX ORDER — LANOLIN ALCOHOL/MO/W.PET/CERES
3 CREAM (GRAM) TOPICAL ONCE
Refills: 0 | Status: DISCONTINUED | OUTPATIENT
Start: 2019-06-03 | End: 2019-06-03

## 2019-06-03 RX ADMIN — Medication 650 MILLIGRAM(S): at 02:50

## 2019-06-03 RX ADMIN — Medication 650 MILLIGRAM(S): at 12:45

## 2019-06-03 RX ADMIN — Medication 100 MILLIGRAM(S): at 11:43

## 2019-06-03 RX ADMIN — SODIUM CHLORIDE 1 GRAM(S): 9 INJECTION INTRAMUSCULAR; INTRAVENOUS; SUBCUTANEOUS at 23:20

## 2019-06-03 RX ADMIN — Medication 25 MILLIGRAM(S): at 05:01

## 2019-06-03 RX ADMIN — Medication 1 MILLIGRAM(S): at 11:43

## 2019-06-03 RX ADMIN — FINASTERIDE 5 MILLIGRAM(S): 5 TABLET, FILM COATED ORAL at 11:43

## 2019-06-03 RX ADMIN — Medication 650 MILLIGRAM(S): at 23:17

## 2019-06-03 RX ADMIN — Medication 650 MILLIGRAM(S): at 12:48

## 2019-06-03 RX ADMIN — SODIUM CHLORIDE 1 GRAM(S): 9 INJECTION INTRAMUSCULAR; INTRAVENOUS; SUBCUTANEOUS at 17:37

## 2019-06-03 RX ADMIN — AMLODIPINE BESYLATE 5 MILLIGRAM(S): 2.5 TABLET ORAL at 05:01

## 2019-06-03 RX ADMIN — TAMSULOSIN HYDROCHLORIDE 0.4 MILLIGRAM(S): 0.4 CAPSULE ORAL at 21:27

## 2019-06-03 RX ADMIN — Medication 3 MILLIGRAM(S): at 21:27

## 2019-06-03 RX ADMIN — ZOLPIDEM TARTRATE 5 MILLIGRAM(S): 10 TABLET ORAL at 23:17

## 2019-06-03 RX ADMIN — SODIUM CHLORIDE 1 GRAM(S): 9 INJECTION INTRAMUSCULAR; INTRAVENOUS; SUBCUTANEOUS at 11:43

## 2019-06-03 RX ADMIN — Medication 650 MILLIGRAM(S): at 03:50

## 2019-06-03 RX ADMIN — Medication 25 MILLIGRAM(S): at 21:28

## 2019-06-03 RX ADMIN — LEVETIRACETAM 500 MILLIGRAM(S): 250 TABLET, FILM COATED ORAL at 17:37

## 2019-06-03 RX ADMIN — Medication 25 MILLIGRAM(S): at 17:37

## 2019-06-03 RX ADMIN — LEVETIRACETAM 500 MILLIGRAM(S): 250 TABLET, FILM COATED ORAL at 05:02

## 2019-06-03 NOTE — PROVIDER CONTACT NOTE (OTHER) - SITUATION
Patient complained of difficulty sleeping at night, now getting anxious and wanted med to help him sleep.

## 2019-06-03 NOTE — PROGRESS NOTE ADULT - ASSESSMENT
73M PMH alcohol use s/p recent fall found to have bilateral SDH, discharged home 3 days ago from [facility] presents for syncope, likely secondary to hyponatremia in setting of recent SDH. Sodium downtrending-126 today

## 2019-06-03 NOTE — PROGRESS NOTE ADULT - SUBJECTIVE AND OBJECTIVE BOX
Patient is a 73y old  Male who presents with a chief complaint of fever, falls (2019 10:07).  Reports he was up all night because the bed was uncomfortable. Reports he drinks a lot of water as he feels it is going to improve his "neurologic" condition      Patient seen and examined at bedside.    ALLERGIES:  Allergy Status Unknown  No Known Allergies    MEDICATIONS  (STANDING):  amLODIPine   Tablet 5 milliGRAM(s) Oral daily  finasteride 5 milliGRAM(s) Oral daily  folic acid 1 milliGRAM(s) Oral daily  levETIRAcetam 500 milliGRAM(s) Oral two times a day  meclizine 25 milliGRAM(s) Oral three times a day  melatonin 5 milliGRAM(s) Oral at bedtime  sodium chloride 1 Gram(s) Oral every 6 hours  tamsulosin 0.4 milliGRAM(s) Oral at bedtime  thiamine 100 milliGRAM(s) Oral daily    MEDICATIONS  (PRN):  acetaminophen   Tablet .. 650 milliGRAM(s) Oral every 6 hours PRN Temp greater or equal to 38C (100.4F), Mild Pain (1 - 3)  zolpidem 5 milliGRAM(s) Oral at bedtime PRN Insomnia    Vital Signs Last 24 Hrs  T(F): 97.8 (2019 04:58), Max: 99.1 (2019 12:30)  HR: 64 (2019 04:58) (63 - 69)  BP: 152/78 (2019 04:58) (152/78 - 175/68)  RR: 18 (2019 04:58) (16 - 18)  SpO2: 95% (2019 04:58) (95% - 98%)  I&O's Summary    2019 07:01  -  2019 07:00  --------------------------------------------------------  IN: 360 mL / OUT: 800 mL / NET: -440 mL      BMI (kg/m2): 25.9 (19 @ 20:30), 23.9 (19 @ 18:05)  PHYSICAL EXAM:  General: NAD, A/O x 3  ENT: MMM  Neck: Supple, No JVD  Lungs: Clear to auscultation bilaterally  Cardio: RRR, S1/S2,   Abdomen: Soft, Nontender, Nondistended; Bowel sounds present  Extremities: No calf tenderness, No pitting edema    LABS:                        10.7   9.21  )-----------( 122      ( 2019 15:09 )             30.5           126  |  91  |  16  ----------------------------<  189  3.6   |  26  |  0.91    Ca    8.7      2019 09:30  Phos  3.0         TPro  6.5  /  Alb  3.2  /  TBili  1.5  /  DBili  0.3  /  AST  55  /  ALT  126  /  AlkPhos  71       eGFR if Non African American: 85 mL/min/1.73M2 (19 @ 09:30)  eGFR if : 98 mL/min/1.73M2 (19 @ 09:30)    PT/INR - ( 2019 15:09 )   PT: 13.1 sec;   INR: 1.16 ratio         PTT - ( 2019 15:09 )  PTT:23.8 sec   Lactate, Blood: 1.5 mmol/L ( @ 15:25)    CARDIAC MARKERS ( 2019 21:51 )  .038 ng/mL / x     / x     / x     / x      CARDIAC MARKERS ( 2019 15:09 )  .045 ng/mL / x     / x     / x     / x      CARDIAC MARKERS ( 2019 15:09 )  x     / x     / 330 U/L / x     / x      CARDIAC MARKERS ( 31 May 2019 18:30 )  .022 ng/mL / x     / x     / x     / x                  CAPILLARY BLOOD GLUCOSE          Urinalysis Basic - ( 2019 15:25 )    Color: Yellow / Appearance: slightly cloudy / S.020 / pH: x  Gluc: x / Ketone: Trace  / Bili: Negative / Urobili: 4   Blood: x / Protein: 15 / Nitrite: Negative   Leuk Esterase: Negative / RBC: 0-4 /HPF / WBC 3-5 /HPF   Sq Epi: x / Non Sq Epi: Neg.-Few / Bacteria: Trace /HPF        Culture - Blood (collected 2019 15:25)  Source: .Blood Blood-Venous  Preliminary Report (2019 02:01):    No growth to date.    Culture - Blood (collected 2019 15:25)  Source: .Blood Blood-Peripheral  Preliminary Report (2019 02:01):    No growth to date.    Culture - Urine (collected 2019 15:25)  Source: .Urine Clean Catch (Midstream)  Final Report (2019 18:23):    <10,000 CFU/mL Normal Urogenital Judy        RADIOLOGY & ADDITIONAL TESTS:    Care Discussed with Consultants/Other Providers:

## 2019-06-03 NOTE — PROGRESS NOTE ADULT - PROBLEM SELECTOR PLAN 2
na 126 this AM  Suspect due to recent SDH  Fluid restrict  start salt tabs  Appreciate renal consult-Dr Sanchez na 126 this AM  Suspect due to recent SDH  Fluid restrict  start salt tabs  Appreciate renal consult-Dr Sanchez  Obtain 5 PM BMP

## 2019-06-03 NOTE — PROGRESS NOTE ADULT - PROBLEM SELECTOR PLAN 1
suspect secondary to hyponatremia and recent SDH  CT head with no changes  Monitored on tele w no acute events  Trops negative

## 2019-06-03 NOTE — PROGRESS NOTE ADULT - PROBLEM SELECTOR PLAN 5
likely secondary to chronic alcohol use?  will cont to monitor    DVT ppx;OOB likely secondary to chronic alcohol use?  will cont to monitor

## 2019-06-03 NOTE — CONSULT NOTE ADULT - SUBJECTIVE AND OBJECTIVE BOX
NEPHROLOGY CONSULTATION    CHIEF COMPLAINT: fever and syncope    HPI:  Pt is 74 yo M w/PMH of alcohol abuse, recent fall with bilateral SDH (nonsurgical tx), adm at [facility]. d/c-d from [facility] . He was started on keppra at [facility] to prevent seizures. On  he presented to [facility] w/fever and syncope. Denies chest pain, sob, nausea, vomiting, diarrhea, headache. Asked to eval for hyponatremia.    ROS:  as above    Allergies:  Allergy Status Unknown  No Known Allergies    PAST MEDICAL & SURGICAL HISTORY:  ICH (intracerebral hemorrhage)  BPH (benign prostatic hyperplasia)  HTN  No significant past surgical history    SOCIAL HISTORY:  ETOH    FAMILY HISTORY:  NC    MEDICATIONS  (STANDING):  amLODIPine   Tablet 5 milliGRAM(s) Oral daily  finasteride 5 milliGRAM(s) Oral daily  folic acid 1 milliGRAM(s) Oral daily  levETIRAcetam 500 milliGRAM(s) Oral two times a day  meclizine 25 milliGRAM(s) Oral three times a day  melatonin 3 milliGRAM(s) Oral at bedtime  sodium chloride 1 Gram(s) Oral every 6 hours  tamsulosin 0.4 milliGRAM(s) Oral at bedtime  thiamine 100 milliGRAM(s) Oral daily    Home Medications:  acetaminophen 325 mg oral tablet: 2 tab(s) orally every 6 hours, As needed, Mild Pain (1 - 3) (30 May 2019 15:49)  finasteride 1 mg oral tablet: 1 tab(s) orally once a day (28 May 2019 00:52)  Flomax 0.4 mg oral capsule: 1 cap(s) orally once a day (28 May 2019 00:52)    Vital Signs Last 24 Hrs  T(C): 36.6 (19 @ 04:58), Max: 37 (19 @ 16:59)  T(F): 97.8 (19 @ 04:58), Max: 98.6 (19 @ 16:59)  HR: 64 (19 @ 04:58) (64 - 69)  BP: 152/78 (19 @ 04:58) (152/78 - 175/68)  RR: 18 (19 @ 04:58) (16 - 18)  SpO2: 95% (19 @ 04:58) (95% - 95%)    Conversant, no apparent distress  PERRLA, pink conjunctivae, no ptosis  Good dentition, no pharyngeal erythema  Neck non tender, no mass, no thyromegaly or nodules  Normal respiratory effort, lungs clear to auscultation  Heart with RRR, no murmurs or rubs, no peripheral edema  Abdomen soft, no masses, no organomegaly  Skin no rashes, ulcers or lesions, normal turgor and temperature  Appropriate affect, AO x 3    LABS:        126<L>  |  91<L>  |  16  ----------------------------<  189<H>  3.6   |  26  |  0.91    Ca    8.7      2019 09:30    TPro  6.5  /  Alb  3.2<L>  /  TBili  1.5<H>  /  DBili  0.3<H>  /  AST  55<H>  /  ALT  126<H>  /  AlkPhos  71  06-02    Urinalysis Basic - ( 2019 15:25 )    Color: Yellow / Appearance: slightly cloudy / S.020 / pH: x  Gluc: x / Ketone: Trace  / Bili: Negative / Urobili: 4   Blood: x / Protein: 15 / Nitrite: Negative   Leuk Esterase: Negative / RBC: 0-4 /HPF / WBC 3-5 /HPF   Sq Epi: x / Non Sq Epi: Neg.-Few / Bacteria: Trace /HPF    LIVER FUNCTIONS - ( 2019 09:50 )  Alb: 3.2 g/dL / Pro: 6.5 g/dL / ALK PHOS: 71 U/L / ALT: 126 U/L DA / AST: 55 U/L / GGT: x           Culture - Blood (collected 2019 15:25)  Source: .Blood Blood-Venous  Preliminary Report (2019 02:01):    No growth to date.    Culture - Blood (collected 2019 15:25)  Source: .Blood Blood-Peripheral  Preliminary Report (2019 02:01):    No growth to date.    Culture - Urine (collected 2019 15:25)  Source: .Urine Clean Catch (Midstream)  Final Report (2019 18:23):    <10,000 CFU/mL Normal Urogenital Judy    A/P: NEPHROLOGY CONSULTATION    CHIEF COMPLAINT: syncope    HPI:  Pt is 74 yo M w/PMH of alcohol abuse, recent fall with bilateral SDH/SAH (nonsurgical tx), was at [facility] -. Was started on keppra at [facility] to prevent seizures. On  he presented to [facility] ER s/p blacked out for about a minute upon standing up from chair. D/c-d after observation. Had 2 more similar episodes on  w/AMS, came back to [facility]. Denies chest pain, sob, nausea, vomiting, diarrhea, headache. Asked to eval for hyponatremia.    ROS:  as above    Allergies:  Allergy Status Unknown  No Known Allergies    PAST MEDICAL & SURGICAL HISTORY:  ICH (intracerebral hemorrhage)  BPH (benign prostatic hyperplasia)  HTN  No significant past surgical history    SOCIAL HISTORY:  ETOH    FAMILY HISTORY:  NC    MEDICATIONS  (STANDING):  amLODIPine   Tablet 5 milliGRAM(s) Oral daily  finasteride 5 milliGRAM(s) Oral daily  folic acid 1 milliGRAM(s) Oral daily  levETIRAcetam 500 milliGRAM(s) Oral two times a day  meclizine 25 milliGRAM(s) Oral three times a day  melatonin 3 milliGRAM(s) Oral at bedtime  sodium chloride 1 Gram(s) Oral every 6 hours  tamsulosin 0.4 milliGRAM(s) Oral at bedtime  thiamine 100 milliGRAM(s) Oral daily    Home Medications:  acetaminophen 325 mg oral tablet: 2 tab(s) orally every 6 hours, As needed, Mild Pain (1 - 3) (30 May 2019 15:49)  finasteride 1 mg oral tablet: 1 tab(s) orally once a day (28 May 2019 00:52)  Flomax 0.4 mg oral capsule: 1 cap(s) orally once a day (28 May 2019 00:52)    Vital Signs Last 24 Hrs  T(C): 36.6 (19 @ 04:58), Max: 37 (19 @ 16:59)  T(F): 97.8 (19 @ 04:58), Max: 98.6 (19 @ 16:59)  HR: 64 (19 @ 04:58) (64 - 69)  BP: 152/78 (19 @ 04:58) (152/78 - 175/68)  RR: 18 (19 @ 04:58) (16 - 18)  SpO2: 95% (19 @ 04:58) (95% - 95%)    Conversant, no apparent distress  PERRLA, pink conjunctivae, no ptosis  Good dentition, no pharyngeal erythema  Neck non tender, no mass, no thyromegaly or nodules  Normal respiratory effort, lungs clear to auscultation  Heart with RRR, no murmurs or rubs, no peripheral edema  Abdomen soft, no masses, no organomegaly  Skin no rashes, ulcers or lesions, normal turgor and temperature  Appropriate affect, AO x 3    LABS:        126<L>  |  91<L>  |  16  ----------------------------<  189<H>  3.6   |  26  |  0.91    Ca    8.7      2019 09:30    TPro  6.5  /  Alb  3.2<L>  /  TBili  1.5<H>  /  DBili  0.3<H>  /  AST  55<H>  /  ALT  126<H>  /  AlkPhos  71  06-02    Urinalysis Basic - ( 2019 15:25 )    Color: Yellow / Appearance: slightly cloudy / S.020 / pH: x  Gluc: x / Ketone: Trace  / Bili: Negative / Urobili: 4   Blood: x / Protein: 15 / Nitrite: Negative   Leuk Esterase: Negative / RBC: 0-4 /HPF / WBC 3-5 /HPF   Sq Epi: x / Non Sq Epi: Neg.-Few / Bacteria: Trace /HPF    LIVER FUNCTIONS - ( 2019 09:50 )  Alb: 3.2 g/dL / Pro: 6.5 g/dL / ALK PHOS: 71 U/L / ALT: 126 U/L DA / AST: 55 U/L / GGT: x           Culture - Blood (collected 2019 15:25)  Source: .Blood Blood-Venous  Preliminary Report (2019 02:01):    No growth to date.    Culture - Blood (collected 2019 15:25)  Source: .Blood Blood-Peripheral  Preliminary Report (2019 02:01):    No growth to date.    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

## 2019-06-04 LAB
ANION GAP SERPL CALC-SCNC: 9 MMOL/L — SIGNIFICANT CHANGE UP (ref 5–17)
BUN SERPL-MCNC: 21 MG/DL — SIGNIFICANT CHANGE UP (ref 7–23)
CALCIUM SERPL-MCNC: 8.9 MG/DL — SIGNIFICANT CHANGE UP (ref 8.4–10.5)
CHLORIDE SERPL-SCNC: 93 MMOL/L — LOW (ref 96–108)
CO2 SERPL-SCNC: 27 MMOL/L — SIGNIFICANT CHANGE UP (ref 22–31)
CREAT SERPL-MCNC: 0.87 MG/DL — SIGNIFICANT CHANGE UP (ref 0.5–1.3)
GLUCOSE SERPL-MCNC: 110 MG/DL — HIGH (ref 70–99)
HBA1C BLD-MCNC: 5.6 % — SIGNIFICANT CHANGE UP (ref 4–5.6)
HCT VFR BLD CALC: 37.1 % — LOW (ref 39–50)
HGB BLD-MCNC: 13 G/DL — SIGNIFICANT CHANGE UP (ref 13–17)
MCHC RBC-ENTMCNC: 28.4 PG — SIGNIFICANT CHANGE UP (ref 27–34)
MCHC RBC-ENTMCNC: 35 GM/DL — SIGNIFICANT CHANGE UP (ref 32–36)
MCV RBC AUTO: 81.2 FL — SIGNIFICANT CHANGE UP (ref 80–100)
NRBC # BLD: 0 /100 WBCS — SIGNIFICANT CHANGE UP (ref 0–0)
PLATELET # BLD AUTO: 185 K/UL — SIGNIFICANT CHANGE UP (ref 150–400)
POTASSIUM SERPL-MCNC: 4.2 MMOL/L — SIGNIFICANT CHANGE UP (ref 3.5–5.3)
POTASSIUM SERPL-SCNC: 4.2 MMOL/L — SIGNIFICANT CHANGE UP (ref 3.5–5.3)
RBC # BLD: 4.57 M/UL — SIGNIFICANT CHANGE UP (ref 4.2–5.8)
RBC # FLD: 13.2 % — SIGNIFICANT CHANGE UP (ref 10.3–14.5)
SODIUM SERPL-SCNC: 129 MMOL/L — LOW (ref 135–145)
SODIUM UR-SCNC: 72 MMOL/L — SIGNIFICANT CHANGE UP
TSH SERPL-MCNC: 1.44 UIU/ML — SIGNIFICANT CHANGE UP (ref 0.27–4.2)
URATE SERPL-MCNC: 2.3 MG/DL — LOW (ref 3.4–8.8)
WBC # BLD: 8.07 K/UL — SIGNIFICANT CHANGE UP (ref 3.8–10.5)
WBC # FLD AUTO: 8.07 K/UL — SIGNIFICANT CHANGE UP (ref 3.8–10.5)

## 2019-06-04 PROCEDURE — 99233 SBSQ HOSP IP/OBS HIGH 50: CPT

## 2019-06-04 PROCEDURE — 76700 US EXAM ABDOM COMPLETE: CPT | Mod: 26

## 2019-06-04 RX ORDER — SODIUM CHLORIDE 9 MG/ML
1 INJECTION INTRAMUSCULAR; INTRAVENOUS; SUBCUTANEOUS THREE TIMES A DAY
Refills: 0 | Status: DISCONTINUED | OUTPATIENT
Start: 2019-06-04 | End: 2019-06-05

## 2019-06-04 RX ADMIN — AMLODIPINE BESYLATE 5 MILLIGRAM(S): 2.5 TABLET ORAL at 05:37

## 2019-06-04 RX ADMIN — Medication 650 MILLIGRAM(S): at 05:45

## 2019-06-04 RX ADMIN — Medication 0.5 MILLIGRAM(S): at 22:04

## 2019-06-04 RX ADMIN — SODIUM CHLORIDE 1 GRAM(S): 9 INJECTION INTRAMUSCULAR; INTRAVENOUS; SUBCUTANEOUS at 11:51

## 2019-06-04 RX ADMIN — Medication 25 MILLIGRAM(S): at 16:28

## 2019-06-04 RX ADMIN — FINASTERIDE 5 MILLIGRAM(S): 5 TABLET, FILM COATED ORAL at 11:51

## 2019-06-04 RX ADMIN — Medication 650 MILLIGRAM(S): at 20:13

## 2019-06-04 RX ADMIN — Medication 25 MILLIGRAM(S): at 05:37

## 2019-06-04 RX ADMIN — LEVETIRACETAM 500 MILLIGRAM(S): 250 TABLET, FILM COATED ORAL at 05:37

## 2019-06-04 RX ADMIN — Medication 650 MILLIGRAM(S): at 00:15

## 2019-06-04 RX ADMIN — Medication 25 MILLIGRAM(S): at 22:05

## 2019-06-04 RX ADMIN — SODIUM CHLORIDE 1 GRAM(S): 9 INJECTION INTRAMUSCULAR; INTRAVENOUS; SUBCUTANEOUS at 05:37

## 2019-06-04 RX ADMIN — LEVETIRACETAM 500 MILLIGRAM(S): 250 TABLET, FILM COATED ORAL at 18:26

## 2019-06-04 RX ADMIN — Medication 1 MILLIGRAM(S): at 11:51

## 2019-06-04 RX ADMIN — Medication 100 MILLIGRAM(S): at 11:51

## 2019-06-04 RX ADMIN — SODIUM CHLORIDE 1 GRAM(S): 9 INJECTION INTRAMUSCULAR; INTRAVENOUS; SUBCUTANEOUS at 22:05

## 2019-06-04 RX ADMIN — Medication 650 MILLIGRAM(S): at 06:45

## 2019-06-04 RX ADMIN — SODIUM CHLORIDE 1 GRAM(S): 9 INJECTION INTRAMUSCULAR; INTRAVENOUS; SUBCUTANEOUS at 18:23

## 2019-06-04 RX ADMIN — TAMSULOSIN HYDROCHLORIDE 0.4 MILLIGRAM(S): 0.4 CAPSULE ORAL at 22:05

## 2019-06-04 NOTE — PROGRESS NOTE ADULT - PROBLEM SELECTOR PLAN 6
melatonin  Will order PRN ativan    DVT ppx;OOB  Dispo: Appreciate PT eval melatonin  Will order PRN ativan    DVT ppx;OOB  Dispo: Appreciate PT eval--rec rehab

## 2019-06-04 NOTE — CONSULT NOTE ADULT - ASSESSMENT
Patient is has an alcohol history s/p fall head injury with multiple intracranial hemorrhages including bilateral SDH, SAH, tentorial hemorrhage and left occipital hemorrhage /contusion.  His CT scan is much improved with only residual left occipital contusion with minimal hemorrhage.  Her neuro exam is non focal.  He has complaint dyslexia.  He presented at this time with encephalopathy due to hyponatremia and suspected SIADH, s/p head trauma.  Doubt seizure.    REC:  EEG, trend sodium supportive care, continue Keppra, thiamine.  Question need for rehab post TBI.

## 2019-06-04 NOTE — CONSULT NOTE ADULT - SUBJECTIVE AND OBJECTIVE BOX
Neurology consult    CHRIS [id number]     Patient is a 73y old  Male who presents with a chief complaint of syncope (03 Jun 2019 15:12)      HPI:  73M PMH alcohol abuse, recent fall with bilateral SDH (nonsurgical tx) presents for fever and syncope.  Patient was discharge from [facility] on 5/30/19 after a fall at a party/alcohol use.  He was found to have bilateral SDH and evaluated by neurosurgery and neurology.  He did not undergo any surgical intervention and was started on keppra to prevent seizures.   He feels okay. Denies chest pain, sob, nausea, vomiting, diarrhea, headache. (01 Jun 2019 17:27)    Patient admitted to [facility] after head trauma fall and multifocal intracranial hemorrhages.  CT head improving with only residual left occipital contusion and small amount of hemorrhages.  Presented confused says he cannot read no headache or seizures.  Found to be hypernatremic.      REVIEW OF SYSTEMS:    Constitutional: No fever, chills, fatigue, weakness  Eyes: no eye pain, visual disturbances, or discharge  ENT:  No difficulty hearing, tinnitus, vertigo; No sinus or throat pain  Neck: No pain or stiffness  Respiratory: No cough, dyspnea, wheezing   Cardiovascular: No chest pain, palpitations,   Gastrointestinal: No abdominal or epigastric pain. No nausea, vomiting  No diarrhea or constipation.   Genitourinary: No dysuria, frequency, hematuria or incontinence  Neurological: No headaches, lightheadedness, vertigo, numbness or tremors  Psychiatric: No depression, anxiety, mood swings or difficulty sleeping  Musculoskeletal: No joint pain or swelling; No muscle, back or extremity pain  Skin: No itching, burning, rashes or lesions   Lymph Nodes: No enlarged glands  Endocrine: No heat or cold intolerance; No hair loss, No h/o diabetes or thyroid dysfunction  Allergy and Immunologic: No hives or eczema    MEDICATIONS    acetaminophen   Tablet .. 650 milliGRAM(s) Oral every 6 hours PRN  amLODIPine   Tablet 5 milliGRAM(s) Oral daily  finasteride 5 milliGRAM(s) Oral daily  folic acid 1 milliGRAM(s) Oral daily  levETIRAcetam 500 milliGRAM(s) Oral two times a day  meclizine 25 milliGRAM(s) Oral three times a day  melatonin 3 milliGRAM(s) Oral at bedtime  sodium chloride 1 Gram(s) Oral every 6 hours  tamsulosin 0.4 milliGRAM(s) Oral at bedtime  thiamine 100 milliGRAM(s) Oral daily  zolpidem 5 milliGRAM(s) Oral at bedtime PRN      PMH: ICH (intracerebral hemorrhage)  BPH (benign prostatic hyperplasia)       PSH: No significant past surgical history  No significant past surgical history      Family history:   FAMILY HISTORY:      SOCIAL HISTORY:  No history of tobacco or alcohol use     Allergies    Allergy Status Unknown  No Known Allergies    Intolerances            Vital Signs Last 24 Hrs  T(C): 36.9 (04 Jun 2019 05:34), Max: 37.1 (03 Jun 2019 10:10)  T(F): 98.5 (04 Jun 2019 05:34), Max: 98.7 (03 Jun 2019 10:10)  HR: 66 (04 Jun 2019 05:34) (65 - 78)  BP: 143/72 (04 Jun 2019 05:34) (143/72 - 155/74)  BP(mean): --  RR: 17 (04 Jun 2019 05:34) (17 - 19)  SpO2: 96% (04 Jun 2019 05:34) (95% - 97%)      On Neurological Examination:    Head: normocephalic Neck: supple no carotid bruits    Mental Status - Patient is alert oriented except exact date speech intact    Cranial Nerves - PERRL, EOMI, VFF, normal V through XII no nystagmus    Motor Exam :  No drift normal strength tone and coordination no abnormal movements no focality    Sensory    Intact to light touch and pinprick bilaterally     Reflexes:  symmetric  plantars withdrawl    Gait -  not tested                                                        LABS:  CBC Full  -  ( 04 Jun 2019 05:49 )  WBC Count : 8.07 K/uL  RBC Count : 4.57 M/uL  Hemoglobin : 13.0 g/dL  Hematocrit : 37.1 %  Platelet Count - Automated : x  Mean Cell Volume : 81.2 fl  Mean Cell Hemoglobin : 28.4 pg  Mean Cell Hemoglobin Concentration : 35.0 gm/dL  Auto Neutrophil # : x  Auto Lymphocyte # : x  Auto Monocyte # : x  Auto Eosinophil # : x  Auto Basophil # : x  Auto Neutrophil % : x  Auto Lymphocyte % : x  Auto Monocyte % : x  Auto Eosinophil % : x  Auto Basophil % : x      06-03    127<L>  |  91<L>  |  21  ----------------------------<  126<H>  4.1   |  27  |  0.92    Ca    9.1      03 Jun 2019 17:10    TPro  6.5  /  Alb  3.2<L>  /  TBili  1.5<H>  /  DBili  0.3<H>  /  AST  55<H>  /  ALT  126<H>  /  AlkPhos  71  06-02    LIVER FUNCTIONS - ( 02 Jun 2019 09:50 )  Alb: 3.2 g/dL / Pro: 6.5 g/dL / ALK PHOS: 71 U/L / ALT: 126 U/L DA / AST: 55 U/L / GGT: x           Hemoglobin A1C:                 RADIOLOGY  CT:   < from: CT Brain Stroke Protocol (06.01.19 @ 14:27) >    EXAM:  CT BRAIN STROKE PROTOCOL      PROCEDURE DATE:  06/01/2019        INTERPRETATION:  CLINICAL Indications:  syncope    COMPARISON: CT head dated 5/31/2019    TECHNIQUE: Noncontrast CT of the head. Multiplanar reformations are   submitted.    FINDINGS:  Redemonstrated is acute/subacute left occipital lobe hemorrhage on image   9, series 2 with areas of hemorrhage, unchanged. Moderate cytotoxic edema   in the left occipital lobe. No new infarct. No new hemorrhage.    There is no new compelling evidence for an acute transcortical   infarction. There is no new evidence of mass, but disc mass effect,   midline shift or extra-axial fluid collection. The lateral ventricles and   cortical sulci are age-appropriate in size and configuration. Tiny mucous   tension cyst in the left sphenoid sinus. The orbits, mastoid air cells   and remaining visualized paranasal sinuses are unremarkable. The   calvarium is intact.    IMPRESSION: Stable left occipital hemorrhage with stable small   hemorrhages.              < end of copied text >

## 2019-06-04 NOTE — ED ADULT NURSE REASSESSMENT NOTE - NS ED NURSE REASSESS COMMENT FT1
Received report from Ada SMITH. Pt A&Ox3. C/o headache 3/10. Given Tylenol 650mg. Will continue to monitor & reassess.

## 2019-06-04 NOTE — PROGRESS NOTE ADULT - PROBLEM SELECTOR PLAN 1
suspect secondary to hyponatremia and recent SDH  CT head with no changes  Monitored on tele w no acute events  Trops negative  Neuro consult-Dr Leiva-EEG pending

## 2019-06-04 NOTE — PROGRESS NOTE ADULT - ASSESSMENT
73M PMH alcohol use s/p recent fall found to have bilateral SDH, discharged home 3 days ago from [facility] presents for syncope, likely secondary to hyponatremia due to SIADH in setting of recent SDH. Sodium with slight improvement today

## 2019-06-04 NOTE — PROGRESS NOTE ADULT - PROBLEM SELECTOR PLAN 2
na 129 this AM  Suspect due to recent SDH  Fluid restrict  Cont salt tabs  Appreciate renal consult-Dr Sanchez  Renal following

## 2019-06-04 NOTE — PROGRESS NOTE ADULT - SUBJECTIVE AND OBJECTIVE BOX
Patient is a 73y old  Male who presents with a chief complaint of fever, falls (2019 07:38)      Patient seen and examined at bedside.    ALLERGIES:  Allergy Status Unknown  No Known Allergies    MEDICATIONS  (STANDING):  amLODIPine   Tablet 5 milliGRAM(s) Oral daily  finasteride 5 milliGRAM(s) Oral daily  folic acid 1 milliGRAM(s) Oral daily  levETIRAcetam 500 milliGRAM(s) Oral two times a day  meclizine 25 milliGRAM(s) Oral three times a day  sodium chloride 1 Gram(s) Oral three times a day  tamsulosin 0.4 milliGRAM(s) Oral at bedtime  thiamine 100 milliGRAM(s) Oral daily    MEDICATIONS  (PRN):  acetaminophen   Tablet .. 650 milliGRAM(s) Oral every 6 hours PRN Temp greater or equal to 38C (100.4F), Mild Pain (1 - 3)  zolpidem 5 milliGRAM(s) Oral at bedtime PRN Insomnia    Vital Signs Last 24 Hrs  T(F): 98.7 (2019 10:56), Max: 98.7 (2019 10:56)  HR: 69 (2019 10:56) (65 - 78)  BP: 137/67 (2019 10:56) (137/67 - 155/74)  RR: 18 (2019 10:56) (17 - 19)  SpO2: 98% (2019 10:56) (95% - 98%)  I&O's Summary    2019 07:01  -  2019 07:00  --------------------------------------------------------  IN: 460 mL / OUT: 1000 mL / NET: -540 mL      BMI (kg/m2): 25.9 (19 @ 20:30), 23.9 (19 @ 18:05)  PHYSICAL EXAM:  General: NAD, A/O x 3  ENT: MMM  Neck: Supple, No JVD  Lungs: Clear to auscultation bilaterally  Cardio: RRR, S1/S2, No murmurs  Abdomen: Soft, Nontender, Nondistended; Bowel sounds present  Extremities: No calf tenderness, No pitting edema    LABS:                        13.0   8.07  )-----------( 185      ( 2019 05:49 )             37.1       -    129  |  93  |  21  ----------------------------<  110  4.2   |  27  |  0.87    Ca    8.9      2019 05:49  Phos  3.0         TPro  6.5  /  Alb  3.2  /  TBili  1.5  /  DBili  0.3  /  AST  55  /  ALT  126  /  AlkPhos  71       eGFR if Non African American: 87 mL/min/1.73M2 (19 @ 05:49)  eGFR if : 101 mL/min/1.73M2 (19 @ 05:49)    PT/INR - ( 2019 15:09 )   PT: 13.1 sec;   INR: 1.16 ratio         PTT - ( 2019 15:09 )  PTT:23.8 sec   Lactate, Blood: 1.5 mmol/L ( @ 15:25)    CARDIAC MARKERS ( 2019 21:51 )  .038 ng/mL / x     / x     / x     / x      CARDIAC MARKERS ( 2019 15:09 )  .045 ng/mL / x     / x     / x     / x      CARDIAC MARKERS ( 2019 15:09 )  x     / x     / 330 U/L / x     / x            TSH 1.44   TSH with FT4 reflex --  Total T3 --        CAPILLARY BLOOD GLUCOSE         [id number] 5.6    Urinalysis Basic - ( 2019 15:25 )    Color: Yellow / Appearance: slightly cloudy / S.020 / pH: x  Gluc: x / Ketone: Trace  / Bili: Negative / Urobili: 4   Blood: x / Protein: 15 / Nitrite: Negative   Leuk Esterase: Negative / RBC: 0-4 /HPF / WBC 3-5 /HPF   Sq Epi: x / Non Sq Epi: Neg.-Few / Bacteria: Trace /HPF        Culture - Blood (collected 2019 15:25)  Source: .Blood Blood-Venous  Preliminary Report (2019 02:01):    No growth to date.    Culture - Blood (collected 2019 15:25)  Source: .Blood Blood-Peripheral  Preliminary Report (2019 02:01):    No growth to date.    Culture - Urine (collected 2019 15:25)  Source: .Urine Clean Catch (Midstream)  Final Report (2019 18:23):    <10,000 CFU/mL Normal Urogenital Judy        RADIOLOGY & ADDITIONAL TESTS:    Care Discussed with Consultants/Other Providers:

## 2019-06-04 NOTE — PROGRESS NOTE ADULT - SUBJECTIVE AND OBJECTIVE BOX
No distress    Vital Signs Last 24 Hrs  T(C): 37.7 (06-04-19 @ 16:00), Max: 37.7 (06-04-19 @ 16:00)  T(F): 99.8 (06-04-19 @ 16:00), Max: 99.8 (06-04-19 @ 16:00)  HR: 71 (06-04-19 @ 16:00) (66 - 78)  BP: 139/61 (06-04-19 @ 16:00) (137/67 - 147/78)  RR: 17 (06-04-19 @ 16:00) (17 - 18)  SpO2: 96% (06-04-19 @ 16:00) (96% - 98%)    Lungs clear to auscultation  Card S12S2  Abdomen soft, NT, ND  Extr no edema                        13.0   8.07  )-----------( 185      ( 04 Jun 2019 05:49 )             37.1     04 Jun 2019 05:49    129    |  93     |  21     ----------------------------<  110    4.2     |  27     |  0.87     Ca    8.9        04 Jun 2019 05:49    acetaminophen   Tablet .. 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

## 2019-06-04 NOTE — PHYSICAL THERAPY INITIAL EVALUATION ADULT - ADDITIONAL COMMENTS
pt lives w/spouse in a multi level house, 2 [address] w/rail. pt independent w/ambulation and ADL's prior to fall approx 5/30, per spouse

## 2019-06-05 PROBLEM — I61.9 NONTRAUMATIC INTRACEREBRAL HEMORRHAGE, UNSPECIFIED: Chronic | Status: ACTIVE | Noted: 2019-05-31

## 2019-06-05 LAB
ANION GAP SERPL CALC-SCNC: 8 MMOL/L — SIGNIFICANT CHANGE UP (ref 5–17)
BUN SERPL-MCNC: 26 MG/DL — HIGH (ref 7–23)
CALCIUM SERPL-MCNC: 8.9 MG/DL — SIGNIFICANT CHANGE UP (ref 8.4–10.5)
CHLORIDE SERPL-SCNC: 94 MMOL/L — LOW (ref 96–108)
CHLORIDE UR-SCNC: 55 MMOL/L — SIGNIFICANT CHANGE UP
CO2 SERPL-SCNC: 27 MMOL/L — SIGNIFICANT CHANGE UP (ref 22–31)
CREAT SERPL-MCNC: 0.96 MG/DL — SIGNIFICANT CHANGE UP (ref 0.5–1.3)
GLUCOSE SERPL-MCNC: 108 MG/DL — HIGH (ref 70–99)
HCT VFR BLD CALC: 36.6 % — LOW (ref 39–50)
HGB BLD-MCNC: 12.7 G/DL — LOW (ref 13–17)
MCHC RBC-ENTMCNC: 28.9 PG — SIGNIFICANT CHANGE UP (ref 27–34)
MCHC RBC-ENTMCNC: 34.7 GM/DL — SIGNIFICANT CHANGE UP (ref 32–36)
MCV RBC AUTO: 83.4 FL — SIGNIFICANT CHANGE UP (ref 80–100)
NRBC # BLD: 0 /100 WBCS — SIGNIFICANT CHANGE UP (ref 0–0)
OSMOLALITY UR: 616 MOSM/KG — SIGNIFICANT CHANGE UP (ref 50–1200)
PLATELET # BLD AUTO: 204 K/UL — SIGNIFICANT CHANGE UP (ref 150–400)
POTASSIUM SERPL-MCNC: 4.1 MMOL/L — SIGNIFICANT CHANGE UP (ref 3.5–5.3)
POTASSIUM SERPL-SCNC: 4.1 MMOL/L — SIGNIFICANT CHANGE UP (ref 3.5–5.3)
RBC # BLD: 4.39 M/UL — SIGNIFICANT CHANGE UP (ref 4.2–5.8)
RBC # FLD: 13.2 % — SIGNIFICANT CHANGE UP (ref 10.3–14.5)
SODIUM SERPL-SCNC: 129 MMOL/L — LOW (ref 135–145)
SODIUM UR-SCNC: 68 MMOL/L — SIGNIFICANT CHANGE UP
WBC # BLD: 8.56 K/UL — SIGNIFICANT CHANGE UP (ref 3.8–10.5)
WBC # FLD AUTO: 8.56 K/UL — SIGNIFICANT CHANGE UP (ref 3.8–10.5)

## 2019-06-05 PROCEDURE — 99223 1ST HOSP IP/OBS HIGH 75: CPT

## 2019-06-05 RX ORDER — HYDROCORTISONE 1 %
1 OINTMENT (GRAM) TOPICAL
Refills: 0 | Status: DISCONTINUED | OUTPATIENT
Start: 2019-06-05 | End: 2019-06-07

## 2019-06-05 RX ORDER — TOLVAPTAN 15 MG/1
15 TABLET ORAL ONCE
Refills: 0 | Status: COMPLETED | OUTPATIENT
Start: 2019-06-05 | End: 2019-06-05

## 2019-06-05 RX ADMIN — SODIUM CHLORIDE 1 GRAM(S): 9 INJECTION INTRAMUSCULAR; INTRAVENOUS; SUBCUTANEOUS at 06:25

## 2019-06-05 RX ADMIN — AMLODIPINE BESYLATE 5 MILLIGRAM(S): 2.5 TABLET ORAL at 06:26

## 2019-06-05 RX ADMIN — Medication 25 MILLIGRAM(S): at 21:23

## 2019-06-05 RX ADMIN — Medication 650 MILLIGRAM(S): at 03:43

## 2019-06-05 RX ADMIN — Medication 25 MILLIGRAM(S): at 06:25

## 2019-06-05 RX ADMIN — Medication 1 APPLICATION(S): at 17:33

## 2019-06-05 RX ADMIN — TOLVAPTAN 15 MILLIGRAM(S): 15 TABLET ORAL at 14:26

## 2019-06-05 RX ADMIN — Medication 25 MILLIGRAM(S): at 14:26

## 2019-06-05 RX ADMIN — Medication 650 MILLIGRAM(S): at 04:30

## 2019-06-05 RX ADMIN — Medication 1 MILLIGRAM(S): at 11:39

## 2019-06-05 RX ADMIN — TAMSULOSIN HYDROCHLORIDE 0.4 MILLIGRAM(S): 0.4 CAPSULE ORAL at 21:23

## 2019-06-05 RX ADMIN — FINASTERIDE 5 MILLIGRAM(S): 5 TABLET, FILM COATED ORAL at 11:39

## 2019-06-05 RX ADMIN — LEVETIRACETAM 500 MILLIGRAM(S): 250 TABLET, FILM COATED ORAL at 17:32

## 2019-06-05 RX ADMIN — Medication 100 MILLIGRAM(S): at 11:38

## 2019-06-05 RX ADMIN — LEVETIRACETAM 500 MILLIGRAM(S): 250 TABLET, FILM COATED ORAL at 06:26

## 2019-06-05 NOTE — CONSULT NOTE ADULT - ASSESSMENT
72 y/o male with an unwitnessed fall suffering bilateral SDH and SAH, managed non-operatively, with subsequent fall and hyponatremia likely secondary to TBI, also on keppra for seizure ppx.    Recommendations: 72 y/o male with an unwitnessed fall suffering bilateral SDH and SAH, managed non-operatively, with subsequent fall and hyponatremia likely secondary to TBI, also on keppra for seizure ppx, with mild cognitive and functional deficits.    Recommendations:   ST consult pending.   Patient would benefit from an intensive acute IPR program to address gait, balance, stair training, improving independence with ADLs, and to work on cognitive deficits for a 7 day course of treatment prior to d/c home with wife. Patient has medically needs that require daily physician monitoring.   Will discuss with rehab attending. 72 y/o male with an unwitnessed fall suffering bilateral SDH and SAH, managed non-operatively, with subsequent fall and hyponatremia likely secondary to TBI, also on keppra for seizure ppx, with mild cognitive and functional deficits.    Recommendations:   ST consult pending.   Patient would benefit from an intensive acute IPR program to address gait, balance, stair training, improving independence with ADLs, and to work on cognitive deficits for a 7 day course of treatment prior to d/c home with wife. Patient has medical needs that require daily physician monitoring.   Discussed with attending who agrees with plan for admission to acute IPR here at [facility].

## 2019-06-05 NOTE — PROGRESS NOTE ADULT - ASSESSMENT
72 y/o male with an unwitnessed fall suffering bilateral SDH and SAH, managed non-operatively, with subsequent fall and hyponatremia likely secondary to TBI, also on keppra for seizure ppx.    Recommendations:

## 2019-06-05 NOTE — CONSULT NOTE ADULT - SUBJECTIVE AND OBJECTIVE BOX
Mr. Dudley is a 73 year old male with PMHx of BPH who was transferred from [facility] to [facility] on 5/28/19 after suffering a fall 2 days prior to admission. Patient reports that he was home and drinking. States that he was home alone and that his wife was on vacation. He remembers seeing the evidence of multiple falls at home with blood on the floor in a couple of areas. Patient states he does not remember when or how he fell. Wife reports that he was to pick her up from the airport the day after the fall/falls occurred and a neighbor decided that he was unsafe to drive. Once his wife returned home, she brought him to the ER at [facility]. CT of the head showed acute bilateral SDH and SAH. At [facility], he was also noted to have a seizure and received Keppra and Ativan. Patient was then transferred to [facility] for further evaluation. Patient did not need surgical intervention and was discharged home on 5/30/19. Keppra was continued to prevent further seizures.    Patient was readmitted to [facility] on 6/1/19. Wife reports that patient was found to be lethargic at home and had difficulty ambulating. Daughter came and had to "drag" him out to the car b/c he was unable to walk. Repeat CT of the head showed stable left occipital hemorrhage with stable small hemorrhages.   Neurology was consulted. EEG done without signs of seizure activity. Patient found to have hyponatremia. Nephrology consulted. Will likely order tolvaptan.   PT consulted and found patient to need min A for ambulation yesterday with rolling walker.   OT consulted and found patient ot be supervision to  for ADLs and mobility this morning.   PM&R consulted to weigh in on disposition planning and recommendations.   Per hospitalist patient will be medically stable tomorrow for discharge from telemetry unit.    PAST MEDICAL & SURGICAL HISTORY:  ICH (intracerebral hemorrhage)  BPH (benign prostatic hyperplasia)  No significant past surgical history      Allergies  No Known Allergies      Social Hx: pt lives w/spouse in a multi level house with no stairs to enter. There are 5 stairs down to den and 10 steps up to kitchen area. pt independent w/ambulation and ADL's prior to fall approx 5/30. 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     Smoking - Denied  EtOH - Daily wine/beer  Drugs - Denied      TODAY'S SUBJECTIVE & REVIEW OF SYMPTOMS:  Patient reports persistent HA that is bothersome but not severe. Reports + dizziness and intermittent nausea with positional changes. No vomiting. Denies light sensitivity or sensitivity to loud noises. Does report a new ringing in the ears since the fall. Reports that he is trouble with reading. Denies that the words on the page are blurry but is having difficulty interpreting some of the words.   Wife and daughter report that his speech and processing is slower than normal.   Denies CP, SOB, palpitations, sensory changes, bowel or bladder issues. Sleeping ok. Denies fevers and chills.   Reports that he feels that his balance has changed.   +[place] and recommended to have hearing aids made.       06-05    129<L>  |  94<L>  |  26<H>  ----------------------------<  108<H>  4.1   |  27  |  0.96    Ca    8.9      05 Jun 2019 06:27                          12.7   8.56  )-----------( 204      ( 05 Jun 2019 06:27 )             36.6   < from: EEG Extended Monitoring 41-60 Min (06.04.19 @ 13:08) >  EXAM:  EEG EXTENDED 41-60 MIN      PROCEDURE DATE:  06/04/2019        INTERPRETATION:  Background Pattern and Specific Features: The background   is generally slow and disorganized consisting of diffuse low to medium   voltage theta and delta activity. Poorly modulated alpha activity is   rarely seen posteriorly.  Beta activity is seen occasionally bilaterally.    There is electrode artifact.  There are no definite focal findings   persistent asymmetries or epileptogenic features.    Stimulation: Photic stimulation was unremarkable hyperventilation was not   performed.    Impression: Abnormal record on the basis of generalized background   disorganization and bilateral slowing. The recording suggests bilateral   cerebral dysfunction but is without focal findings persistent asymmetries   or epileptiform discharges.      CASSIUS GUERRA M.D., Attending Neurologist  This document has been electronically signed. Jun 4 2019  4:04PM     < end of copied text >    < from: CT Brain Stroke Protocol (06.01.19 @ 14:27) >    EXAM:  CT BRAIN STROKE PROTOCOL      PROCEDURE DATE:  06/01/2019        INTERPRETATION:  CLINICAL Indications:  syncope    COMPARISON: CT head dated 5/31/2019    TECHNIQUE: Noncontrast CT of the head. Multiplanar reformations are   submitted.    FINDINGS:  Redemonstrated is acute/subacute left occipital lobe hemorrhage on image   9, series 2 with areas of hemorrhage, unchanged. Moderate cytotoxic edema   in the left occipital lobe. No new infarct. No new hemorrhage.    There is no new compelling evidence for an acute transcortical   infarction. There is no new evidence of mass, but disc mass effect,   midline shift or extra-axial fluid collection. The lateral ventricles and   cortical sulci are age-appropriate in size and configuration. Tiny mucous   tension cyst in the left sphenoid sinus. The orbits, mastoid air cells   and remaining visualized paranasal sinuses are unremarkable. The   calvarium is intact.    IMPRESSION: Stable left occipital hemorrhage with stable small   hemorrhages.      SABINE MOBLEY   This document has been electronically signed. Jun 1 2019  2:31PM              < end of copied text >      PHYSICAL EXAM  Vital Signs Last 24 Hrs  T(C): 37.1 (05 Jun 2019 09:29), Max: 37.7 (04 Jun 2019 16:00)  T(F): 98.7 (05 Jun 2019 09:29), Max: 99.8 (04 Jun 2019 16:00)  HR: 77 (05 Jun 2019 09:29) (69 - 78)  BP: 122/62 (05 Jun 2019 09:29) (122/62 - 148/69)  RR: 16 (05 Jun 2019 09:29) (16 - 18)  SpO2: 98% (05 Jun 2019 09:29) (96% - 100%)    Constitutional - NAD, Comfortable  HEENT - PERRLA, no nystagmus or  saccades  Chest - CTA throughout   Cardiovascular - All extremities well perfused, S1S2, no murmurs   Abdomen - Soft, Non-distended, +BS   Extremities - no edema   Neurologic Exam -                    Cognitive - Awake, Alert, Oriented to self, place, date, year, situation  	 Able to read sentence however mistook "yoli" for "summer" while reading written sentence out loud.      Communication - Fluent, No dysarthria, Naming intact     Attention - Intact but appears to have very mild slowed processing, able to recite months of year backwards     Memory - Intact, able to recall 3/3 items after 3 minutes, difficulty with serial 7s     Cranial Nerves - CN 2-12 grossly intact     Motor -                     LEFT    UE - ShAB 5/5, EF 5/5, EE 5/5, WE 5/5,  5/5                    RIGHT UE - ShAB 5/5, EF 5/5, EE 5/5, WE 5/5,  5/5                    LEFT    LE - HF 5/5, KE 5/5, DF 5/5, PF 5/5                    RIGHT LE - HF 5/5, KE 5/5, DF 5/5, PF 5/5        Sensory - Intact to light touch bilat upper and lower extremities      Coordination - Finger-to-nose intact bilaterally, HTS intact bilaterally   Psychiatric - depressed affect     Functional exam:   Unsteady gait with and without use of RW. Patient needed cuing to avoid objects in the hallway.   Static balance impaired.   Unable to stand on toes or tandem walk without loosing balance. Mr. Dudley is a 73 year old male with PMHx of BPH who was transferred from [facility] to [facility] on 5/28/19 after suffering a fall 2 days prior to admission. Patient reports that he was home and drinking. States that he was home alone and that his wife was on vacation. He remembers seeing the evidence of multiple falls at home with blood on the floor in a couple of areas. Patient states he does not remember when or how he fell. Wife reports that he was to pick her up from the airport the day after the fall/falls occurred and a neighbor decided that he was unsafe to drive. Once his wife returned home, she brought him to the ER at [facility]. CT of the head showed acute bilateral SDH and SAH (bilateral SDH, SAH, tentorial hemorrhage and left occipital hemorrhage /contusion).  At [facility], he was also noted to have a seizure and received Keppra and Ativan. Patient was then transferred to [facility] for further evaluation. Patient did not need surgical intervention and was discharged home on 5/30/19. Keppra was continued to prevent further seizures.    Patient was readmitted to [facility] on 6/1/19. Wife reports that patient was found to be lethargic at home and had difficulty ambulating. Daughter came and had to "drag" him out to the car b/c he was unable to walk. Repeat CT of the head showed stable left occipital hemorrhage with stable small hemorrhages.   Neurology was consulted. EEG done without signs of seizure activity. Patient found to have hyponatremia. Nephrology consulted. Will likely order tolvaptan.   PT consulted and found patient to need min A for ambulation yesterday with rolling walker.   OT consulted and found patient ot be supervision to CG for ADLs and mobility this morning.   PM&R consulted to weigh in on disposition planning and recommendations.   Per hospitalist patient will be medically stable tomorrow for discharge from telemetry unit.    PAST MEDICAL & SURGICAL HISTORY:  ICH (intracerebral hemorrhage)  BPH (benign prostatic hyperplasia)  No significant past surgical history      Allergies  No Known Allergies      Social Hx: pt lives w/spouse in a multi level house with no stairs to enter. There are 5 stairs down to den and 10 steps up to kitchen area. pt independent w/ambulation and ADL's prior to fall approx 5/30. 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     Smoking - Denied  EtOH - Daily wine/beer  Drugs - Denied      TODAY'S SUBJECTIVE & REVIEW OF SYMPTOMS:  Patient reports persistent HA that is bothersome but not severe. Reports + dizziness and intermittent nausea with positional changes. No vomiting. Denies light sensitivity or sensitivity to loud noises. Does report a new ringing in the ears since the fall. Reports that he is trouble with reading. Denies that the words on the page are blurry but is having difficulty interpreting some of the words.   Wife and daughter report that his speech and processing is slower than normal.   Denies CP, SOB, palpitations, sensory changes, bowel or bladder issues. Sleeping ok. Denies fevers and chills.   Reports that he feels that his balance has changed.   +[place] and recommended to have hearing aids made.       06-05    129<L>  |  94<L>  |  26<H>  ----------------------------<  108<H>  4.1   |  27  |  0.96    Ca    8.9      05 Jun 2019 06:27                          12.7   8.56  )-----------( 204      ( 05 Jun 2019 06:27 )             36.6   < from: EEG Extended Monitoring 41-60 Min (06.04.19 @ 13:08) >  EXAM:  EEG EXTENDED 41-60 MIN      PROCEDURE DATE:  06/04/2019        INTERPRETATION:  Background Pattern and Specific Features: The background   is generally slow and disorganized consisting of diffuse low to medium   voltage theta and delta activity. Poorly modulated alpha activity is   rarely seen posteriorly.  Beta activity is seen occasionally bilaterally.    There is electrode artifact.  There are no definite focal findings   persistent asymmetries or epileptogenic features.    Stimulation: Photic stimulation was unremarkable hyperventilation was not   performed.    Impression: Abnormal record on the basis of generalized background   disorganization and bilateral slowing. The recording suggests bilateral   cerebral dysfunction but is without focal findings persistent asymmetries   or epileptiform discharges.      CASSIUS GUERRA M.D., Attending Neurologist  This document has been electronically signed. Jun 4 2019  4:04PM     < end of copied text >    < from: CT Brain Stroke Protocol (06.01.19 @ 14:27) >    EXAM:  CT BRAIN STROKE PROTOCOL      PROCEDURE DATE:  06/01/2019        INTERPRETATION:  CLINICAL Indications:  syncope    COMPARISON: CT head dated 5/31/2019    TECHNIQUE: Noncontrast CT of the head. Multiplanar reformations are   submitted.    FINDINGS:  Redemonstrated is acute/subacute left occipital lobe hemorrhage on image   9, series 2 with areas of hemorrhage, unchanged. Moderate cytotoxic edema   in the left occipital lobe. No new infarct. No new hemorrhage.    There is no new compelling evidence for an acute transcortical   infarction. There is no new evidence of mass, but disc mass effect,   midline shift or extra-axial fluid collection. The lateral ventricles and   cortical sulci are age-appropriate in size and configuration. Tiny mucous   tension cyst in the left sphenoid sinus. The orbits, mastoid air cells   and remaining visualized paranasal sinuses are unremarkable. The   calvarium is intact.    IMPRESSION: Stable left occipital hemorrhage with stable small   hemorrhages.      SABINE MOBLEY   This document has been electronically signed. Jun 1 2019  2:31PM              < end of copied text >      PHYSICAL EXAM  Vital Signs Last 24 Hrs  T(C): 37.1 (05 Jun 2019 09:29), Max: 37.7 (04 Jun 2019 16:00)  T(F): 98.7 (05 Jun 2019 09:29), Max: 99.8 (04 Jun 2019 16:00)  HR: 77 (05 Jun 2019 09:29) (69 - 78)  BP: 122/62 (05 Jun 2019 09:29) (122/62 - 148/69)  RR: 16 (05 Jun 2019 09:29) (16 - 18)  SpO2: 98% (05 Jun 2019 09:29) (96% - 100%)    Constitutional - NAD, Comfortable  HEENT - PERRLA, no nystagmus or  saccades  Chest - CTA throughout   Cardiovascular - All extremities well perfused, S1S2, no murmurs   Abdomen - Soft, Non-distended, +BS   Extremities - no edema   Neurologic Exam -                    Cognitive - Awake, Alert, Oriented to self, place, date, year, situation  	 Able to read sentence however mistook "[place]" for "summer" while reading written sentence out loud.      Communication - Fluent, No dysarthria, Naming intact     Attention - Intact but appears to have very mild slowed processing, able to recite months of year backwards     Memory - Intact, able to recall 3/3 items after 3 minutes, difficulty with serial 7s     Cranial Nerves - CN 2-12 grossly intact     Motor -                     LEFT    UE - ShAB 5/5, EF 5/5, EE 5/5, WE 5/5,  5/5                    RIGHT UE - ShAB 5/5, EF 5/5, EE 5/5, WE 5/5,  5/5                    LEFT    LE - HF 5/5, KE 5/5, DF 5/5, PF 5/5                    RIGHT LE - HF 5/5, KE 5/5, DF 5/5, PF 5/5        Sensory - Intact to light touch bilat upper and lower extremities      Coordination - Finger-to-nose intact bilaterally, HTS intact bilaterally   Psychiatric - depressed affect     Functional exam:   Unsteady gait with and without use of RW. Patient needed cuing to avoid objects in the hallway.   Static balance impaired.   Unable to stand on toes or tandem walk without loosing balance. Mr. Dudley is a 73 year old male with PMHx of BPH who was transferred from [facility] to [facility] on 5/28/19 after suffering a fall 2 days prior to admission. Patient reports that he was home and drinking. States that he was home alone and that his wife was on vacation. He remembers seeing the evidence of multiple falls at home with blood on the floor in a couple of areas. Patient states he does not remember when or how he fell. Wife reports that he was to pick her up from the airport the day after the fall/falls occurred and a neighbor decided that he was unsafe to drive. Once his wife returned home, she brought him to the ER at [facility]. CT of the head showed acute bilateral SDH and SAH (bilateral SDH, SAH, tentorial hemorrhage and left occipital hemorrhage /contusion).  At [facility], he was also noted to have a seizure and received Keppra and Ativan. Patient was then transferred to [facility] for further evaluation. Patient did not need surgical intervention and was discharged home on 5/30/19. Keppra was continued to prevent further seizures.    Patient was readmitted to [facility] on 6/1/19. Wife reports that patient was found to be lethargic at home and had difficulty ambulating. Daughter came and had to "drag" him out to the car b/c he was unable to walk. Repeat CT of the head showed stable left occipital hemorrhage with stable small hemorrhages.   Neurology was consulted. EEG done without signs of seizure activity. Patient found to have hyponatremia. Nephrology consulted. Will likely order tolvaptan.   PT consulted and found patient to need min A for ambulation yesterday with rolling walker.   OT consulted and found patient ot be supervision to CG for ADLs and mobility this morning.   PM&R consulted to weigh in on disposition planning and recommendations.   Per hospitalist patient will be medically stable tomorrow for discharge from telemetry unit.    PAST MEDICAL & SURGICAL HISTORY:  ICH (intracerebral hemorrhage)  BPH (benign prostatic hyperplasia)  No significant past surgical history      Allergies  No Known Allergies      Social Hx: pt lives w/spouse in a multi level house with no stairs to enter. There are 5 stairs down to den and 10 steps up to kitchen area. pt independent w/ambulation and ADL's prior to fall. 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     Smoking - Denied  EtOH - Daily wine/beer  Drugs - Denied      TODAY'S SUBJECTIVE & REVIEW OF SYMPTOMS:  Patient reports persistent HA that is bothersome but not severe. Reports + dizziness and intermittent nausea with positional changes. No vomiting. Denies light sensitivity or sensitivity to loud noises. Does report a new ringing in the ears since the fall. Reports that he is trouble with reading. Denies that the words on the page are blurry but is having difficulty interpreting some of the words.   Wife and daughter report that his speech and processing is slower than normal.   Denies CP, SOB, palpitations, sensory changes, bowel or bladder issues. Sleeping ok. Denies fevers and chills.   Reports that he feels that his balance has changed.   +[place] and recommended to have hearing aids made.       06-05    129<L>  |  94<L>  |  26<H>  ----------------------------<  108<H>  4.1   |  27  |  0.96    Ca    8.9      05 Jun 2019 06:27                          12.7   8.56  )-----------( 204      ( 05 Jun 2019 06:27 )             36.6   < from: EEG Extended Monitoring 41-60 Min (06.04.19 @ 13:08) >  EXAM:  EEG EXTENDED 41-60 MIN      PROCEDURE DATE:  06/04/2019        INTERPRETATION:  Background Pattern and Specific Features: The background   is generally slow and disorganized consisting of diffuse low to medium   voltage theta and delta activity. Poorly modulated alpha activity is   rarely seen posteriorly.  Beta activity is seen occasionally bilaterally.    There is electrode artifact.  There are no definite focal findings   persistent asymmetries or epileptogenic features.    Stimulation: Photic stimulation was unremarkable hyperventilation was not   performed.    Impression: Abnormal record on the basis of generalized background   disorganization and bilateral slowing. The recording suggests bilateral   cerebral dysfunction but is without focal findings persistent asymmetries   or epileptiform discharges.      CASSIUS GUERRA M.D., Attending Neurologist  This document has been electronically signed. Jun 4 2019  4:04PM     < end of copied text >    < from: CT Brain Stroke Protocol (06.01.19 @ 14:27) >    EXAM:  CT BRAIN STROKE PROTOCOL      PROCEDURE DATE:  06/01/2019        INTERPRETATION:  CLINICAL Indications:  syncope    COMPARISON: CT head dated 5/31/2019    TECHNIQUE: Noncontrast CT of the head. Multiplanar reformations are   submitted.    FINDINGS:  Redemonstrated is acute/subacute left occipital lobe hemorrhage on image   9, series 2 with areas of hemorrhage, unchanged. Moderate cytotoxic edema   in the left occipital lobe. No new infarct. No new hemorrhage.    There is no new compelling evidence for an acute transcortical   infarction. There is no new evidence of mass, but disc mass effect,   midline shift or extra-axial fluid collection. The lateral ventricles and   cortical sulci are age-appropriate in size and configuration. Tiny mucous   tension cyst in the left sphenoid sinus. The orbits, mastoid air cells   and remaining visualized paranasal sinuses are unremarkable. The   calvarium is intact.    IMPRESSION: Stable left occipital hemorrhage with stable small   hemorrhages.      SABINE MOBLEY   This document has been electronically signed. Jun 1 2019  2:31PM              < end of copied text >      PHYSICAL EXAM  Vital Signs Last 24 Hrs  T(C): 37.1 (05 Jun 2019 09:29), Max: 37.7 (04 Jun 2019 16:00)  T(F): 98.7 (05 Jun 2019 09:29), Max: 99.8 (04 Jun 2019 16:00)  HR: 77 (05 Jun 2019 09:29) (69 - 78)  BP: 122/62 (05 Jun 2019 09:29) (122/62 - 148/69)  RR: 16 (05 Jun 2019 09:29) (16 - 18)  SpO2: 98% (05 Jun 2019 09:29) (96% - 100%)    Constitutional - NAD, Comfortable  HEENT - PERRLA, no nystagmus or  saccades  Chest - CTA throughout   Cardiovascular - All extremities well perfused, S1S2, no murmurs   Abdomen - Soft, Non-distended, +BS   Extremities - no edema   Neurologic Exam -                    Cognitive - Awake, Alert, Oriented to self, place, date, year, situation  	 Able to read sentence however mistook "[place]" for "summer" while reading written sentence out loud.      Communication - Fluent, No dysarthria, Naming intact     Attention - Intact but appears to have very mild slowed processing, able to recite months of year backwards     Memory - Intact, able to recall 3/3 items after 3 minutes, difficulty with serial 7s     Cranial Nerves - CN 2-12 grossly intact     Motor -                     LEFT    UE - ShAB 5/5, EF 5/5, EE 5/5, WE 5/5,  5/5                    RIGHT UE - ShAB 5/5, EF 5/5, EE 5/5, WE 5/5,  5/5                    LEFT    LE - HF 5/5, KE 5/5, DF 5/5, PF 5/5                    RIGHT LE - HF 5/5, KE 5/5, DF 5/5, PF 5/5        Sensory - Intact to light touch bilat upper and lower extremities      Coordination - Finger-to-nose intact bilaterally, HTS intact bilaterally   Psychiatric - depressed affect     Functional exam:   Unsteady gait with and without use of RW. Patient needed cuing to avoid objects in the hallway.   Static balance impaired.   Unable to stand on toes or tandem walk without loosing balance.

## 2019-06-05 NOTE — PROGRESS NOTE ADULT - PROBLEM SELECTOR PLAN 2
na 129 this AM  Suspect due to recent SDH  Fluid restrict  Cont salt tabs  Appreciate renal consult-Dr Sanchez  Renal following na 129 this AM  Suspect due to recent SDH  Fluid restrict  Cont salt tabs  Consider tolvaptan  Appreciate renal consult-Dr Sanchez  Renal following

## 2019-06-05 NOTE — PROGRESS NOTE ADULT - PROBLEM SELECTOR PLAN 1
suspect secondary to hyponatremia and recent SDH  CT head with no changes  Monitored on tele w no acute events  Trops negative  Neuro consult-Dr Leiva-EEG pending suspect secondary to hyponatremia and recent SDH  CT head with no changes  Monitored on tele w no acute events  Trops negative  Neuro consult-Dr Leiva-EEG with no focal findings

## 2019-06-05 NOTE — PROGRESS NOTE ADULT - ASSESSMENT
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 Sodium unchanged from yesterday-129. 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  Sodium unchanged from yesterday-129.  Renal following

## 2019-06-05 NOTE — PROGRESS NOTE ADULT - SUBJECTIVE AND OBJECTIVE BOX
No distress    Vital Signs Last 24 Hrs  T(C): 36.9 (06-05-19 @ 15:52), Max: 37.1 (06-05-19 @ 09:29)  T(F): 98.5 (06-05-19 @ 15:52), Max: 98.7 (06-05-19 @ 09:29)  HR: 85 (06-05-19 @ 15:52) (69 - 85)  BP: 115/58 (06-05-19 @ 15:52) (115/58 - 148/69)  RR: 16 (06-05-19 @ 15:52) (16 - 18)  SpO2: 96% (06-05-19 @ 15:52) (96% - 100%)    Lungs clear to auscultation  Card S12S2  Abdomen soft, NT, ND  Extr no edema                                12.7   8.56  )-----------( 204      ( 05 Jun 2019 06:27 )             36.6     05 Jun 2019 06:27    129    |  94     |  26     ----------------------------<  108    4.1     |  27     |  0.96     Ca    8.9        05 Jun 2019 06:27    acetaminophen   Tablet .. 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

## 2019-06-05 NOTE — OCCUPATIONAL THERAPY INITIAL EVALUATION ADULT - PERTINENT HX OF CURRENT PROBLEM, REHAB EVAL
Admitted for syncope and fever after d/c from [facility] for fall at party with alcohol use

## 2019-06-05 NOTE — PROGRESS NOTE ADULT - PROBLEM SELECTOR PLAN 6
melatonin  Will order PRN ativan    DVT ppx;OOB  Dispo: Appreciate PT eval--rec rehab. Awaiting PM&R eval

## 2019-06-05 NOTE — PROGRESS NOTE ADULT - SUBJECTIVE AND OBJECTIVE BOX
Patient is a 73y old  Male who presents with a chief complaint of fever, falls (04 Jun 2019 17:35). Reports he slept a bit better last night after receiving small dose Ativan.       Patient seen and examined at bedside.    ALLERGIES:  Allergy Status Unknown  No Known Allergies    MEDICATIONS  (STANDING):  amLODIPine   Tablet 5 milliGRAM(s) Oral daily  finasteride 5 milliGRAM(s) Oral daily  folic acid 1 milliGRAM(s) Oral daily  hydrocortisone 1% Ointment 1 Application(s) Topical two times a day  levETIRAcetam 500 milliGRAM(s) Oral two times a day  meclizine 25 milliGRAM(s) Oral three times a day  sodium chloride 1 Gram(s) Oral three times a day  tamsulosin 0.4 milliGRAM(s) Oral at bedtime  thiamine 100 milliGRAM(s) Oral daily    MEDICATIONS  (PRN):  acetaminophen   Tablet .. 650 milliGRAM(s) Oral every 6 hours PRN Temp greater or equal to 38C (100.4F), Mild Pain (1 - 3)  LORazepam     Tablet 0.5 milliGRAM(s) Oral at bedtime PRN Agitation    Vital Signs Last 24 Hrs  T(F): 98.7 (05 Jun 2019 09:29), Max: 99.8 (04 Jun 2019 16:00)  HR: 77 (05 Jun 2019 09:29) (69 - 78)  BP: 122/62 (05 Jun 2019 09:29) (122/62 - 148/69)  RR: 16 (05 Jun 2019 09:29) (16 - 18)  SpO2: 98% (05 Jun 2019 09:29) (96% - 100%)  I&O's Summary    04 Jun 2019 07:01  -  05 Jun 2019 07:00  --------------------------------------------------------  IN: 0 mL / OUT: 300 mL / NET: -300 mL    05 Jun 2019 07:01  -  05 Jun 2019 11:53  --------------------------------------------------------  IN: 120 mL / OUT: 1 mL / NET: 119 mL      BMI (kg/m2): 25.9 (06-01-19 @ 20:30), 23.9 (05-31-19 @ 18:05)  PHYSICAL EXAM:  General: NAD, A/O x 3  ENT: MMM  Neck: Supple, No JVD  Lungs: Clear to auscultation bilaterally  Cardio: RRR, S1/S2,   Abdomen: Soft, Nontender, Nondistended; Bowel sounds present  Extremities: No calf tenderness, No pitting edema  Back: rash improving--very small bumps on back    LABS:                        12.7   8.56  )-----------( 204      ( 05 Jun 2019 06:27 )             36.6       06-05    129  |  94  |  26  ----------------------------<  108  4.1   |  27  |  0.96    Ca    8.9      05 Jun 2019 06:27       eGFR if Non African American: 79 mL/min/1.73M2 (06-05-19 @ 06:27)  eGFR if : 92 mL/min/1.73M2 (06-05-19 @ 06:27)               TSH 1.44   TSH with FT4 reflex --  Total T3 --        CAPILLARY BLOOD GLUCOSE        06-04 [id number] 5.6        Culture - Blood (collected 01 Jun 2019 15:25)  Source: .Blood Blood-Venous  Preliminary Report (03 Jun 2019 02:01):    No growth to date.    Culture - Blood (collected 01 Jun 2019 15:25)  Source: .Blood Blood-Peripheral  Preliminary Report (03 Jun 2019 02:01):    No growth to date.    Culture - Urine (collected 01 Jun 2019 15:25)  Source: .Urine Clean Catch (Midstream)  Final Report (02 Jun 2019 18:23):    <10,000 CFU/mL Normal Urogenital Judy        RADIOLOGY & ADDITIONAL TESTS:    Care Discussed with Consultants/Other Providers: Patient is a 73y old  Male who presents with a chief complaint of fever, falls (04 Jun 2019 17:35). Reports he slept a bit better last night after receiving small dose Ativan.     Feels well. no acute issues.      Patient seen and examined at bedside.    ALLERGIES:  Allergy Status Unknown  No Known Allergies    MEDICATIONS  (STANDING):  amLODIPine   Tablet 5 milliGRAM(s) Oral daily  finasteride 5 milliGRAM(s) Oral daily  folic acid 1 milliGRAM(s) Oral daily  hydrocortisone 1% Ointment 1 Application(s) Topical two times a day  levETIRAcetam 500 milliGRAM(s) Oral two times a day  meclizine 25 milliGRAM(s) Oral three times a day  sodium chloride 1 Gram(s) Oral three times a day  tamsulosin 0.4 milliGRAM(s) Oral at bedtime  thiamine 100 milliGRAM(s) Oral daily    MEDICATIONS  (PRN):  acetaminophen   Tablet .. 650 milliGRAM(s) Oral every 6 hours PRN Temp greater or equal to 38C (100.4F), Mild Pain (1 - 3)  LORazepam     Tablet 0.5 milliGRAM(s) Oral at bedtime PRN Agitation    Vital Signs Last 24 Hrs  T(F): 98.7 (05 Jun 2019 09:29), Max: 99.8 (04 Jun 2019 16:00)  HR: 77 (05 Jun 2019 09:29) (69 - 78)  BP: 122/62 (05 Jun 2019 09:29) (122/62 - 148/69)  RR: 16 (05 Jun 2019 09:29) (16 - 18)  SpO2: 98% (05 Jun 2019 09:29) (96% - 100%)  I&O's Summary    04 Jun 2019 07:01  -  05 Jun 2019 07:00  --------------------------------------------------------  IN: 0 mL / OUT: 300 mL / NET: -300 mL    05 Jun 2019 07:01  -  05 Jun 2019 11:53  --------------------------------------------------------  IN: 120 mL / OUT: 1 mL / NET: 119 mL    BMI (kg/m2): 25.9 (06-01-19 @ 20:30), 23.9 (05-31-19 @ 18:05)  PHYSICAL EXAM:  General: NAD, A/O x 3  ENT: MMM  Neck: Supple, No JVD  Lungs: Clear to auscultation bilaterally  Cardio: RRR, S1/S2,   Abdomen: Soft, Nontender, Nondistended; Bowel sounds present  Extremities: No calf tenderness, No pitting edema  Back: rash improving--very small bumps on back    LABS:                        12.7   8.56  )-----------( 204      ( 05 Jun 2019 06:27 )             36.6       06-05    129  |  94  |  26  ----------------------------<  108  4.1   |  27  |  0.96    Ca    8.9      05 Jun 2019 06:27       eGFR if Non African American: 79 mL/min/1.73M2 (06-05-19 @ 06:27)  eGFR if : 92 mL/min/1.73M2 (06-05-19 @ 06:27)               TSH 1.44   TSH with FT4 reflex --  Total T3 --    CAPILLARY BLOOD GLUCOSE    06-04 [id number] 5.6    Culture - Blood (collected 01 Jun 2019 15:25)  Source: .Blood Blood-Venous  Preliminary Report (03 Jun 2019 02:01):    No growth to date.    Culture - Blood (collected 01 Jun 2019 15:25)  Source: .Blood Blood-Peripheral  Preliminary Report (03 Jun 2019 02:01):    No growth to date.    Culture - Urine (collected 01 Jun 2019 15:25)  Source: .Urine Clean Catch (Midstream)  Final Report (02 Jun 2019 18:23):    <10,000 CFU/mL Normal Urogenital Judy    RADIOLOGY & ADDITIONAL TESTS:    Care Discussed with Consultants/Other Providers:

## 2019-06-05 NOTE — PROGRESS NOTE ADULT - SUBJECTIVE AND OBJECTIVE BOX
Mr. Dudley is a 73 year old male with PMHx of BPH who was transferred from [facility] to [facility] on 5/28/19 after suffering a fall 2 days prior to admission. He reports that he was at a party and was drinking, suffering an unwitnessed fall later that evening. The following day he went to his neighbors house and was noted to have altered mental status. He was also noted to have abrasions on bilateral elbows. He later went [place]  his wife at the airport and was noted to be confused. He was then brought to [facility] and was found to have acute bilateral SDH and SAH. At [facility], he was also noted to have a seizure and received Keppra and Ativan. At [facility], GCS = 9. Primary survey intact.      73M PMH alcohol abuse, recent fall with bilateral SDH (nonsurgical tx) presents for fever and syncope.  Patient was discharge from [facility] on 5/30/19 after a fall at a party/alcohol use.  He was found to have bilateral SDH and evaluated by neurosurgery and neurology.  He did not undergo any surgical intervention and was started on keppra to prevent seizures.   He feels okay. Denies chest pain, sob, nausea, vomiting, diarrhea, headache.  Neurology consult   Nephrology consult   PT found patient to need min A for ambulation yesterday.   OT found patient ot be supervision to  for ADLs and mobility this morning.       PAST MEDICAL & SURGICAL HISTORY:  ICH (intracerebral hemorrhage)  BPH (benign prostatic hyperplasia)  No significant past surgical history      Allergies  No Known Allergies      Social Hx: pt lives w/spouse in a multi level house, 2 [address] w/rail. pt independent w/ambulation and ADL's prior to fall approx 5/30, per spouse    Smoking - Denied  EtOH - Daily wine/beer  Drugs - Denied      TODAY'S SUBJECTIVE & REVIEW OF SYMPTOMS:    [   ] Constitutional WNL  [   ] Cardio WNL  [   ] Resp WNL  [   ] GI WNL  [   ] Heme WNL  [   ] Endo WNL  [   ] Skin WNL  [   ] MSK WNL  [   ] Neuro WNL  [   ] Cognitive WNL  [   ] Psych WNL    06-05    129<L>  |  94<L>  |  26<H>  ----------------------------<  108<H>  4.1   |  27  |  0.96    Ca    8.9      05 Jun 2019 06:27                          12.7   8.56  )-----------( 204      ( 05 Jun 2019 06:27 )             36.6   < from: EEG Extended Monitoring 41-60 Min (06.04.19 @ 13:08) >  EXAM:  EEG EXTENDED 41-60 MIN      PROCEDURE DATE:  06/04/2019        INTERPRETATION:  Background Pattern and Specific Features: The background   is generally slow and disorganized consisting of diffuse low to medium   voltage theta and delta activity. Poorly modulated alpha activity is   rarely seen posteriorly.  Beta activity is seen occasionally bilaterally.    There is electrode artifact.  There are no definite focal findings   persistent asymmetries or epileptogenic features.    Stimulation: Photic stimulation was unremarkable hyperventilation was not   performed.    Impression: Abnormal record on the basis of generalized background   disorganization and bilateral slowing. The recording suggests bilateral   cerebral dysfunction but is without focal findings persistent asymmetries   or epileptiform discharges.      CASSIUS GUERRA M.D., Attending Neurologist  This document has been electronically signed. Jun 4 2019  4:04PM     < end of copied text >    < from: CT Brain Stroke Protocol (06.01.19 @ 14:27) >    EXAM:  CT BRAIN STROKE PROTOCOL      PROCEDURE DATE:  06/01/2019        INTERPRETATION:  CLINICAL Indications:  syncope    COMPARISON: CT head dated 5/31/2019    TECHNIQUE: Noncontrast CT of the head. Multiplanar reformations are   submitted.    FINDINGS:  Redemonstrated is acute/subacute left occipital lobe hemorrhage on image   9, series 2 with areas of hemorrhage, unchanged. Moderate cytotoxic edema   in the left occipital lobe. No new infarct. No new hemorrhage.    There is no new compelling evidence for an acute transcortical   infarction. There is no new evidence of mass, but disc mass effect,   midline shift or extra-axial fluid collection. The lateral ventricles and   cortical sulci are age-appropriate in size and configuration. Tiny mucous   tension cyst in the left sphenoid sinus. The orbits, mastoid air cells   and remaining visualized paranasal sinuses are unremarkable. The   calvarium is intact.    IMPRESSION: Stable left occipital hemorrhage with stable small   hemorrhages.      SABINE MOBLEY   This document has been electronically signed. Jun 1 2019  2:31PM              < end of copied text >      PHYSICAL EXAM  Constitutional - NAD, Comfortable  HEENT - NCAT, Saccades  Chest - No increased work of breathing  Cardiovascular - All extremities well perfused, S1S2  Abdomen - Soft, Non-distended  Extremities - No bilateral lower extremity edema  Neurologic Exam -                    Cognitive - Awake, Alert, Oriented to self, place, date, year, situation     Communication - Fluent, No dysarthria, Naming intact     Attention - Intact, able to recite days of week backwards     Memory - Intact, able to recall 3/3 items after 3 minutes     Cranial Nerves - CN 2-12 grossly intact     Motor -                     LEFT    UE - ShAB 5/5, EF 5/5, EE 5/5, WE 5/5,  5/5                    RIGHT UE - ShAB 5/5, EF 5/5, EE 5/5, WE 5/5,  5/5                    LEFT    LE - HF 5/5, KE 5/5, DF 5/5, PF 5/5                    RIGHT LE - HF 5/5, KE 5/5, DF 5/5, PF 5/5        Sensory - Intact to light touch diffusely     Coordination - Finger-to-nose intact bilaterally   Psychiatric - Affect WNL

## 2019-06-06 LAB
ANION GAP SERPL CALC-SCNC: 8 MMOL/L — SIGNIFICANT CHANGE UP (ref 5–17)
BUN SERPL-MCNC: 28 MG/DL — HIGH (ref 7–23)
CALCIUM SERPL-MCNC: 9.1 MG/DL — SIGNIFICANT CHANGE UP (ref 8.4–10.5)
CHLORIDE SERPL-SCNC: 100 MMOL/L — SIGNIFICANT CHANGE UP (ref 96–108)
CO2 SERPL-SCNC: 27 MMOL/L — SIGNIFICANT CHANGE UP (ref 22–31)
CREAT SERPL-MCNC: 1.09 MG/DL — SIGNIFICANT CHANGE UP (ref 0.5–1.3)
GLUCOSE SERPL-MCNC: 113 MG/DL — HIGH (ref 70–99)
HCT VFR BLD CALC: 38.1 % — LOW (ref 39–50)
HGB BLD-MCNC: 12.9 G/DL — LOW (ref 13–17)
MCHC RBC-ENTMCNC: 28.3 PG — SIGNIFICANT CHANGE UP (ref 27–34)
MCHC RBC-ENTMCNC: 33.9 GM/DL — SIGNIFICANT CHANGE UP (ref 32–36)
MCV RBC AUTO: 83.6 FL — SIGNIFICANT CHANGE UP (ref 80–100)
NRBC # BLD: 0 /100 WBCS — SIGNIFICANT CHANGE UP (ref 0–0)
PLATELET # BLD AUTO: 227 K/UL — SIGNIFICANT CHANGE UP (ref 150–400)
POTASSIUM SERPL-MCNC: 4.2 MMOL/L — SIGNIFICANT CHANGE UP (ref 3.5–5.3)
POTASSIUM SERPL-SCNC: 4.2 MMOL/L — SIGNIFICANT CHANGE UP (ref 3.5–5.3)
RBC # BLD: 4.56 M/UL — SIGNIFICANT CHANGE UP (ref 4.2–5.8)
RBC # FLD: 13.8 % — SIGNIFICANT CHANGE UP (ref 10.3–14.5)
SODIUM SERPL-SCNC: 135 MMOL/L — SIGNIFICANT CHANGE UP (ref 135–145)
WBC # BLD: 8.78 K/UL — SIGNIFICANT CHANGE UP (ref 3.8–10.5)
WBC # FLD AUTO: 8.78 K/UL — SIGNIFICANT CHANGE UP (ref 3.8–10.5)

## 2019-06-06 PROCEDURE — 99233 SBSQ HOSP IP/OBS HIGH 50: CPT

## 2019-06-06 RX ADMIN — Medication 1 APPLICATION(S): at 10:07

## 2019-06-06 RX ADMIN — FINASTERIDE 5 MILLIGRAM(S): 5 TABLET, FILM COATED ORAL at 11:39

## 2019-06-06 RX ADMIN — LEVETIRACETAM 500 MILLIGRAM(S): 250 TABLET, FILM COATED ORAL at 17:09

## 2019-06-06 RX ADMIN — Medication 25 MILLIGRAM(S): at 21:45

## 2019-06-06 RX ADMIN — Medication 650 MILLIGRAM(S): at 15:20

## 2019-06-06 RX ADMIN — Medication 100 MILLIGRAM(S): at 11:39

## 2019-06-06 RX ADMIN — Medication 1 APPLICATION(S): at 17:09

## 2019-06-06 RX ADMIN — Medication 25 MILLIGRAM(S): at 05:08

## 2019-06-06 RX ADMIN — Medication 1 MILLIGRAM(S): at 11:39

## 2019-06-06 RX ADMIN — LEVETIRACETAM 500 MILLIGRAM(S): 250 TABLET, FILM COATED ORAL at 05:08

## 2019-06-06 RX ADMIN — Medication 0.5 MILLIGRAM(S): at 21:45

## 2019-06-06 RX ADMIN — Medication 650 MILLIGRAM(S): at 16:20

## 2019-06-06 RX ADMIN — Medication 650 MILLIGRAM(S): at 21:49

## 2019-06-06 RX ADMIN — TAMSULOSIN HYDROCHLORIDE 0.4 MILLIGRAM(S): 0.4 CAPSULE ORAL at 21:45

## 2019-06-06 RX ADMIN — AMLODIPINE BESYLATE 5 MILLIGRAM(S): 2.5 TABLET ORAL at 05:08

## 2019-06-06 RX ADMIN — Medication 650 MILLIGRAM(S): at 05:08

## 2019-06-06 RX ADMIN — Medication 25 MILLIGRAM(S): at 17:08

## 2019-06-06 NOTE — PROGRESS NOTE ADULT - PROBLEM SELECTOR PLAN 2
s/p 1 dose of Tolvaptan; now resolved; Na 135  Suspect due to recent SDH  Cont salt tabs  Appreciate renal consult-Dr Sanchez

## 2019-06-06 NOTE — PROGRESS NOTE ADULT - PROBLEM SELECTOR PLAN 6
melatonin  Will order PRN ativan    DVT ppx;OOB  Dispo: Appreciate PT eval--rec rehab. Awaiting PM&R eval on melatonin and PRN ativan

## 2019-06-06 NOTE — SPEECH LANGUAGE PATHOLOGY EVALUATION - COMMENTS
Patient's biggest complaint is difficulty reading since hospitalization. Pt exhibits difficulty decoding at single word level as well as reduced reading comprehension of simple material when decoded correctly. The above domains were judged to be mildly impaired. no dysphonia suspected at time of eval Patient is consuming regular textures/thin liquids at this time with no signs/symptoms of dysphagia.  CT Head - 6/1 Stable left occipital hemorrhage with stable small hemorrhages. The above domains were judged to be mildly impaired. Patient exhibits difficulty recalling recently verbally presented information as well as difficulty providing solutions to everyday problems. Alternating/divided attention judged to be impaired (sustained attention WFL) Also exhibits mildy reduced awareness into deficits and reduced safety awareness. Patient would benefit from cognitive tx with SLP

## 2019-06-06 NOTE — PROGRESS NOTE ADULT - SUBJECTIVE AND OBJECTIVE BOX
Patient is a 73y old  Male who presents with a chief complaint of fever, falls (05 Jun 2019 20:23)  Patient seen and examined at bedside.  Pt. c/o rash on back and trouble sleeping; "beds are terrible".    ALLERGIES:  Allergy Status Unknown  No Known Allergies    MEDICATIONS  (STANDING):  amLODIPine   Tablet 5 milliGRAM(s) Oral daily  finasteride 5 milliGRAM(s) Oral daily  folic acid 1 milliGRAM(s) Oral daily  hydrocortisone 1% Ointment 1 Application(s) Topical two times a day  levETIRAcetam 500 milliGRAM(s) Oral two times a day  meclizine 25 milliGRAM(s) Oral three times a day  tamsulosin 0.4 milliGRAM(s) Oral at bedtime  thiamine 100 milliGRAM(s) Oral daily    MEDICATIONS  (PRN):  acetaminophen   Tablet .. 650 milliGRAM(s) Oral every 6 hours PRN Temp greater or equal to 38C (100.4F), Mild Pain (1 - 3)  LORazepam     Tablet 0.5 milliGRAM(s) Oral at bedtime PRN Agitation    Vital Signs Last 24 Hrs  T(F): 98.5 (06 Jun 2019 09:06), Max: 98.5 (05 Jun 2019 15:52)  HR: 70 (06 Jun 2019 09:06) (69 - 86)  BP: 117/69 (06 Jun 2019 09:06) (115/58 - 133/65)  RR: 15 (06 Jun 2019 09:06) (15 - 16)  SpO2: 97% (06 Jun 2019 09:06) (96% - 97%)  I&O's Summary    05 Jun 2019 07:01  -  06 Jun 2019 07:00  --------------------------------------------------------  IN: 120 mL / OUT: 501 mL / NET: -381 mL      PHYSICAL EXAM:  General: NAD, A/O x 3  ENT: MMM  Neck: Supple, No JVD  Lungs: Clear to auscultation bilaterally  Cardio: RRR, S1/S2, No murmurs  Back:  +papular rash diffusely  Abdomen: Soft, Nontender, Nondistended; Bowel sounds present  Extremities: No calf tenderness, No pitting edema  Neuro;  no focal deficits    LABS:                        12.9   8.78  )-----------( 227      ( 06 Jun 2019 06:00 )             38.1     06-06    135  |  100  |  28  ----------------------------<  113  4.2   |  27  |  1.09    Ca    9.1      06 Jun 2019 06:00      eGFR if African American: 78 mL/min/1.73M2 (06-06-19 @ 06:00)  eGFR if Non African American: 68 mL/min/1.73M2 (06-06-19 @ 06:00)              TSH 1.44   TSH with FT4 reflex --  Total T3 --        CAPILLARY BLOOD GLUCOSE        06-04 [id number] 5.6        Culture - Blood (collected 01 Jun 2019 15:25)  Source: .Blood Blood-Venous  Preliminary Report (03 Jun 2019 02:01):    No growth to date.    Culture - Blood (collected 01 Jun 2019 15:25)  Source: .Blood Blood-Peripheral  Preliminary Report (03 Jun 2019 02:01):    No growth to date.    Culture - Urine (collected 01 Jun 2019 15:25)  Source: .Urine Clean Catch (Midstream)  Final Report (02 Jun 2019 18:23):    <10,000 CFU/mL Normal Urogenital Judy        RADIOLOGY & ADDITIONAL TESTS:    Care Discussed with Consultants/Other Providers:

## 2019-06-06 NOTE — PROGRESS NOTE ADULT - SUBJECTIVE AND OBJECTIVE BOX
No distress    Vital Signs Last 24 Hrs  T(C): 36.8 (06-06-19 @ 21:52), Max: 36.9 (06-06-19 @ 09:06)  T(F): 98.3 (06-06-19 @ 21:52), Max: 98.5 (06-06-19 @ 09:06)  HR: 68 (06-06-19 @ 21:52) (68 - 88)  BP: 122/64 (06-06-19 @ 21:52) (117/69 - 144/68)  RR: 15 (06-06-19 @ 21:52) (15 - 16)  SpO2: 99% (06-06-19 @ 21:52) (97% - 99%)    Lungs clear to auscultation  Card S12S2  Abdomen soft, NT, ND  Extr no edema                                       12.9   8.78  )-----------( 227      ( 06 Jun 2019 06:00 )             38.1     06 Jun 2019 06:00    135    |  100    |  28     ----------------------------<  113    4.2     |  27     |  1.09     Ca    9.1        06 Jun 2019 06:00    acetaminophen   Tablet .. 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

## 2019-06-06 NOTE — PROGRESS NOTE ADULT - PROBLEM SELECTOR PLAN 1
suspect secondary to hyponatremia and recent SDH  CT head with no changes  Monitored on tele w no acute events; Trops negative  Neuro consult-Dr Leiva-EEG with no focal findings

## 2019-06-06 NOTE — PROGRESS NOTE ADULT - ASSESSMENT
73M PMH alcohol use s/p recent fall found to have bilateral SDH, discharged home 3 days ago from [facility] presents for syncope, likely secondary to hyponatremia due to SIADH in setting of recent SDH.   Pt. was seen by PM&R; accepted to Acute Rehab; awaiting Insurance Auth.

## 2019-06-06 NOTE — SPEECH LANGUAGE PATHOLOGY EVALUATION - SLP PERTINENT HISTORY OF CURRENT PROBLEM
73M PMH alcohol abuse, recent fall with bilateral SDH (nonsurgical tx) presents for fever and syncope.  Patient was discharge from [facility] on 5/30/19 after a fall at a party/alcohol use.  He was found to have bilateral SDH and evaluated by neurosurgery and neurology.  He did not undergo any surgical intervention and was started on keppra to prevent seizures.   He feels okay. Denies chest pain, sob, nausea, vomiting, diarrhea, headache.

## 2019-06-07 ENCOUNTER — INPATIENT (INPATIENT)
Facility: HOSPITAL | Age: 74
LOS: 7 days | Discharge: HOME | DRG: 949 | End: 2019-06-15
Attending: STUDENT IN AN ORGANIZED HEALTH CARE EDUCATION/TRAINING PROGRAM | Admitting: STUDENT IN AN ORGANIZED HEALTH CARE EDUCATION/TRAINING PROGRAM
Payer: COMMERCIAL

## 2019-06-07 ENCOUNTER — TRANSCRIPTION ENCOUNTER (OUTPATIENT)
Age: 74
End: 2019-06-07

## 2019-06-07 VITALS
RESPIRATION RATE: 15 BRPM | OXYGEN SATURATION: 99 % | TEMPERATURE: 98 F | DIASTOLIC BLOOD PRESSURE: 62 MMHG | SYSTOLIC BLOOD PRESSURE: 137 MMHG | HEART RATE: 72 BPM

## 2019-06-07 VITALS
RESPIRATION RATE: 14 BRPM | SYSTOLIC BLOOD PRESSURE: 121 MMHG | DIASTOLIC BLOOD PRESSURE: 65 MMHG | TEMPERATURE: 98 F | HEIGHT: 67 IN | OXYGEN SATURATION: 96 % | WEIGHT: 140.43 LBS | HEART RATE: 75 BPM

## 2019-06-07 DIAGNOSIS — I62.00 NONTRAUMATIC SUBDURAL HEMORRHAGE, UNSPECIFIED: ICD-10-CM

## 2019-06-07 LAB
ANION GAP SERPL CALC-SCNC: 6 MMOL/L — SIGNIFICANT CHANGE UP (ref 5–17)
BUN SERPL-MCNC: 30 MG/DL — HIGH (ref 7–23)
CALCIUM SERPL-MCNC: 9.1 MG/DL — SIGNIFICANT CHANGE UP (ref 8.4–10.5)
CHLORIDE SERPL-SCNC: 100 MMOL/L — SIGNIFICANT CHANGE UP (ref 96–108)
CO2 SERPL-SCNC: 29 MMOL/L — SIGNIFICANT CHANGE UP (ref 22–31)
CREAT SERPL-MCNC: 1.08 MG/DL — SIGNIFICANT CHANGE UP (ref 0.5–1.3)
CULTURE RESULTS: SIGNIFICANT CHANGE UP
CULTURE RESULTS: SIGNIFICANT CHANGE UP
GLUCOSE SERPL-MCNC: 115 MG/DL — HIGH (ref 70–99)
POTASSIUM SERPL-MCNC: 4.2 MMOL/L — SIGNIFICANT CHANGE UP (ref 3.5–5.3)
POTASSIUM SERPL-SCNC: 4.2 MMOL/L — SIGNIFICANT CHANGE UP (ref 3.5–5.3)
SODIUM SERPL-SCNC: 135 MMOL/L — SIGNIFICANT CHANGE UP (ref 135–145)
SPECIMEN SOURCE: SIGNIFICANT CHANGE UP
SPECIMEN SOURCE: SIGNIFICANT CHANGE UP

## 2019-06-07 PROCEDURE — 93306 TTE W/DOPPLER COMPLETE: CPT

## 2019-06-07 PROCEDURE — 70450 CT HEAD/BRAIN W/O DYE: CPT

## 2019-06-07 PROCEDURE — 92523 SPEECH SOUND LANG COMPREHEN: CPT

## 2019-06-07 PROCEDURE — 84145 PROCALCITONIN (PCT): CPT

## 2019-06-07 PROCEDURE — 99223 1ST HOSP IP/OBS HIGH 75: CPT

## 2019-06-07 PROCEDURE — 87631 RESP VIRUS 3-5 TARGETS: CPT

## 2019-06-07 PROCEDURE — 83036 HEMOGLOBIN GLYCOSYLATED A1C: CPT

## 2019-06-07 PROCEDURE — 71045 X-RAY EXAM CHEST 1 VIEW: CPT

## 2019-06-07 PROCEDURE — 82436 ASSAY OF URINE CHLORIDE: CPT

## 2019-06-07 PROCEDURE — 84443 ASSAY THYROID STIM HORMONE: CPT

## 2019-06-07 PROCEDURE — 81001 URINALYSIS AUTO W/SCOPE: CPT

## 2019-06-07 PROCEDURE — 95812 EEG 41-60 MINUTES: CPT

## 2019-06-07 PROCEDURE — 99239 HOSP IP/OBS DSCHRG MGMT >30: CPT

## 2019-06-07 PROCEDURE — 85610 PROTHROMBIN TIME: CPT

## 2019-06-07 PROCEDURE — 96361 HYDRATE IV INFUSION ADD-ON: CPT

## 2019-06-07 PROCEDURE — 97162 PT EVAL MOD COMPLEX 30 MIN: CPT

## 2019-06-07 PROCEDURE — 87633 RESP VIRUS 12-25 TARGETS: CPT

## 2019-06-07 PROCEDURE — 82962 GLUCOSE BLOOD TEST: CPT

## 2019-06-07 PROCEDURE — 84484 ASSAY OF TROPONIN QUANT: CPT

## 2019-06-07 PROCEDURE — 83935 ASSAY OF URINE OSMOLALITY: CPT

## 2019-06-07 PROCEDURE — 87581 M.PNEUMON DNA AMP PROBE: CPT

## 2019-06-07 PROCEDURE — 85027 COMPLETE CBC AUTOMATED: CPT

## 2019-06-07 PROCEDURE — 82550 ASSAY OF CK (CPK): CPT

## 2019-06-07 PROCEDURE — 99285 EMERGENCY DEPT VISIT HI MDM: CPT | Mod: 25

## 2019-06-07 PROCEDURE — 87486 CHLMYD PNEUM DNA AMP PROBE: CPT

## 2019-06-07 PROCEDURE — 80076 HEPATIC FUNCTION PANEL: CPT

## 2019-06-07 PROCEDURE — 80053 COMPREHEN METABOLIC PANEL: CPT

## 2019-06-07 PROCEDURE — 93005 ELECTROCARDIOGRAM TRACING: CPT

## 2019-06-07 PROCEDURE — 84300 ASSAY OF URINE SODIUM: CPT

## 2019-06-07 PROCEDURE — 84550 ASSAY OF BLOOD/URIC ACID: CPT

## 2019-06-07 PROCEDURE — 97116 GAIT TRAINING THERAPY: CPT

## 2019-06-07 PROCEDURE — 96365 THER/PROPH/DIAG IV INF INIT: CPT

## 2019-06-07 PROCEDURE — 96374 THER/PROPH/DIAG INJ IV PUSH: CPT

## 2019-06-07 PROCEDURE — 97166 OT EVAL MOD COMPLEX 45 MIN: CPT

## 2019-06-07 PROCEDURE — 76700 US EXAM ABDOM COMPLETE: CPT

## 2019-06-07 PROCEDURE — 99284 EMERGENCY DEPT VISIT MOD MDM: CPT | Mod: 25

## 2019-06-07 PROCEDURE — 36415 COLL VENOUS BLD VENIPUNCTURE: CPT

## 2019-06-07 PROCEDURE — 87798 DETECT AGENT NOS DNA AMP: CPT

## 2019-06-07 PROCEDURE — 87040 BLOOD CULTURE FOR BACTERIA: CPT

## 2019-06-07 PROCEDURE — 87449 NOS EACH ORGANISM AG IA: CPT

## 2019-06-07 PROCEDURE — 85730 THROMBOPLASTIN TIME PARTIAL: CPT

## 2019-06-07 PROCEDURE — 83605 ASSAY OF LACTIC ACID: CPT

## 2019-06-07 PROCEDURE — 80307 DRUG TEST PRSMV CHEM ANLYZR: CPT

## 2019-06-07 PROCEDURE — 87086 URINE CULTURE/COLONY COUNT: CPT

## 2019-06-07 PROCEDURE — 84100 ASSAY OF PHOSPHORUS: CPT

## 2019-06-07 PROCEDURE — 80048 BASIC METABOLIC PNL TOTAL CA: CPT

## 2019-06-07 RX ORDER — AMLODIPINE BESYLATE 2.5 MG/1
1 TABLET ORAL
Qty: 0 | Refills: 0 | DISCHARGE
Start: 2019-06-07

## 2019-06-07 RX ORDER — FINASTERIDE 5 MG/1
5 TABLET, FILM COATED ORAL DAILY
Refills: 0 | Status: DISCONTINUED | OUTPATIENT
Start: 2019-06-07 | End: 2019-06-15

## 2019-06-07 RX ORDER — MECLIZINE HCL 12.5 MG
25 TABLET ORAL THREE TIMES A DAY
Refills: 0 | Status: DISCONTINUED | OUTPATIENT
Start: 2019-06-07 | End: 2019-06-13

## 2019-06-07 RX ORDER — THIAMINE MONONITRATE (VIT B1) 100 MG
100 TABLET ORAL DAILY
Refills: 0 | Status: DISCONTINUED | OUTPATIENT
Start: 2019-06-07 | End: 2019-06-15

## 2019-06-07 RX ORDER — HYDROCORTISONE 1 %
1 OINTMENT (GRAM) TOPICAL
Qty: 0 | Refills: 0 | DISCHARGE
Start: 2019-06-07

## 2019-06-07 RX ORDER — TAMSULOSIN HYDROCHLORIDE 0.4 MG/1
0.4 CAPSULE ORAL AT BEDTIME
Refills: 0 | Status: DISCONTINUED | OUTPATIENT
Start: 2019-06-07 | End: 2019-06-14

## 2019-06-07 RX ORDER — RAMELTEON 8 MG
4 TABLET ORAL AT BEDTIME
Refills: 0 | Status: DISCONTINUED | OUTPATIENT
Start: 2019-06-07 | End: 2019-06-12

## 2019-06-07 RX ORDER — FINASTERIDE 5 MG/1
1 TABLET, FILM COATED ORAL
Qty: 0 | Refills: 0 | DISCHARGE
Start: 2019-06-07

## 2019-06-07 RX ORDER — ACETAMINOPHEN 500 MG
2 TABLET ORAL
Qty: 0 | Refills: 0 | DISCHARGE
Start: 2019-06-07

## 2019-06-07 RX ORDER — LEVETIRACETAM 250 MG/1
500 TABLET, FILM COATED ORAL
Refills: 0 | Status: DISCONTINUED | OUTPATIENT
Start: 2019-06-07 | End: 2019-06-15

## 2019-06-07 RX ORDER — ACETAMINOPHEN 500 MG
650 TABLET ORAL EVERY 6 HOURS
Refills: 0 | Status: DISCONTINUED | OUTPATIENT
Start: 2019-06-07 | End: 2019-06-08

## 2019-06-07 RX ORDER — MECLIZINE HCL 12.5 MG
1 TABLET ORAL
Qty: 0 | Refills: 0 | DISCHARGE
Start: 2019-06-07

## 2019-06-07 RX ORDER — AMLODIPINE BESYLATE 2.5 MG/1
5 TABLET ORAL DAILY
Refills: 0 | Status: DISCONTINUED | OUTPATIENT
Start: 2019-06-07 | End: 2019-06-15

## 2019-06-07 RX ORDER — FINASTERIDE 5 MG/1
1 TABLET, FILM COATED ORAL
Qty: 0 | Refills: 0 | DISCHARGE

## 2019-06-07 RX ORDER — HYDROCORTISONE 1 %
1 OINTMENT (GRAM) TOPICAL
Refills: 0 | Status: DISCONTINUED | OUTPATIENT
Start: 2019-06-07 | End: 2019-06-15

## 2019-06-07 RX ORDER — FOLIC ACID 0.8 MG
1 TABLET ORAL DAILY
Refills: 0 | Status: DISCONTINUED | OUTPATIENT
Start: 2019-06-07 | End: 2019-06-15

## 2019-06-07 RX ORDER — THIAMINE MONONITRATE (VIT B1) 100 MG
1 TABLET ORAL
Qty: 0 | Refills: 0 | DISCHARGE
Start: 2019-06-07

## 2019-06-07 RX ADMIN — Medication 1 MILLIGRAM(S): at 11:45

## 2019-06-07 RX ADMIN — LEVETIRACETAM 500 MILLIGRAM(S): 250 TABLET, FILM COATED ORAL at 05:19

## 2019-06-07 RX ADMIN — Medication 1 APPLICATION(S): at 05:20

## 2019-06-07 RX ADMIN — Medication 100 MILLIGRAM(S): at 11:45

## 2019-06-07 RX ADMIN — FINASTERIDE 5 MILLIGRAM(S): 5 TABLET, FILM COATED ORAL at 11:45

## 2019-06-07 RX ADMIN — LEVETIRACETAM 500 MILLIGRAM(S): 250 TABLET, FILM COATED ORAL at 17:30

## 2019-06-07 RX ADMIN — Medication 1 APPLICATION(S): at 17:31

## 2019-06-07 RX ADMIN — Medication 0.5 MILLIGRAM(S): at 23:03

## 2019-06-07 RX ADMIN — Medication 4 MILLIGRAM(S): at 21:26

## 2019-06-07 RX ADMIN — AMLODIPINE BESYLATE 5 MILLIGRAM(S): 2.5 TABLET ORAL at 05:19

## 2019-06-07 RX ADMIN — Medication 650 MILLIGRAM(S): at 23:00

## 2019-06-07 RX ADMIN — TAMSULOSIN HYDROCHLORIDE 0.4 MILLIGRAM(S): 0.4 CAPSULE ORAL at 21:26

## 2019-06-07 RX ADMIN — Medication 25 MILLIGRAM(S): at 05:19

## 2019-06-07 NOTE — PROGRESS NOTE ADULT - PROBLEM SELECTOR PLAN 3
stable - no changes on CT head

## 2019-06-07 NOTE — PROGRESS NOTE ADULT - ASSESSMENT
73M PMH alcohol use s/p recent fall found to have bilateral SDH, discharged home 3 days ago from [facility] presents for syncope, likely secondary to hyponatremia due to SIADH in setting of recent SDH.   Pt. was seen by PM&R; accepted to Acute Rehab; awaiting Insurance Auth.

## 2019-06-07 NOTE — H&P ADULT - ASSESSMENT
72 y/o male with an unwitnessed fall suffering bilateral SDH and SAH, managed non-operatively, with subsequent fall and hyponatremia likely secondary to TBI, with mild cognitive and functional deficits.    Insomnia--Trial Rozerem 4mg    -- seizure ppx, --Keppra    HTN--amlodipine    BPH--finasteride, flomax    ETOH dependence--folate, thiamine 72 y/o male with an unwitnessed fall suffering bilateral SDH and SAH, managed non-operatively, with subsequent fall and hyponatremia likely secondary to TBI, with mild cognitive and functional deficits.    Insomnia--Trial Rozerem 4mg    -- seizure ppx, --Keppra    HTN--amlodipine    BPH--finasteride, flomax    ETOH dependence--folate, thiamine        Precautions:                                                                                  Diet:     DVT Prophylaxis:                                                                          Medical Prognosis:     Prescreen Comparison: I have reviewed the prescreen information and I found no relevant changes between the preadmission  screening and my post admission evaluation.     Expected Therapy:   P.T.        hrs/day           O. T.      hrs/day           S.L.P.        hrs/day                    P&O                                                   Excpected Frequency: 5 days/7 day period    Rehab Potential:                                           Estimated Disposition:                          ELOS:              days      Rationale For Inpatient Rehab Admission- Patient demonstrates the following:     [X] Medically appropriate for rehabilitation admission   [X] Has attainable rehab goals with an approrpriate discharge plan  [X] Has rehabilitation potential (expected to make significant improvement within a reasonable period of time)  [X] Requires close medical management by a rehab physician, rehab nursing care and comprehensive interdisciplinary team (including         PT, OT, SLP and/or prosthetics and orthotics) 72 y/o male with an unwitnessed fall suffering bilateral SDH and SAH, managed non-operatively, with subsequent fall and hyponatremia likely secondary to TBI, with mild cognitive and functional deficits.    Insomnia--Trial Rozerem 4mg    -- seizure ppx, --Keppra    HTN--amlodipine    BPH--finasteride, flomax    ETOH dependence--folate, thiamine        Precautions:       fall, seizure                                                                           Diet: regular    DVT Prophylaxis:        SCDs                                                                  Medical Prognosis: good    Prescreen Comparison: I have reviewed the prescreen information and I found no relevant changes between the preadmission  screening and my post admission evaluation.     Expected Therapy:   P.T.        hrs/day           O. T.      hrs/day           S.L.P.        hrs/day                    P&O                                                   Excpected Frequency: 5 days/7 day period    Rehab Potential:                                           Estimated Disposition:                          ELOS:              days      Rationale For Inpatient Rehab Admission- Patient demonstrates the following:     [X] Medically appropriate for rehabilitation admission   [X] Has attainable rehab goals with an approrpriate discharge plan  [X] Has rehabilitation potential (expected to make significant improvement within a reasonable period of time)  [X] Requires close medical management by a rehab physician, rehab nursing care and comprehensive interdisciplinary team (including         PT, OT, SLP and/or prosthetics and orthotics)

## 2019-06-07 NOTE — PROGRESS NOTE ADULT - ATTENDING COMMENTS
I have personally seen and examined patient on the above date.  I discussed the case with REX Ernst and I agree with findings and plan as detailed per note above, which I have amended where appropriate.    stable for discharge to Acute Rehab
I have personally seen and examined patient on the above date.  I discussed the case with REX Ernst and I agree with findings and plan as detailed per note above, which I have amended where appropriate.      Improved hyponatremia after tolvaptan  - awaiting insurance authorization for acute rehab
I have personally seen and examined patient on the above date.  I discussed the case with REX Llanos and I agree with findings and plan as detailed per note above, which I have amended where appropriate.      Neurology suggested EEG to rule out seizure  sodium improving
I have personally seen and examined patient on the above date.  I discussed the case with REX Llanos and I agree with findings and plan as detailed per note above, which I have amended where appropriate.      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
I have personally seen and examined patient on the above date.  I discussed the case with REX Llanos and I agree with findings and plan as detailed per note above, which I have amended where appropriate.      Hyponatremia  metabolic encephalopathy  ruled out breakthrough seizure  s/p recent SDH   - awaiting Acute rehab placement/authorization

## 2019-06-07 NOTE — H&P ADULT - NSHPREVIEWOFSYSTEMS_GEN_ALL_CORE
REVIEW OF SYSTEMS:  CONSTITUTIONAL: No fever, weight loss, or fatigue    EYES: + difficulty reading, No eye pain, , or discharge  ENMT:  No difficulty hearing, tinnitus, vertigo; No sinus or throat pain  NECK: No pain or stiffness    RESPIRATORY: No cough, wheezing, chills or hemoptysis; No shortness of breath    CARDIOVASCULAR: No chest pain, palpitations, dizziness, or leg swelling  GASTROINTESTINAL: No abdominal or epigastric pain. No nausea, vomiting, or hematemesis; No diarrhea or constipation. No melena or hematochezia.  GENITOURINARY: No dysuria, frequency, hematuria, or incontinence  NEUROLOGICAL: +occasional  headaches --controlled with meds, No memory loss, loss of strength, numbness, or tremors,  no dizziness.   SKIN: No itching, burning, rashes, or lesions   LYMPH NODES: No enlarged glands  ENDOCRINE: No heat or cold intolerance; No hair loss  MUSCULOSKELETAL: No joint pain or swelling; No muscle, back, or extremity pain  PSYCHIATRIC: No depression, anxiety, mood swings, +difficulty sleeping  HEME/LYMPH: No easy bruising, or bleeding gums  ALLERY AND IMMUNOLOGIC: No hives or eczema

## 2019-06-07 NOTE — H&P ADULT - HISTORY OF PRESENT ILLNESS
73 year old male with PMHx of BPH who was transferred from [facility] to [facility] on 5/28/19 after suffering a fall 2 days prior to admission. Patient reports that he was home and drinking. States that he was home alone and that his wife was on vacation. He remembers seeing the evidence of multiple falls at home with blood on the floor in a couple of areas. Patient states he does not remember when or how he fell. Wife reports that he was to pick her up from the airport the day after the fall/falls occurred and a neighbor decided that he was unsafe to drive.   Once his wife returned home, she brought him to the ER at [facility]. CT of the head showed acute bilateral SDH and SAH (bilateral SDH, SAH, tentorial hemorrhage and left occipital hemorrhage/contusion).  At [facility], he was also noted to have a seizure and received Keppra and Ativan. Patient was then transferred to [facility] for further evaluation. Patient did not need surgical intervention and was discharged home on 5/30/19. Keppra was continued to prevent further seizures.    Patient was readmitted to [facility] on 6/1/19. Wife reports that patient was found to be lethargic at home and had difficulty ambulating. Daughter came and had to "drag" him out to the car b/c he was unable to walk. Repeat CT of the head showed stable left occipital hemorrhage with stable small hemorrhages.   Neurology was consulted. EEG done without signs of seizure activity.   Patient found to have hyponatremia. Nephrology consulted. Will likely order tolvaptan.   PT consulted and found patient to need min A for ambulation yesterday with rolling walker.   OT consulted and found patient ot be supervision to  for ADLs.    Admitted to rehab 6/7/19.

## 2019-06-07 NOTE — PROGRESS NOTE ADULT - PROBLEM SELECTOR PROBLEM 3
Subdural hematoma

## 2019-06-07 NOTE — DISCHARGE NOTE PROVIDER - CARE PROVIDERS DIRECT ADDRESSES
[email or web address]

## 2019-06-07 NOTE — H&P ADULT - NSHPPHYSICALEXAM_GEN_ALL_CORE
Constitutional - NAD, Comfortable  HEENT - PERRLA, no nystagmus or  saccades  Chest - CTA throughout   Cardiovascular - All extremities well perfused, S1S2, no murmurs   Abdomen - Soft, Non-distended, +BS   Extremities - no edema   Neurologic Exam -                    Cognitive - Awake, Alert, Oriented to self, place, date, year, situation	     Communication - Fluent, No dysarthria, Naming intact     Attention -  able to recite months of year backwards,  +difficulty with serial 7s     Memory -  able to recall 3/3 items after 3 minutes,      Cranial Nerves - CN 2-12 grossly intact     Motor -                     LEFT    UE - ShAB 5/5, EF 5/5, EE 5/5, WE 5/5,  5/5                    RIGHT UE - ShAB 5/5, EF 5/5, EE 5/5, WE 5/5,  5/5                    LEFT    LE - HF 5/5, KE 5/5, DF 5/5, PF 5/5                    RIGHT LE - HF 5/5, KE 5/5, DF 5/5, PF 5/5        Sensory - Intact to light touch bilat upper and lower extremities      Coordination - Finger-to-nose intact bilaterally, HTS intact bilaterally   Psychiatric -  affect wnl

## 2019-06-07 NOTE — PROGRESS NOTE ADULT - SUBJECTIVE AND OBJECTIVE BOX
No distress    Vital Signs Last 24 Hrs  T(C): 37 (06-07-19 @ 20:31), Max: 37 (06-07-19 @ 20:31)  T(F): 98.6 (06-07-19 @ 20:31), Max: 98.6 (06-07-19 @ 20:31)  HR: 67 (06-07-19 @ 20:31) (67 - 75)  BP: 114/64 (06-07-19 @ 20:31) (114/64 - 139/67)  RR: 12 (06-07-19 @ 20:31) (12 - 15)  SpO2: 96% (06-07-19 @ 20:31) (96% - 99%)    Lungs clear to auscultation  Card S12S2  Abdomen soft, NT, ND  Extr no edema                                                12.9   8.78  )-----------( 227      ( 06 Jun 2019 06:00 )             38.1     07 Jun 2019 05:45    135    |  100    |  30     ----------------------------<  115    4.2     |  29     |  1.08     Ca    9.1        07 Jun 2019 05:45    acetaminophen   Tablet .. 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

## 2019-06-07 NOTE — H&P ADULT - NSHPSOCIALHISTORY_GEN_ALL_CORE
pt lives w/spouse in a multi level house with no stairs to enter. There are 5 stairs down to den and 10 steps up to kitchen area. pt independent w/ambulation and ADL's prior to fall. 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     Smoking - Denied  EtOH - Daily wine/beer  Drugs - Denied

## 2019-06-07 NOTE — DISCHARGE NOTE PROVIDER - NSDCCPCAREPLAN_GEN_ALL_CORE_FT
PRINCIPAL DISCHARGE DIAGNOSIS  Diagnosis: Altered mental status  Assessment and Plan of Treatment:       SECONDARY DISCHARGE DIAGNOSES  Diagnosis: Hyponatremia  Assessment and Plan of Treatment:

## 2019-06-07 NOTE — DISCHARGE NOTE PROVIDER - HOSPITAL COURSE
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   Pt. seen by Nephrology; given 1 dose of Tolvaptan; Na normalized.    Cardiac w/u negative.        < from: CT Brain Stroke Protocol (06.01.19 @ 14:27) >            IMPRESSION: Stable left occipital hemorrhage with stable small     hemorrhages.        < end of copied text >            Pt. was seen by PM&R; accepted to Acute Rehab.

## 2019-06-07 NOTE — PROGRESS NOTE ADULT - PROVIDER SPECIALTY LIST ADULT
Hospitalist
Nephrology
Nephrology
Rehab Medicine
Nephrology
Hospitalist

## 2019-06-07 NOTE — DISCHARGE NOTE NURSING/CASE MANAGEMENT/SOCIAL WORK - NSDCDPATPORTLINK_GEN_ALL_CORE
You can access the [facility] Patient Portal, offered by [facility], by registering with the following website: [email or web address]

## 2019-06-07 NOTE — H&P ADULT - ATTENDING COMMENTS
Pt. seen with NP.  Agree with documentation above as per NP with amendments made as appropriate. Patient medically stable. Making progress towards rehab goals.

## 2019-06-07 NOTE — PROGRESS NOTE ADULT - PROBLEM SELECTOR PLAN 4
cont flomax/proscar

## 2019-06-07 NOTE — PROGRESS NOTE ADULT - REASON FOR ADMISSION
fever, falls

## 2019-06-07 NOTE — DISCHARGE NOTE PROVIDER - CARE PROVIDER_API CALL
Gin Fontaine)  Family Medicine  [address] [place] [address], [address]  [place] [address]  Phone: [phone]  Fax: [phone]  Follow Up Time:

## 2019-06-07 NOTE — PROGRESS NOTE ADULT - PROBLEM SELECTOR PROBLEM 4
Benign prostatic hyperplasia, unspecified whether lower urinary tract symptoms present

## 2019-06-07 NOTE — PROGRESS NOTE ADULT - SUBJECTIVE AND OBJECTIVE BOX
Patient is a 73y old  Male who presents with a chief complaint of fever, falls (06 Jun 2019 22:58)  Patient seen and examined at bedside.  Pt. c/o "bad night sleeping because of the bed".  He states his headache went away.    ALLERGIES:  Allergy Status Unknown  No Known Allergies    MEDICATIONS  (STANDING):  amLODIPine   Tablet 5 milliGRAM(s) Oral daily  finasteride 5 milliGRAM(s) Oral daily  folic acid 1 milliGRAM(s) Oral daily  hydrocortisone 1% Ointment 1 Application(s) Topical two times a day  levETIRAcetam 500 milliGRAM(s) Oral two times a day  meclizine 25 milliGRAM(s) Oral three times a day  tamsulosin 0.4 milliGRAM(s) Oral at bedtime  thiamine 100 milliGRAM(s) Oral daily    MEDICATIONS  (PRN):  acetaminophen   Tablet .. 650 milliGRAM(s) Oral every 6 hours PRN Temp greater or equal to 38C (100.4F), Mild Pain (1 - 3)  LORazepam     Tablet 0.5 milliGRAM(s) Oral at bedtime PRN Agitation    Vital Signs Last 24 Hrs  T(F): 98 (07 Jun 2019 09:00), Max: 98.3 (06 Jun 2019 21:52)  HR: 72 (07 Jun 2019 09:00) (68 - 88)  BP: 137/62 (07 Jun 2019 09:00) (122/64 - 144/68)  RR: 15 (07 Jun 2019 09:00) (15 - 16)  SpO2: 99% (07 Jun 2019 09:00) (99% - 99%)  I&O's Summary    06 Jun 2019 07:01  -  07 Jun 2019 07:00  --------------------------------------------------------  IN: 480 mL / OUT: 500 mL / NET: -20 mL      PHYSICAL EXAM:  General: NAD, A/O x 3  ENT: MMM  Neck: Supple, No JVD  Lungs: Clear to auscultation bilaterally  Cardio: RRR, S1/S2, No murmurs  Abdomen: Soft, Nontender, Nondistended; Bowel sounds present  Extremities: No calf tenderness, No pitting edema  Neuro:  nonfocal    LABS:                        12.9   8.78  )-----------( 227      ( 06 Jun 2019 06:00 )             38.1     06-07    135  |  100  |  30  ----------------------------<  115  4.2   |  29  |  1.08    Ca    9.1      07 Jun 2019 05:45      eGFR if Non African American: 68 mL/min/1.73M2 (06-07-19 @ 05:45)  eGFR if : 79 mL/min/1.73M2 (06-07-19 @ 05:45)      CAPILLARY BLOOD GLUCOSE        06-04 [id number] 5.6          RADIOLOGY & ADDITIONAL TESTS:    Care Discussed with Consultants/Other Providers:

## 2019-06-08 LAB
ALBUMIN SERPL ELPH-MCNC: 3.4 G/DL — SIGNIFICANT CHANGE UP (ref 3.3–5)
ALP SERPL-CCNC: 139 U/L — HIGH (ref 40–120)
ALT FLD-CCNC: 73 U/L DA — HIGH (ref 10–45)
ANION GAP SERPL CALC-SCNC: 7 MMOL/L — SIGNIFICANT CHANGE UP (ref 5–17)
AST SERPL-CCNC: 25 U/L — SIGNIFICANT CHANGE UP (ref 10–40)
BASOPHILS # BLD AUTO: 0.07 K/UL — SIGNIFICANT CHANGE UP (ref 0–0.2)
BASOPHILS NFR BLD AUTO: 0.8 % — SIGNIFICANT CHANGE UP (ref 0–2)
BILIRUB SERPL-MCNC: 1.3 MG/DL — HIGH (ref 0.2–1.2)
BUN SERPL-MCNC: 24 MG/DL — HIGH (ref 7–23)
CALCIUM SERPL-MCNC: 9 MG/DL — SIGNIFICANT CHANGE UP (ref 8.4–10.5)
CHLORIDE SERPL-SCNC: 99 MMOL/L — SIGNIFICANT CHANGE UP (ref 96–108)
CO2 SERPL-SCNC: 28 MMOL/L — SIGNIFICANT CHANGE UP (ref 22–31)
CREAT SERPL-MCNC: 1.02 MG/DL — SIGNIFICANT CHANGE UP (ref 0.5–1.3)
EOSINOPHIL # BLD AUTO: 0.16 K/UL — SIGNIFICANT CHANGE UP (ref 0–0.5)
EOSINOPHIL NFR BLD AUTO: 1.9 % — SIGNIFICANT CHANGE UP (ref 0–6)
GLUCOSE SERPL-MCNC: 111 MG/DL — HIGH (ref 70–99)
HCT VFR BLD CALC: 36.4 % — LOW (ref 39–50)
HGB BLD-MCNC: 12.2 G/DL — LOW (ref 13–17)
IMM GRANULOCYTES NFR BLD AUTO: 0.4 % — SIGNIFICANT CHANGE UP (ref 0–1.5)
LYMPHOCYTES # BLD AUTO: 1.85 K/UL — SIGNIFICANT CHANGE UP (ref 1–3.3)
LYMPHOCYTES # BLD AUTO: 22.2 % — SIGNIFICANT CHANGE UP (ref 13–44)
MCHC RBC-ENTMCNC: 28.8 PG — SIGNIFICANT CHANGE UP (ref 27–34)
MCHC RBC-ENTMCNC: 33.5 GM/DL — SIGNIFICANT CHANGE UP (ref 32–36)
MCV RBC AUTO: 86.1 FL — SIGNIFICANT CHANGE UP (ref 80–100)
MONOCYTES # BLD AUTO: 0.96 K/UL — HIGH (ref 0–0.9)
MONOCYTES NFR BLD AUTO: 11.5 % — SIGNIFICANT CHANGE UP (ref 2–14)
NEUTROPHILS # BLD AUTO: 5.27 K/UL — SIGNIFICANT CHANGE UP (ref 1.8–7.4)
NEUTROPHILS NFR BLD AUTO: 63.2 % — SIGNIFICANT CHANGE UP (ref 43–77)
NRBC # BLD: 0 /100 WBCS — SIGNIFICANT CHANGE UP (ref 0–0)
PLATELET # BLD AUTO: 218 K/UL — SIGNIFICANT CHANGE UP (ref 150–400)
POTASSIUM SERPL-MCNC: 4.4 MMOL/L — SIGNIFICANT CHANGE UP (ref 3.5–5.3)
POTASSIUM SERPL-SCNC: 4.4 MMOL/L — SIGNIFICANT CHANGE UP (ref 3.5–5.3)
PROT SERPL-MCNC: 7.2 G/DL — SIGNIFICANT CHANGE UP (ref 6–8.3)
RBC # BLD: 4.23 M/UL — SIGNIFICANT CHANGE UP (ref 4.2–5.8)
RBC # FLD: 13.3 % — SIGNIFICANT CHANGE UP (ref 10.3–14.5)
SODIUM SERPL-SCNC: 134 MMOL/L — LOW (ref 135–145)
WBC # BLD: 8.34 K/UL — SIGNIFICANT CHANGE UP (ref 3.8–10.5)
WBC # FLD AUTO: 8.34 K/UL — SIGNIFICANT CHANGE UP (ref 3.8–10.5)

## 2019-06-08 PROCEDURE — 99255 IP/OBS CONSLTJ NEW/EST HI 80: CPT

## 2019-06-08 PROCEDURE — 99233 SBSQ HOSP IP/OBS HIGH 50: CPT

## 2019-06-08 RX ORDER — ACETAMINOPHEN 500 MG
650 TABLET ORAL EVERY 6 HOURS
Refills: 0 | Status: DISCONTINUED | OUTPATIENT
Start: 2019-06-08 | End: 2019-06-15

## 2019-06-08 RX ADMIN — Medication 0.5 MILLIGRAM(S): at 21:54

## 2019-06-08 RX ADMIN — Medication 650 MILLIGRAM(S): at 00:14

## 2019-06-08 RX ADMIN — Medication 1 APPLICATION(S): at 06:24

## 2019-06-08 RX ADMIN — FINASTERIDE 5 MILLIGRAM(S): 5 TABLET, FILM COATED ORAL at 12:06

## 2019-06-08 RX ADMIN — LEVETIRACETAM 500 MILLIGRAM(S): 250 TABLET, FILM COATED ORAL at 18:23

## 2019-06-08 RX ADMIN — Medication 1 MILLIGRAM(S): at 12:06

## 2019-06-08 RX ADMIN — Medication 650 MILLIGRAM(S): at 22:12

## 2019-06-08 RX ADMIN — AMLODIPINE BESYLATE 5 MILLIGRAM(S): 2.5 TABLET ORAL at 06:22

## 2019-06-08 RX ADMIN — Medication 650 MILLIGRAM(S): at 22:50

## 2019-06-08 RX ADMIN — TAMSULOSIN HYDROCHLORIDE 0.4 MILLIGRAM(S): 0.4 CAPSULE ORAL at 21:54

## 2019-06-08 RX ADMIN — Medication 650 MILLIGRAM(S): at 06:22

## 2019-06-08 RX ADMIN — LEVETIRACETAM 500 MILLIGRAM(S): 250 TABLET, FILM COATED ORAL at 06:22

## 2019-06-08 RX ADMIN — Medication 100 MILLIGRAM(S): at 12:06

## 2019-06-08 RX ADMIN — Medication 4 MILLIGRAM(S): at 21:54

## 2019-06-08 NOTE — PROGRESS NOTE ADULT - SUBJECTIVE AND OBJECTIVE BOX
History of Present Illness:  Reason for Admission: SDH	  History of Present Illness: 	  73 year old male with PMHx of BPH who was transferred from [facility] to [facility] on 5/28/19 after suffering a fall 2 days prior to admission. Patient reports that he was home and drinking. States that he was home alone and that his wife was on vacation. He remembers seeing the evidence of multiple falls at home with blood on the floor in a couple of areas. Patient states he does not remember when or how he fell. Wife reports that he was to pick her up from the airport the day after the fall/falls occurred and a neighbor decided that he was unsafe to drive.   Once his wife returned home, she brought him to the ER at [facility]. CT of the head showed acute bilateral SDH and SAH (bilateral SDH, SAH, tentorial hemorrhage and left occipital hemorrhage/contusion).  At [facility], he was also noted to have a seizure and received Keppra and Ativan. Patient was then transferred to [facility] for further evaluation. Patient did not need surgical intervention and was discharged home on 5/30/19. Keppra was continued to prevent further seizures.    Patient was readmitted to [facility] on 6/1/19. Wife reports that patient was found to be lethargic at home and had difficulty ambulating. Daughter came and had to "drag" him out to the car b/c he was unable to walk. Repeat CT of the head showed stable left occipital hemorrhage with stable small hemorrhages.   Neurology was consulted. EEG done without signs of seizure activity.   Patient found to have hyponatremia. Nephrology consulted. Will likely order tolvaptan.   PT consulted and found patient to need min A for ambulation yesterday with rolling walker.   OT consulted and found patient ot be supervision to  for ADLs.    Admitted to rehab 6/7/19.        Review of Systems:  Review of Systems: REVIEW OF SYSTEMS:  CONSTITUTIONAL: No fever or fatigue  NECK: No pain or stiffness  RESPIRATORY: No cough, chills; No shortness of breath   CARDIOVASCULAR: No chest pain or leg swelling  GASTROINTESTINAL: No abdominal or epigastric pain. No nausea, vomiting  GENITOURINARY: No dysuria  NEUROLOGICAL: no dizziness.   SKIN: No rashes, or lesions   MUSCULOSKELETAL: No joint pain or swelling  PSYCHIATRIC: No depression, anxiety 	      Allergies and Intolerances:        Allergies:  	No Known Allergies:         Patient History:    Past Medical, Past Surgical, and Family History:  PAST MEDICAL HISTORY:  BPH (benign prostatic hyperplasia)     ICH (intracerebral hemorrhage).     PAST SURGICAL HISTORY:  No significant past surgical history.           Physical Exam:  Physical Exam: Constitutional - NAD, Comfortable  HEENT - AT/NC, no nystagmus   Chest - CTA throughout   Cardiovascular - All extremities well perfused, S1S2, no murmurs   Abdomen - Soft, Non-distended, +BS   Extremities - no edema   Neurologic Exam -                    Cognitive - Awake, Alert, Oriented to self, place, date, year, situation      Communication - Fluent          Motor -                     LEFT    UE - ShAB 5/5, EF 5/5, EE 5/5, WE 5/5,  5/5                    RIGHT UE - ShAB 5/5, EF 5/5, EE 5/5, WE 5/5,  5/5                    LEFT    LE - HF 5/5, KE 5/5, DF 5/5, PF 5/5                    RIGHT LE - HF 5/5, KE 5/5, DF 5/5, PF 5/5        Sensory - Intact to light touch bilat upper and lower extremities      Coordination - Finger-to-nose intact bilaterally, HTS intact bilaterally  Psychiatric -  affect wnl

## 2019-06-08 NOTE — PROGRESS NOTE ADULT - ASSESSMENT
72 y/o male with an unwitnessed fall suffering bilateral SDH and SAH, managed non-operatively, with subsequent fall and hyponatremia likely secondary to TBI, with mild cognitive and functional deficits.    Insomnia--Trial Rozerem 4mg    -- seizure ppx, --Keppra    HTN--amlodipine    BPH--finasteride, flomax    ETOH dependence--folate, thiamine        Precautions:                                                                                  Diet:     DVT Prophylaxis:                                                                          Medical Prognosis:     Prescreen Comparison: I have reviewed the prescreen information and I found no relevant changes between the preadmission  screening and my post admission evaluation.     Expected Therapy:   P.T.        hrs/day           O. T.      hrs/day           S.L.P.        hrs/day                    P&O                                                   Excpected Frequency: 5 days/7 day period    Rehab Potential:                                           Estimated Disposition:                          ELOS:              days      Rationale For Inpatient Rehab Admission- Patient demonstrates the following:     [X] Medically appropriate for rehabilitation admission   [X] Has attainable rehab goals with an approrpriate discharge plan  [X] Has rehabilitation potential (expected to make significant improvement within a reasonable period of time)  [X] Requires close medical management by a rehab physician, rehab nursing care and comprehensive interdisciplinary team (including         PT, OT, SLP and/or prosthetics and orthotics) 72 y/o male with an unwitnessed fall suffering bilateral SDH and SAH, managed non-operatively, with subsequent fall and hyponatremia likely secondary to TBI, with mild cognitive and functional deficits.    -Bilateral SDH/SAH  -start comprehensive PT/OT    -SIADH  -BMP Monday    Insomnia--Trial Rozerem 4mg    -seizure ppx, --Keppra    HTN--amlodipine    BPH--finasteride, flomax    ETOH dependence--folate, thiamine      DVT Prophylaxis: 72 y/o male with an unwitnessed fall suffering bilateral SDH and SAH, managed non-operatively, with subsequent fall and hyponatremia likely secondary to TBI, with mild cognitive and functional deficits.    -Bilateral SDH/SAH  -start comprehensive PT/OT    -SIADH  -Nephrology note appreciated, continue regular diet  -BMP Monday    Insomnia--Trial Rozerem 4mg    -seizure ppx, --Keppra    HTN--amlodipine    BPH--finasteride, flomax    ETOH dependence--folate, thiamine      DVT Prophylaxis: 72 y/o male with an unwitnessed fall suffering bilateral SDH and SAH, managed non-operatively, with subsequent fall and hyponatremia likely secondary to TBI, with mild cognitive and functional deficits.    -Bilateral SDH/SAH  -start comprehensive PT/OT    -SIADH  -Nephrology note appreciated, continue regular diet  -BMP Monday    Insomnia--Trial Rozerem 4mg    -seizure ppx, --Keppra    HTN--amlodipine    BPH--finasteride, flomax    ETOH dependence--folate, thiamine

## 2019-06-08 NOTE — CONSULT NOTE ADULT - SUBJECTIVE AND OBJECTIVE BOX
Patient is a 73y old  Male who presents with a chief complaint of SDH (08 Jun 2019 08:56)    73 year old male with PMHx of BPH who was transferred from [facility] to [facility] on 5/28/19 after suffering a fall 2 days prior to admission. Patient reports that he was home and drinking. States that he was home alone and that his wife was on vacation. He remembers seeing the evidence of multiple falls at home with blood on the floor in a couple of areas. Patient states he does not remember when or how he fell. Wife reports that he was to pick her up from the airport the day after the fall/falls occurred and a neighbor decided that he was unsafe to drive.   Once his wife returned home, she brought him to the ER at [facility]. CT of the head showed acute bilateral SDH and SAH (bilateral SDH, SAH, tentorial hemorrhage and left occipital hemorrhage/contusion).  At [facility], he was also noted to have a seizure and received Keppra and Ativan. Patient was then transferred to [facility] for further evaluation. Patient did not need surgical intervention and was discharged home on 5/30/19. Keppra was continued to prevent further seizures.    Patient was readmitted to [facility] on 6/1/19. Wife reports that patient was found to be lethargic at home and had difficulty ambulating. Daughter came and had to "drag" him out to the car b/c he was unable to walk. Repeat CT of the head showed stable left occipital hemorrhage with stable small hemorrhages.   Neurology was consulted. EEG done without signs of seizure activity.   Patient found to have hyponatremia likely due to SIADH    Patient seen and examined at bedside.  Denies any chest pain or shortness of breath.      ALLERGIES:  No Known Allergies    MEDICATIONS:  acetaminophen   Tablet .. 650 milliGRAM(s) Oral every 6 hours  amLODIPine   Tablet 5 milliGRAM(s) Oral daily  finasteride 5 milliGRAM(s) Oral daily  folic acid 1 milliGRAM(s) Oral daily  hydrocortisone 1% Ointment 1 Application(s) Topical two times a day  levETIRAcetam 500 milliGRAM(s) Oral two times a day  LORazepam     Tablet 0.5 milliGRAM(s) Oral at bedtime PRN  meclizine 25 milliGRAM(s) Oral three times a day PRN  ramelteon 4 milliGRAM(s) Oral at bedtime  tamsulosin 0.4 milliGRAM(s) Oral at bedtime  thiamine 100 milliGRAM(s) Oral daily    Vital Signs Last 24 Hrs  T(F): 97.4 (08 Jun 2019 08:09), Max: 98.6 (07 Jun 2019 20:31)  HR: 65 (08 Jun 2019 08:09) (65 - 67)  BP: 125/74 (08 Jun 2019 08:09) (114/64 - 127/74)  RR: 14 (08 Jun 2019 08:09) (12 - 14)  SpO2: 99% (08 Jun 2019 08:09) (96% - 99%)  I&O's Summary    07 Jun 2019 07:01  -  08 Jun 2019 07:00  --------------------------------------------------------  IN: 210 mL / OUT: 0 mL / NET: 210 mL        PHYSICAL EXAM:  General: NAD, A/O x 3  ENT: MMM  Neck: Supple, No JVD  Lungs: Clear to auscultation bilaterally  Cardio: RRR, S1/S2, No murmurs  Abdomen: Soft, Nontender, Nondistended; Bowel sounds present  Extremities: No cyanosis, No edema, ecchymosis on right arm resolving    LABS:                        12.2   8.34  )-----------( 218      ( 08 Jun 2019 06:03 )             36.4     06-08    134  |  99  |  24  ----------------------------<  111  4.4   |  28  |  1.02    Ca    9.0      08 Jun 2019 06:03    TPro  7.2  /  Alb  3.4  /  TBili  1.3  /  DBili  x   /  AST  25  /  ALT  73  /  AlkPhos  139  06-08    eGFR if Non African American: 73 mL/min/1.73M2 (06-08-19 @ 06:03)  eGFR if African American: 85 mL/min/1.73M2 (06-08-19 @ 06:03)                    CAPILLARY BLOOD GLUCOSE        06-04 [id number] 5.6          RADIOLOGY & ADDITIONAL TESTS:    Care Discussed with Consultants/Other Providers:

## 2019-06-08 NOTE — CONSULT NOTE ADULT - ASSESSMENT
72 y/o male with an unwitnessed fall suffering bilateral SDH and SAH, managed non-operatively, with subsequent fall and hyponatremia likely secondary to TBI, with mild cognitive and functional deficits.    -Bilateral SDH/SAH  -start comprehensive PT/OT    -SIADH  -Nephrology consulting  -Maintain fluid limits for now  -BMP Monday    Insomnia--Trial Rozerem 4mg    -seizure ppx, --Keppra    HTN--amlodipine    BPH--finasteride, flomax    ETOH dependence--folate, thiamine

## 2019-06-09 PROCEDURE — 99233 SBSQ HOSP IP/OBS HIGH 50: CPT

## 2019-06-09 RX ADMIN — Medication 1 APPLICATION(S): at 05:39

## 2019-06-09 RX ADMIN — Medication 0.5 MILLIGRAM(S): at 22:22

## 2019-06-09 RX ADMIN — TAMSULOSIN HYDROCHLORIDE 0.4 MILLIGRAM(S): 0.4 CAPSULE ORAL at 22:23

## 2019-06-09 RX ADMIN — LEVETIRACETAM 500 MILLIGRAM(S): 250 TABLET, FILM COATED ORAL at 16:54

## 2019-06-09 RX ADMIN — Medication 650 MILLIGRAM(S): at 05:35

## 2019-06-09 RX ADMIN — LEVETIRACETAM 500 MILLIGRAM(S): 250 TABLET, FILM COATED ORAL at 05:36

## 2019-06-09 RX ADMIN — Medication 1 MILLIGRAM(S): at 12:32

## 2019-06-09 RX ADMIN — Medication 650 MILLIGRAM(S): at 06:20

## 2019-06-09 RX ADMIN — Medication 4 MILLIGRAM(S): at 22:23

## 2019-06-09 RX ADMIN — FINASTERIDE 5 MILLIGRAM(S): 5 TABLET, FILM COATED ORAL at 12:32

## 2019-06-09 RX ADMIN — AMLODIPINE BESYLATE 5 MILLIGRAM(S): 2.5 TABLET ORAL at 05:36

## 2019-06-09 RX ADMIN — Medication 650 MILLIGRAM(S): at 23:05

## 2019-06-09 RX ADMIN — Medication 100 MILLIGRAM(S): at 12:32

## 2019-06-09 RX ADMIN — Medication 650 MILLIGRAM(S): at 22:22

## 2019-06-09 NOTE — PROGRESS NOTE ADULT - ASSESSMENT
72 y/o male with an unwitnessed fall suffering bilateral SDH and SAH, managed non-operatively, with subsequent fall and hyponatremia likely secondary to TBI, with mild cognitive and functional deficits.    -Bilateral SDH/SAH  -continue comprehensive PT/OT    -SIADH  -Nephrology note appreciated, continue regular diet  -BMP Monday    Insomnia--Trial Rozerem 4mg    -seizure ppx, --Keppra    HTN--amlodipine    BPH--finasteride, flomax    ETOH dependence--folate, thiamine

## 2019-06-09 NOTE — PROGRESS NOTE ADULT - SUBJECTIVE AND OBJECTIVE BOX
History of Present Illness:  Reason for Admission: SDH	  History of Present Illness: 	  73 year old male with PMHx of BPH who was transferred from [facility] to [facility] on 5/28/19 after suffering a fall 2 days prior to admission. Patient reports that he was home and drinking. States that he was home alone and that his wife was on vacation. He remembers seeing the evidence of multiple falls at home with blood on the floor in a couple of areas. Patient states he does not remember when or how he fell. Wife reports that he was to pick her up from the airport the day after the fall/falls occurred and a neighbor decided that he was unsafe to drive.   Once his wife returned home, she brought him to the ER at [facility]. CT of the head showed acute bilateral SDH and SAH (bilateral SDH, SAH, tentorial hemorrhage and left occipital hemorrhage/contusion).  At [facility], he was also noted to have a seizure and received Keppra and Ativan. Patient was then transferred to [facility] for further evaluation. Patient did not need surgical intervention and was discharged home on 5/30/19. Keppra was continued to prevent further seizures.    Patient was readmitted to [facility] on 6/1/19. Wife reports that patient was found to be lethargic at home and had difficulty ambulating. Daughter came and had to "drag" him out to the car b/c he was unable to walk. Repeat CT of the head showed stable left occipital hemorrhage with stable small hemorrhages.   Neurology was consulted. EEG done without signs of seizure activity.   Patient found to have hyponatremia. Nephrology consulted. Will likely order tolvaptan.   PT consulted and found patient to need min A for ambulation yesterday with rolling walker.   OT consulted and found patient ot be supervision to  for ADLs.    Admitted to rehab 6/7/19.        Review of Systems:  Review of Systems: REVIEW OF SYSTEMS/Subjective:  CONSTITUTIONAL: No fever or fatigue  NECK: No pain or stiffness  RESPIRATORY: No cough, chills; No shortness of breath   CARDIOVASCULAR: No chest pain or leg swelling  GASTROINTESTINAL: No abdominal or epigastric pain. No nausea, vomiting  GENITOURINARY: No dysuria  NEUROLOGICAL: no dizziness.   SKIN: No rashes, or lesions   MUSCULOSKELETAL: No joint pain or swelling  PSYCHIATRIC: No depression, anxiety 	      Allergies and Intolerances:        Allergies:  	No Known Allergies:         Patient History:    Past Medical, Past Surgical, and Family History:  PAST MEDICAL HISTORY:  BPH (benign prostatic hyperplasia)     ICH (intracerebral hemorrhage).     PAST SURGICAL HISTORY:  No significant past surgical history.           Physical Exam:  Physical Exam: Constitutional - NAD, Comfortable  HEENT - AT/NC, no nystagmus   Chest - CTA throughout   Cardiovascular - All extremities well perfused, S1S2, no murmurs   Abdomen - Soft, Non-distended, +BS   Extremities - no edema   Neurologic Exam -                    Cognitive - Awake, Alert, Oriented to self, place, date, year, situation      Communication - Fluent          Motor -                     LEFT    UE - ShAB 5/5, EF 5/5, EE 5/5, WE 5/5,  5/5                    RIGHT UE - ShAB 5/5, EF 5/5, EE 5/5, WE 5/5,  5/5                    LEFT    LE - HF 5/5, KE 5/5, DF 5/5, PF 5/5                    RIGHT LE - HF 5/5, KE 5/5, DF 5/5, PF 5/5        Sensory - Intact to light touch bilat upper and lower extremities      Psychiatric -  affect wnl

## 2019-06-10 DIAGNOSIS — S06.5X0A TRAUMATIC SUBDURAL HEMORRHAGE WITHOUT LOSS OF CONSCIOUSNESS, INITIAL ENCOUNTER: ICD-10-CM

## 2019-06-10 LAB
ANION GAP SERPL CALC-SCNC: 9 MMOL/L — SIGNIFICANT CHANGE UP (ref 5–17)
BUN SERPL-MCNC: 24 MG/DL — HIGH (ref 7–23)
CALCIUM SERPL-MCNC: 8.7 MG/DL — SIGNIFICANT CHANGE UP (ref 8.4–10.5)
CHLORIDE SERPL-SCNC: 99 MMOL/L — SIGNIFICANT CHANGE UP (ref 96–108)
CO2 SERPL-SCNC: 26 MMOL/L — SIGNIFICANT CHANGE UP (ref 22–31)
CREAT SERPL-MCNC: 1.14 MG/DL — SIGNIFICANT CHANGE UP (ref 0.5–1.3)
GLUCOSE SERPL-MCNC: 128 MG/DL — HIGH (ref 70–99)
POTASSIUM SERPL-MCNC: 4 MMOL/L — SIGNIFICANT CHANGE UP (ref 3.5–5.3)
POTASSIUM SERPL-SCNC: 4 MMOL/L — SIGNIFICANT CHANGE UP (ref 3.5–5.3)
SODIUM SERPL-SCNC: 134 MMOL/L — LOW (ref 135–145)

## 2019-06-10 PROCEDURE — 99232 SBSQ HOSP IP/OBS MODERATE 35: CPT

## 2019-06-10 RX ORDER — LACTULOSE 10 G/15ML
20 SOLUTION ORAL ONCE
Refills: 0 | Status: COMPLETED | OUTPATIENT
Start: 2019-06-10 | End: 2019-06-10

## 2019-06-10 RX ADMIN — Medication 1 APPLICATION(S): at 18:12

## 2019-06-10 RX ADMIN — Medication 650 MILLIGRAM(S): at 06:10

## 2019-06-10 RX ADMIN — TAMSULOSIN HYDROCHLORIDE 0.4 MILLIGRAM(S): 0.4 CAPSULE ORAL at 21:30

## 2019-06-10 RX ADMIN — Medication 4 MILLIGRAM(S): at 21:30

## 2019-06-10 RX ADMIN — AMLODIPINE BESYLATE 5 MILLIGRAM(S): 2.5 TABLET ORAL at 05:33

## 2019-06-10 RX ADMIN — FINASTERIDE 5 MILLIGRAM(S): 5 TABLET, FILM COATED ORAL at 12:34

## 2019-06-10 RX ADMIN — Medication 100 MILLIGRAM(S): at 12:34

## 2019-06-10 RX ADMIN — LEVETIRACETAM 500 MILLIGRAM(S): 250 TABLET, FILM COATED ORAL at 18:10

## 2019-06-10 RX ADMIN — Medication 650 MILLIGRAM(S): at 22:52

## 2019-06-10 RX ADMIN — Medication 1 MILLIGRAM(S): at 12:34

## 2019-06-10 RX ADMIN — LEVETIRACETAM 500 MILLIGRAM(S): 250 TABLET, FILM COATED ORAL at 05:33

## 2019-06-10 RX ADMIN — Medication 650 MILLIGRAM(S): at 21:31

## 2019-06-10 RX ADMIN — Medication 650 MILLIGRAM(S): at 05:33

## 2019-06-10 RX ADMIN — Medication 1 APPLICATION(S): at 05:41

## 2019-06-10 NOTE — PROGRESS NOTE ADULT - SUBJECTIVE AND OBJECTIVE BOX
HPI:  73 year old male with PMHx of BPH who was transferred from [facility] to [facility] on 5/28/19 after suffering a fall 2 days prior to admission. Patient reports that he was home and drinking. States that he was home alone and that his wife was on vacation. He remembers seeing the evidence of multiple falls at home with blood on the floor in a couple of areas. Patient states he does not remember when or how he fell. Wife reports that he was to pick her up from the airport the day after the fall/falls occurred and a neighbor decided that he was unsafe to drive.   Once his wife returned home, she brought him to the ER at [facility]. CT of the head showed acute bilateral SDH and SAH (bilateral SDH, SAH, tentorial hemorrhage and left occipital hemorrhage/contusion).  At [facility], he was also noted to have a seizure and received Keppra and Ativan. Patient was then transferred to [facility] for further evaluation. Patient did not need surgical intervention and was discharged home on 5/30/19. Keppra was continued to prevent further seizures.    Patient was readmitted to [facility] on 6/1/19. Wife reports that patient was found to be lethargic at home and had difficulty ambulating. Daughter came and had to "drag" him out to the car b/c he was unable to walk. Repeat CT of the head showed stable left occipital hemorrhage with stable small hemorrhages.   Neurology was consulted. EEG done without signs of seizure activity.   Patient found to have hyponatremia. Nephrology consulted. Will likely order tolvaptan.   PT consulted and found patient to need min A for ambulation yesterday with rolling walker.   OT consulted and found patient ot be supervision to  for ADLs.    Admitted to rehab 6/7/19. (07 Jun 2019 14:55)      PAST MEDICAL & SURGICAL HISTORY:  ICH (intracerebral hemorrhage)  BPH (benign prostatic hyperplasia)  No significant past surgical history      Subjective:  Sleeping better.  Wakes up though at night with back pain due to bed being uncomfortable.  Doing well in therapy    REVIEW OF SYMPTOMS  Neurological deficits,  insomnia. LBP    VITALS  Vital Signs Last 24 Hrs  T(C): 36.5 (10 Michael 2019 07:40), Max: 37 (09 Jun 2019 20:25)  T(F): 97.7 (10 Michael 2019 07:40), Max: 98.6 (09 Jun 2019 20:25)  HR: 63 (10 Michael 2019 07:40) (63 - 68)  BP: 129/73 (10 Michael 2019 07:40) (120/65 - 134/75)  BP(mean): --  RR: 14 (10 Michael 2019 07:40) (14 - 16)  SpO2: 98% (10 Michael 2019 07:40) (97% - 99%)      PHYSICAL EXAM  Constitutional - NAD, Comfortable  	HEENT - PERRLA, no nystagmus or  saccades  	Chest - CTA throughout   	Cardiovascular - All extremities well perfused, S1S2, no murmurs   	Abdomen - Soft, Non-distended, +BS   	Extremities - no edema   	Neurologic Exam -                 	   Cognitive - Awake, Alert, Oriented to self, place, date, year, situation	  	   Communication - Fluent, No dysarthria, Naming intact  	   Attention -  able to recite months of year backwards,  +difficulty with serial 7s  	   Memory -  able to recall 3/3 items after 3 minutes,   	   Cranial Nerves - CN 2-12 grossly intact  	   Motor -   	                  LEFT    UE - ShAB 5/5, EF 5/5, EE 5/5, WE 5/5,  5/5  	                  RIGHT UE - ShAB 5/5, EF 5/5, EE 5/5, WE 5/5,  5/5  	                  LEFT    LE - HF 5/5, KE 5/5, DF 5/5, PF 5/5  	                  RIGHT LE - HF 5/5, KE 5/5, DF 5/5, PF 5/5     	   Sensory - Intact to light touch bilat upper and lower extremities   	   Coordination - Finger-to-nose intact bilaterally, HTS intact bilaterally   Psychiatric -  affect wnl  RECENT LABS    06-10    134<L>  |  99  |  24<H>  ----------------------------<  128<H>  4.0   |  26  |  1.14    Ca    8.7      10 Michael 2019 05:40              RADIOLOGY/OTHER RESULTS      MEDICATIONS  (STANDING):  amLODIPine   Tablet 5 milliGRAM(s) Oral daily  finasteride 5 milliGRAM(s) Oral daily  folic acid 1 milliGRAM(s) Oral daily  hydrocortisone 1% Ointment 1 Application(s) Topical two times a day  levETIRAcetam 500 milliGRAM(s) Oral two times a day  ramelteon 4 milliGRAM(s) Oral at bedtime  tamsulosin 0.4 milliGRAM(s) Oral at bedtime  thiamine 100 milliGRAM(s) Oral daily    MEDICATIONS  (PRN):  acetaminophen   Tablet .. 650 milliGRAM(s) Oral every 6 hours PRN Temp greater or equal to 38C (100.4F), Mild Pain (1 - 3)  benzonatate 100 milliGRAM(s) Oral three times a day PRN Cough  LORazepam     Tablet 0.5 milliGRAM(s) Oral at bedtime PRN Agitation  meclizine 25 milliGRAM(s) Oral three times a day PRN Dizziness

## 2019-06-10 NOTE — PROGRESS NOTE ADULT - ASSESSMENT
72 y/o male with an unwitnessed fall suffering bilateral SDH and SAH, managed non-operatively, with subsequent fall and hyponatremia likely secondary to TBI, with mild cognitive and functional deficits.    Insomnia--cont. Rozerem 4mg    -- seizure d/o--, --Keppra    HTN--amlodipine    BPH--finasteride, flomax    ETOH dependence--folate, thiamine

## 2019-06-10 NOTE — PROGRESS NOTE ADULT - SUBJECTIVE AND OBJECTIVE BOX
No distress    Vital Signs Last 24 Hrs  T(C): 36.5 (06-10-19 @ 07:40), Max: 37 (06-09-19 @ 20:25)  T(F): 97.7 (06-10-19 @ 07:40), Max: 98.6 (06-09-19 @ 20:25)  HR: 63 (06-10-19 @ 07:40) (63 - 68)  BP: 129/73 (06-10-19 @ 07:40) (120/65 - 134/75)  RR: 14 (06-10-19 @ 07:40) (14 - 16)  SpO2: 98% (06-10-19 @ 07:40) (97% - 99%)    Lungs clear to auscultation  Card S12S2  Abdomen soft, NT, ND  Extr no edema                                     10 Michael 2019 05:40    134    |  99     |  24     ----------------------------<  128    4.0     |  26     |  1.14     Ca    8.7        10 Michael 2019 05:40    acetaminophen   Tablet .. 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

## 2019-06-11 PROCEDURE — 99232 SBSQ HOSP IP/OBS MODERATE 35: CPT

## 2019-06-11 RX ADMIN — Medication 100 MILLIGRAM(S): at 11:17

## 2019-06-11 RX ADMIN — LEVETIRACETAM 500 MILLIGRAM(S): 250 TABLET, FILM COATED ORAL at 17:31

## 2019-06-11 RX ADMIN — TAMSULOSIN HYDROCHLORIDE 0.4 MILLIGRAM(S): 0.4 CAPSULE ORAL at 21:21

## 2019-06-11 RX ADMIN — AMLODIPINE BESYLATE 5 MILLIGRAM(S): 2.5 TABLET ORAL at 05:22

## 2019-06-11 RX ADMIN — LEVETIRACETAM 500 MILLIGRAM(S): 250 TABLET, FILM COATED ORAL at 05:22

## 2019-06-11 RX ADMIN — FINASTERIDE 5 MILLIGRAM(S): 5 TABLET, FILM COATED ORAL at 11:17

## 2019-06-11 RX ADMIN — Medication 1 APPLICATION(S): at 17:34

## 2019-06-11 RX ADMIN — Medication 4 MILLIGRAM(S): at 21:21

## 2019-06-11 RX ADMIN — Medication 1 MILLIGRAM(S): at 11:18

## 2019-06-11 RX ADMIN — Medication 1 APPLICATION(S): at 05:22

## 2019-06-11 NOTE — DIETITIAN INITIAL EVALUATION ADULT. - SOURCE
patient/74 y/o male with an unwitnessed fall suffering bilateral SDH and SAH, managed non-operatively, with subsequent fall and hyponatremia likely secondary to TBI, with mild cognitive and functional deficits.

## 2019-06-11 NOTE — PROGRESS NOTE ADULT - ASSESSMENT
ASSESSMENT/PLAN  72 y/o male with an unwitnessed fall suffering bilateral SDH and SAH, managed non-operatively, with subsequent fall and hyponatremia likely secondary to TBI, with mild cognitive and functional deficits.     COMORBIDITIES / ACTIVE MEDICAL ISSUES     Gait Instability, ADL impairments and Functional impairments:   - start Comprehensive Rehab Program of PT/OT   - Pain Mgmt - Tylenol PRN, Oxycodone PRN  - GI/Bowel Mgmt -  Colace, Senna,  Dulcolax PRN, Miralax PRN,  - /Bladder Mgmt -  PVR  - Insomnia - cont. Rozerem 4mg  - seizure d/o--, --Keppra  - SLP - evaluation and treatment    HTN--amlodipine    BPH--finasteride, flomax    ETOH dependence--folate, thiamine    FEN   - Diet - Regular + Thins ASSESSMENT/PLAN  74 y/o male with an unwitnessed fall suffering bilateral SDH and SAH, managed non-operatively, with subsequent fall and hyponatremia likely secondary to TBI, with mild cognitive and functional deficits.     COMORBIDITIES / ACTIVE MEDICAL ISSUES     Gait Instability, ADL impairments and Functional impairments:   - contt Comprehensive Rehab Program of PT/OT   - Pain Mgmt - Tylenol PRN, Oxycodone PRN  - GI/Bowel Mgmt -  Colace, Senna,  Dulcolax PRN, Miralax PRN,  - /Bladder Mgmt -  PVR  - Insomnia - cont. Rozerem 4mg  - seizure d/o--, --Keppra  - SLP - evaluation and treatment    HTN--amlodipine    BPH--finasteride, flomax    ETOH dependence--folate, thiamine    FEN   - Diet - Regular + Thins

## 2019-06-11 NOTE — DIETITIAN INITIAL EVALUATION ADULT. - OTHER INFO
Appetite is noted good , enjoying the food . Patient consuming 100% of meals. Skin is intact (+) BM (6/10).

## 2019-06-11 NOTE — PROGRESS NOTE ADULT - SUBJECTIVE AND OBJECTIVE BOX
No distress    Vital Signs Last 24 Hrs  T(C): 36.8 (06-11-19 @ 07:43), Max: 36.9 (06-10-19 @ 19:54)  T(F): 98.3 (06-11-19 @ 07:43), Max: 98.5 (06-10-19 @ 19:54)  HR: 78 (06-11-19 @ 07:43) (68 - 78)  BP: 115/65 (06-11-19 @ 07:43) (115/65 - 131/76)  RR: 14 (06-11-19 @ 07:43) (14 - 14)  SpO2: 96% (06-11-19 @ 07:43) (96% - 98%)    Lungs clear to auscultation  Card S12S2  Abdomen soft, NT, ND  Extr no edema                                     10 Michael 2019 05:40    134    |  99     |  24     ----------------------------<  128    4.0     |  26     |  1.14     Ca    8.7        10 Michael 2019 05:40    acetaminophen   Tablet .. 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

## 2019-06-11 NOTE — PROGRESS NOTE ADULT - SUBJECTIVE AND OBJECTIVE BOX
HPI:  73 year old male with PMHx of BPH who was transferred from [facility] to [facility] on 5/28/19 after suffering a fall 2 days prior to admission. Patient reports that he was home and drinking. States that he was home alone and that his wife was on vacation. He remembers seeing the evidence of multiple falls at home with blood on the floor in a couple of areas. Patient states he does not remember when or how he fell. Wife reports that he was to pick her up from the airport the day after the fall/falls occurred and a neighbor decided that he was unsafe to drive.   Once his wife returned home, she brought him to the ER at [facility]. CT of the head showed acute bilateral SDH and SAH (bilateral SDH, SAH, tentorial hemorrhage and left occipital hemorrhage/contusion).  At [facility], he was also noted to have a seizure and received Keppra and Ativan. Patient was then transferred to [facility] for further evaluation. Patient did not need surgical intervention and was discharged home on 5/30/19. Keppra was continued to prevent further seizures.    Patient was readmitted to [facility] on 6/1/19. Wife reports that patient was found to be lethargic at home and had difficulty ambulating. Daughter came and had to "drag" him out to the car b/c he was unable to walk. Repeat CT of the head showed stable left occipital hemorrhage with stable small hemorrhages.   Neurology was consulted. EEG done without signs of seizure activity.   Patient found to have hyponatremia. Nephrology consulted. Will likely order tolvaptan.   PT consulted and found patient to need min A for ambulation yesterday with rolling walker.   OT consulted and found patient ot be supervision to  for ADLs.    Admitted to rehab 6/7/19. (07 Jun 2019 14:55)      PAST MEDICAL & SURGICAL HISTORY:  ICH (intracerebral hemorrhage)  BPH (benign prostatic hyperplasia)  No significant past surgical history      Subjective:  No acute events overnight reported by the night team resident or nursing. Patient seen and evaluated in SLP. Doing well.     REVIEW OF SYMPTOMS  Neurological deficits    VITALS  Vital Signs Last 24 Hrs  T(C): 36.8 (11 Jun 2019 07:43), Max: 36.9 (10 Michael 2019 19:54)  T(F): 98.3 (11 Jun 2019 07:43), Max: 98.5 (10 Michael 2019 19:54)  HR: 78 (11 Jun 2019 07:43) (68 - 78)  BP: 115/65 (11 Jun 2019 07:43) (115/65 - 131/76)  BP(mean): --  RR: 14 (11 Jun 2019 07:43) (14 - 14)  SpO2: 96% (11 Jun 2019 07:43) (96% - 98%)      PHYSICAL EXAM  Constitutional - NAD, Comfortable  	HEENT - PERRLA, no nystagmus or  saccades  	Chest - CTA throughout   	Cardiovascular - All extremities well perfused, S1S2, no murmurs   	Abdomen - Soft, Non-distended, +BS   	Extremities - no edema   	Neurologic Exam -                 	   Cognitive - Awake, Alert, Oriented to self, place, date, year, situation	  	   Communication - Fluent, No dysarthria, Naming intact  	   Attention -  able to recite months of year backwards,  +difficulty with serial 7s  	   Memory -  able to recall 3/3 items after 3 minutes,   	   Cranial Nerves - CN 2-12 grossly intact  	   Motor -   	                  LEFT    UE - ShAB 5/5, EF 5/5, EE 5/5, WE 5/5,  5/5  	                  RIGHT UE - ShAB 5/5, EF 5/5, EE 5/5, WE 5/5,  5/5  	                  LEFT    LE - HF 5/5, KE 5/5, DF 5/5, PF 5/5  	                  RIGHT LE - HF 5/5, KE 5/5, DF 5/5, PF 5/5     	   Sensory - Intact to light touch bilat upper and lower extremities   	   Coordination - Finger-to-nose intact bilaterally, HTS intact bilaterally   Psychiatric -  affect wnl  RECENT LABS    06-10    134<L>  |  99  |  24<H>  ----------------------------<  128<H>  4.0   |  26  |  1.14    Ca    8.7      10 Michael 2019 05:40              RADIOLOGY/OTHER RESULTS      MEDICATIONS  (STANDING):  amLODIPine   Tablet 5 milliGRAM(s) Oral daily  finasteride 5 milliGRAM(s) Oral daily  folic acid 1 milliGRAM(s) Oral daily  hydrocortisone 1% Ointment 1 Application(s) Topical two times a day  levETIRAcetam 500 milliGRAM(s) Oral two times a day  ramelteon 4 milliGRAM(s) Oral at bedtime  tamsulosin 0.4 milliGRAM(s) Oral at bedtime  thiamine 100 milliGRAM(s) Oral daily    MEDICATIONS  (PRN):  acetaminophen   Tablet .. 650 milliGRAM(s) Oral every 6 hours PRN Temp greater or equal to 38C (100.4F), Mild Pain (1 - 3)  benzonatate 100 milliGRAM(s) Oral three times a day PRN Cough  LORazepam     Tablet 0.5 milliGRAM(s) Oral at bedtime PRN Agitation  meclizine 25 milliGRAM(s) Oral three times a day PRN Dizziness

## 2019-06-12 PROCEDURE — 99232 SBSQ HOSP IP/OBS MODERATE 35: CPT

## 2019-06-12 PROCEDURE — 99233 SBSQ HOSP IP/OBS HIGH 50: CPT

## 2019-06-12 RX ORDER — RAMELTEON 8 MG
8 TABLET ORAL AT BEDTIME
Refills: 0 | Status: DISCONTINUED | OUTPATIENT
Start: 2019-06-12 | End: 2019-06-15

## 2019-06-12 RX ADMIN — Medication 8 MILLIGRAM(S): at 21:13

## 2019-06-12 RX ADMIN — LEVETIRACETAM 500 MILLIGRAM(S): 250 TABLET, FILM COATED ORAL at 17:34

## 2019-06-12 RX ADMIN — Medication 1 APPLICATION(S): at 17:35

## 2019-06-12 RX ADMIN — FINASTERIDE 5 MILLIGRAM(S): 5 TABLET, FILM COATED ORAL at 11:48

## 2019-06-12 RX ADMIN — TAMSULOSIN HYDROCHLORIDE 0.4 MILLIGRAM(S): 0.4 CAPSULE ORAL at 21:13

## 2019-06-12 RX ADMIN — Medication 650 MILLIGRAM(S): at 22:29

## 2019-06-12 RX ADMIN — Medication 1 APPLICATION(S): at 05:59

## 2019-06-12 RX ADMIN — Medication 650 MILLIGRAM(S): at 21:12

## 2019-06-12 RX ADMIN — Medication 100 MILLIGRAM(S): at 11:48

## 2019-06-12 RX ADMIN — LEVETIRACETAM 500 MILLIGRAM(S): 250 TABLET, FILM COATED ORAL at 05:42

## 2019-06-12 RX ADMIN — AMLODIPINE BESYLATE 5 MILLIGRAM(S): 2.5 TABLET ORAL at 05:42

## 2019-06-12 RX ADMIN — Medication 1 MILLIGRAM(S): at 11:48

## 2019-06-12 NOTE — PROGRESS NOTE ADULT - SUBJECTIVE AND OBJECTIVE BOX
HPI:  73 year old male with PMHx of BPH who was transferred from [facility] to [facility] on 5/28/19 after suffering a fall 2 days prior to admission. Patient reports that he was home and drinking. States that he was home alone and that his wife was on vacation. He remembers seeing the evidence of multiple falls at home with blood on the floor in a couple of areas. Patient states he does not remember when or how he fell. Wife reports that he was to pick her up from the airport the day after the fall/falls occurred and a neighbor decided that he was unsafe to drive.   Once his wife returned home, she brought him to the ER at [facility]. CT of the head showed acute bilateral SDH and SAH (bilateral SDH, SAH, tentorial hemorrhage and left occipital hemorrhage/contusion).  At [facility], he was also noted to have a seizure and received Keppra and Ativan. Patient was then transferred to [facility] for further evaluation. Patient did not need surgical intervention and was discharged home on 5/30/19. Keppra was continued to prevent further seizures.    Patient was readmitted to [facility] on 6/1/19. Wife reports that patient was found to be lethargic at home and had difficulty ambulating. Daughter came and had to "drag" him out to the car b/c he was unable to walk. Repeat CT of the head showed stable left occipital hemorrhage with stable small hemorrhages.   Neurology was consulted. EEG done without signs of seizure activity.   Patient found to have hyponatremia. Nephrology consulted. Will likely order tolvaptan.   PT consulted and found patient to need min A for ambulation yesterday with rolling walker.   OT consulted and found patient ot be supervision to  for ADLs.    Admitted to rehab 6/7/19. (07 Jun 2019 14:55)      PAST MEDICAL & SURGICAL HISTORY:  ICH (intracerebral hemorrhage)  BPH (benign prostatic hyperplasia)  No significant past surgical history      Subjective:  No acute events overnight reported by the night team resident or nursing. Patient seen and evaluated at bedside, family is present. Patient was playing puzzles on ipad. He is without fever. No pain. Doing well in therapy.     REVIEW OF SYMPTOMS  Neurological deficits    VITALS  Vital Signs Last 24 Hrs  T(C): 36.8 (11 Jun 2019 07:43), Max: 36.9 (10 Michael 2019 19:54)  T(F): 98.3 (11 Jun 2019 07:43), Max: 98.5 (10 Michael 2019 19:54)  HR: 78 (11 Jun 2019 07:43) (68 - 78)  BP: 115/65 (11 Jun 2019 07:43) (115/65 - 131/76)  BP(mean): --  RR: 14 (11 Jun 2019 07:43) (14 - 14)  SpO2: 96% (11 Jun 2019 07:43) (96% - 98%)      PHYSICAL EXAM  Constitutional - NAD, Comfortable  	HEENT - PERRLA, no nystagmus or  saccades  	Chest - CTA throughout   	Cardiovascular - All extremities well perfused, S1S2, no murmurs   	Abdomen - Soft, Non-distended, +BS   	Extremities - no edema   	Neurologic Exam -                 	   Cognitive - Awake, Alert, Oriented to self, place, date, year, situation	  	   Communication - Fluent, No dysarthria, Naming intact  	   Attention -  able to recite months of year backwards,  +difficulty with serial 7s  	   Memory -  able to recall 3/3 items after 3 minutes,   	   Cranial Nerves - CN 2-12 grossly intact  	   Motor -   	                  LEFT    UE - ShAB 5/5, EF 5/5, EE 5/5, WE 5/5,  5/5  	                  RIGHT UE - ShAB 5/5, EF 5/5, EE 5/5, WE 5/5,  5/5  	                  LEFT    LE - HF 5/5, KE 5/5, DF 5/5, PF 5/5  	                  RIGHT LE - HF 5/5, KE 5/5, DF 5/5, PF 5/5     	   Sensory - Intact to light touch bilat upper and lower extremities   	   Coordination - Finger-to-nose intact bilaterally, HTS intact bilaterally   Psychiatric -  affect wnl  RECENT LABS    06-10    134<L>  |  99  |  24<H>  ----------------------------<  128<H>  4.0   |  26  |  1.14    Ca    8.7      10 Michael 2019 05:40              RADIOLOGY/OTHER RESULTS      MEDICATIONS  (STANDING):  amLODIPine   Tablet 5 milliGRAM(s) Oral daily  finasteride 5 milliGRAM(s) Oral daily  folic acid 1 milliGRAM(s) Oral daily  hydrocortisone 1% Ointment 1 Application(s) Topical two times a day  levETIRAcetam 500 milliGRAM(s) Oral two times a day  ramelteon 4 milliGRAM(s) Oral at bedtime  tamsulosin 0.4 milliGRAM(s) Oral at bedtime  thiamine 100 milliGRAM(s) Oral daily    MEDICATIONS  (PRN):  acetaminophen   Tablet .. 650 milliGRAM(s) Oral every 6 hours PRN Temp greater or equal to 38C (100.4F), Mild Pain (1 - 3)  benzonatate 100 milliGRAM(s) Oral three times a day PRN Cough  LORazepam     Tablet 0.5 milliGRAM(s) Oral at bedtime PRN Agitation  meclizine 25 milliGRAM(s) Oral three times a day PRN Dizziness HPI:  73 year old male with PMHx of BPH who was transferred from [facility] to [facility] on 5/28/19 after suffering a fall 2 days prior to admission. Patient reports that he was home and drinking. States that he was home alone and that his wife was on vacation. He remembers seeing the evidence of multiple falls at home with blood on the floor in a couple of areas. Patient states he does not remember when or how he fell. Wife reports that he was to pick her up from the airport the day after the fall/falls occurred and a neighbor decided that he was unsafe to drive.   Once his wife returned home, she brought him to the ER at [facility]. CT of the head showed acute bilateral SDH and SAH (bilateral SDH, SAH, tentorial hemorrhage and left occipital hemorrhage/contusion).  At [facility], he was also noted to have a seizure and received Keppra and Ativan. Patient was then transferred to [facility] for further evaluation. Patient did not need surgical intervention and was discharged home on 5/30/19. Keppra was continued to prevent further seizures.    Patient was readmitted to [facility] on 6/1/19. Wife reports that patient was found to be lethargic at home and had difficulty ambulating. Daughter came and had to "drag" him out to the car b/c he was unable to walk. Repeat CT of the head showed stable left occipital hemorrhage with stable small hemorrhages.   Neurology was consulted. EEG done without signs of seizure activity.   Patient found to have hyponatremia. Nephrology consulted. Will likely order tolvaptan.   PT consulted and found patient to need min A for ambulation yesterday with rolling walker.   OT consulted and found patient ot be supervision to  for ADLs.    Admitted to rehab 6/7/19. (07 Jun 2019 14:55)      PAST MEDICAL & SURGICAL HISTORY:  ICH (intracerebral hemorrhage)  BPH (benign prostatic hyperplasia)  No significant past surgical history      Subjective:  No acute events overnight reported by the night team resident or nursing. Patient seen and evaluated at bedside, family is present. Patient was playing puzzles on ipad. He is without fever. No pain. Doing well in therapy. c/o poor sleep    REVIEW OF SYMPTOMS  Neurological deficits    VITALS  Vital Signs Last 24 Hrs  T(C): 36.8 (11 Jun 2019 07:43), Max: 36.9 (10 Michael 2019 19:54)  T(F): 98.3 (11 Jun 2019 07:43), Max: 98.5 (10 Michael 2019 19:54)  HR: 78 (11 Jun 2019 07:43) (68 - 78)  BP: 115/65 (11 Jun 2019 07:43) (115/65 - 131/76)  BP(mean): --  RR: 14 (11 Jun 2019 07:43) (14 - 14)  SpO2: 96% (11 Jun 2019 07:43) (96% - 98%)      PHYSICAL EXAM  Constitutional - NAD, Comfortable  	HEENT - PERRLA, no nystagmus or  saccades  	Chest - CTA throughout   	Cardiovascular - All extremities well perfused, S1S2, no murmurs   	Abdomen - Soft, Non-distended, +BS   	Extremities - no edema   	Neurologic Exam -                 	   Cognitive - Awake, Alert, Oriented to self, place, date, year, situation	  	   Communication - Fluent, No dysarthria, Naming intact  	   Attention -  able to recite months of year backwards,  +difficulty with serial 7s  	   Memory -  able to recall 3/3 items after 3 minutes,   	   Cranial Nerves - CN 2-12 grossly intact  	   Motor -   	                  LEFT    UE - ShAB 5/5, EF 5/5, EE 5/5, WE 5/5,  5/5  	                  RIGHT UE - ShAB 5/5, EF 5/5, EE 5/5, WE 5/5,  5/5  	                  LEFT    LE - HF 5/5, KE 5/5, DF 5/5, PF 5/5  	                  RIGHT LE - HF 5/5, KE 5/5, DF 5/5, PF 5/5     	   Sensory - Intact to light touch bilat upper and lower extremities   	   Coordination - Finger-to-nose intact bilaterally, HTS intact bilaterally   Psychiatric -  affect wnl  RECENT LABS    06-10    134<L>  |  99  |  24<H>  ----------------------------<  128<H>  4.0   |  26  |  1.14    Ca    8.7      10 Michael 2019 05:40              RADIOLOGY/OTHER RESULTS      MEDICATIONS  (STANDING):  amLODIPine   Tablet 5 milliGRAM(s) Oral daily  finasteride 5 milliGRAM(s) Oral daily  folic acid 1 milliGRAM(s) Oral daily  hydrocortisone 1% Ointment 1 Application(s) Topical two times a day  levETIRAcetam 500 milliGRAM(s) Oral two times a day  ramelteon 4 milliGRAM(s) Oral at bedtime  tamsulosin 0.4 milliGRAM(s) Oral at bedtime  thiamine 100 milliGRAM(s) Oral daily    MEDICATIONS  (PRN):  acetaminophen   Tablet .. 650 milliGRAM(s) Oral every 6 hours PRN Temp greater or equal to 38C (100.4F), Mild Pain (1 - 3)  benzonatate 100 milliGRAM(s) Oral three times a day PRN Cough  LORazepam     Tablet 0.5 milliGRAM(s) Oral at bedtime PRN Agitation  meclizine 25 milliGRAM(s) Oral three times a day PRN Dizziness

## 2019-06-12 NOTE — PROGRESS NOTE ADULT - ASSESSMENT
ASSESSMENT/PLAN  72 y/o male with an unwitnessed fall suffering bilateral SDH and SAH, managed non-operatively, with subsequent fall and hyponatremia likely secondary to TBI, with mild cognitive and functional deficits.     COMORBIDITIES / ACTIVE MEDICAL ISSUES     Gait Instability, ADL impairments and Functional impairments:   - contt Comprehensive Rehab Program of PT/OT   - Pain Mgmt - Tylenol PRN, Oxycodone PRN  - GI/Bowel Mgmt -  Colace, Senna,  Dulcolax PRN, Miralax PRN,  - /Bladder Mgmt -  monitor   - Insomnia - cont. Rozerem 4mg  - seizure d/o--, --Keppra  - SLP - treatment    HTN--amlodipine    BPH--finasteride, flomax    ETOH dependence--folate, thiamine    FEN   - Diet - Regular + Thins    - hyponatremia 134 - mild - liberalize water restriction ASSESSMENT/PLAN  72 y/o male with an unwitnessed fall suffering bilateral SDH and SAH, managed non-operatively, with subsequent fall and hyponatremia likely secondary to TBI, with mild cognitive and functional deficits.     COMORBIDITIES / ACTIVE MEDICAL ISSUES     Gait Instability, ADL impairments and Functional impairments:   - cont Comprehensive Rehab Program of PT/OT   - Pain Mgmt - Tylenol PRN, Oxycodone PRN  - GI/Bowel Mgmt -  Colace, Senna,  Dulcolax PRN, Miralax PRN,  - /Bladder Mgmt -  monitor     - Insomnia - increase to Rozerem 8mg  - seizure d/o--, --Keppra  - SLP - treatment    HTN--amlodipine    BPH--finasteride, flomax    ETOH dependence--folate, thiamine    FEN   - Diet - Regular + Thins    - hyponatremia 134 - mild - liberalize water restriction

## 2019-06-12 NOTE — PROGRESS NOTE ADULT - ASSESSMENT
74 y/o male with an unwitnessed fall suffering bilateral SDH and SAH, managed non-operatively, with subsequent fall and hyponatremia likely secondary to TBI, with mild cognitive and functional deficits.     # B/L SDH and JONN (no intervention, medical management)  - cont. Comprehensive Rehab Program of PT/OT/SLP  - seizure prophylaxis--Keppra    # HTN--amlodipine    # BPH--finasteride, flomax    # ETOH dependence--folate, thiamine

## 2019-06-12 NOTE — PROGRESS NOTE ADULT - SUBJECTIVE AND OBJECTIVE BOX
Patient is a 73y old  Male who presents with a chief complaint of SDH (11 Jun 2019 18:31)      Patient seen and examined at bedside, no events overnight, in no acute distress.     ALLERGIES:  No Known Allergies    MEDICATIONS:  acetaminophen   Tablet .. 650 milliGRAM(s) Oral every 6 hours PRN  amLODIPine   Tablet 5 milliGRAM(s) Oral daily  benzonatate 100 milliGRAM(s) Oral three times a day PRN  finasteride 5 milliGRAM(s) Oral daily  folic acid 1 milliGRAM(s) Oral daily  hydrocortisone 1% Ointment 1 Application(s) Topical two times a day  levETIRAcetam 500 milliGRAM(s) Oral two times a day  LORazepam     Tablet 0.5 milliGRAM(s) Oral at bedtime PRN  meclizine 25 milliGRAM(s) Oral three times a day PRN  ramelteon 4 milliGRAM(s) Oral at bedtime  tamsulosin 0.4 milliGRAM(s) Oral at bedtime  thiamine 100 milliGRAM(s) Oral daily    Vital Signs Last 24 Hrs  T(C): 36.8 (12 Jun 2019 08:25), Max: 37 (11 Jun 2019 20:06)  T(F): 98.2 (12 Jun 2019 08:25), Max: 98.6 (11 Jun 2019 20:06)  HR: 73 (12 Jun 2019 08:25) (67 - 79)  BP: 137/73 (12 Jun 2019 08:25) (115/74 - 143/75)  BP(mean): --  RR: 14 (12 Jun 2019 08:25) (14 - 16)  SpO2: 97% (12 Jun 2019 08:25) (97% - 98%)  I&O's Summary    11 Jun 2019 07:01  -  12 Jun 2019 07:00  --------------------------------------------------------  IN: 350 mL / OUT: 600 mL / NET: -250 mL    12 Jun 2019 07:01  -  12 Jun 2019 15:29  --------------------------------------------------------  IN: 400 mL / OUT: 400 mL / NET: 0 mL          PAST MEDICAL & SURGICAL HISTORY:  ICH (intracerebral hemorrhage)  BPH (benign prostatic hyperplasia)  No significant past surgical history      Home Medications:  acetaminophen 325 mg oral tablet: 2 tab(s) orally every 6 hours, As needed, Temp greater or equal to 38C (100.4F), Mild Pain (1 - 3) (07 Jun 2019 11:08)  amLODIPine 5 mg oral tablet: 1 tab(s) orally once a day (07 Jun 2019 11:08)  finasteride 5 mg oral tablet: 1 tab(s) orally once a day (07 Jun 2019 11:08)  Flomax 0.4 mg oral capsule: 1 cap(s) orally once a day (28 May 2019 00:52)  hydrocortisone 1% topical ointment: 1 application topically 2 times a day (07 Jun 2019 11:08)  LORazepam 0.5 mg oral tablet: 1 tab(s) orally once a day (at bedtime), As needed, Agitation (07 Jun 2019 11:08)  meclizine 25 mg oral tablet: 1 tab(s) orally 3 times a day, As Needed (07 Jun 2019 11:08)  thiamine 100 mg oral tablet: 1 tab(s) orally once a day (07 Jun 2019 11:08)      PHYSICAL EXAM:  General: NAD  ENT: MMM  Neck: Supple, No JVD  Lungs: Clear to percussion bilaterally  Cardio: RRR, S1/S2, No murmurs  Abdomen: Soft, Nontender, Nondistended; Bowel sounds present  Neuro: no new tests   Extremities: No clubbing, cyanosis, or edema    LABS:    06-10    134  |  99  |  24  ----------------------------<  128  4.0   |  26  |  1.14    Ca    8.7      10 Michael 2019 05:40    06-04 [id number] 5.6    RADIOLOGY & ADDITIONAL TESTS: no new tests     Care Discussed with Consultants/Other Providers: PM&R team

## 2019-06-13 ENCOUNTER — APPOINTMENT (OUTPATIENT)
Dept: NEUROSURGERY | Facility: CLINIC | Age: 74
End: 2019-06-13

## 2019-06-13 LAB
ANION GAP SERPL CALC-SCNC: 5 MMOL/L — SIGNIFICANT CHANGE UP (ref 5–17)
BUN SERPL-MCNC: 23 MG/DL — SIGNIFICANT CHANGE UP (ref 7–23)
CALCIUM SERPL-MCNC: 9 MG/DL — SIGNIFICANT CHANGE UP (ref 8.4–10.5)
CHLORIDE SERPL-SCNC: 102 MMOL/L — SIGNIFICANT CHANGE UP (ref 96–108)
CO2 SERPL-SCNC: 30 MMOL/L — SIGNIFICANT CHANGE UP (ref 22–31)
CREAT SERPL-MCNC: 1.06 MG/DL — SIGNIFICANT CHANGE UP (ref 0.5–1.3)
GLUCOSE SERPL-MCNC: 98 MG/DL — SIGNIFICANT CHANGE UP (ref 70–99)
POTASSIUM SERPL-MCNC: 4.2 MMOL/L — SIGNIFICANT CHANGE UP (ref 3.5–5.3)
POTASSIUM SERPL-SCNC: 4.2 MMOL/L — SIGNIFICANT CHANGE UP (ref 3.5–5.3)
SODIUM SERPL-SCNC: 137 MMOL/L — SIGNIFICANT CHANGE UP (ref 135–145)

## 2019-06-13 PROCEDURE — 99232 SBSQ HOSP IP/OBS MODERATE 35: CPT

## 2019-06-13 PROCEDURE — 99233 SBSQ HOSP IP/OBS HIGH 50: CPT

## 2019-06-13 RX ADMIN — Medication 1 MILLIGRAM(S): at 12:27

## 2019-06-13 RX ADMIN — Medication 1 APPLICATION(S): at 05:31

## 2019-06-13 RX ADMIN — Medication 100 MILLIGRAM(S): at 12:27

## 2019-06-13 RX ADMIN — Medication 1 APPLICATION(S): at 17:53

## 2019-06-13 RX ADMIN — Medication 8 MILLIGRAM(S): at 21:20

## 2019-06-13 RX ADMIN — FINASTERIDE 5 MILLIGRAM(S): 5 TABLET, FILM COATED ORAL at 12:27

## 2019-06-13 RX ADMIN — AMLODIPINE BESYLATE 5 MILLIGRAM(S): 2.5 TABLET ORAL at 05:31

## 2019-06-13 RX ADMIN — LEVETIRACETAM 500 MILLIGRAM(S): 250 TABLET, FILM COATED ORAL at 05:31

## 2019-06-13 RX ADMIN — TAMSULOSIN HYDROCHLORIDE 0.4 MILLIGRAM(S): 0.4 CAPSULE ORAL at 21:19

## 2019-06-13 RX ADMIN — Medication 650 MILLIGRAM(S): at 08:17

## 2019-06-13 RX ADMIN — Medication 650 MILLIGRAM(S): at 08:38

## 2019-06-13 RX ADMIN — LEVETIRACETAM 500 MILLIGRAM(S): 250 TABLET, FILM COATED ORAL at 17:44

## 2019-06-13 NOTE — PROGRESS NOTE ADULT - ASSESSMENT
74 y/o male with an unwitnessed fall suffering bilateral SDH and SAH, managed non-operatively, with subsequent fall and hyponatremia likely secondary to TBI, with mild cognitive and functional deficits.     # B/L SDH and JONN (no intervention, medical management)  - cont. Comprehensive Rehab Program of PT/OT/SLP  - seizure prophylaxis--Keppra    # HTN--amlodipine    # BPH--finasteride, flomax    # ETOH dependence--folate, thiamine 74 y/o male with an unwitnessed fall suffering bilateral SDH and SAH, managed non-operatively, with subsequent fall and hyponatremia likely secondary to TBI, with mild cognitive and functional deficits.     # B/L SDH and JONN (no intervention, medical management)  - Comprehensive Rehab Program of PT/OT/rehab/SLP  - seizure prophylaxis c/w Keppra    # HTN  - BP controlled on amlodipine    # BPH  - c/w finasteride, flomax    # ETOH dependence  - c/w folic acid, thiamine    # DVT ppx  - venodynes

## 2019-06-13 NOTE — PROGRESS NOTE ADULT - ASSESSMENT
ASSESSMENT/PLAN  74 y/o male with an unwitnessed fall suffering bilateral SDH and SAH, managed non-operatively, with subsequent fall and hyponatremia likely secondary to TBI, with mild cognitive and functional deficits.     COMORBIDITIES / ACTIVE MEDICAL ISSUES     Gait Instability, ADL impairments and Functional impairments:   - cont Comprehensive Rehab Program of PT/OT   - Pain Mgmt - Tylenol PRN, Oxycodone PRN  - GI/Bowel Mgmt -  Colace, Senna,  Dulcolax PRN, Miralax PRN,  - /Bladder Mgmt -  monitor     - Insomnia -  Rozerem 8mg  - seizure d/o--, --Keppra  - SLP - treatment    HTN--amlodipine    BPH--finasteride, flomax    ETOH dependence--folate, thiamine    FEN   - Diet - Regular + Thins    - hyponatremia ASSESSMENT/PLAN  74 y/o male with an unwitnessed fall suffering bilateral SDH and SAH, managed non-operatively, with subsequent fall and hyponatremia likely secondary to TBI, with mild cognitive and functional deficits.     COMORBIDITIES / ACTIVE MEDICAL ISSUES     Gait Instability, ADL impairments and Functional impairments:   - cont Comprehensive Rehab Program of PT/OT   - Pain Mgmt - Tylenol PRN, Oxycodone PRN  - GI/Bowel Mgmt -  Colace, Senna,  Dulcolax PRN, Miralax PRN,  - /Bladder Mgmt -  monitor     - Insomnia -  Rozerem 8mg  - seizure d/o--, --Keppra  - SLP - treatment    HTN--amlodipine    BPH--finasteride, flomax    ETOH dependence--folate, thiamine    FEN   - Diet - Regular + Thins    - hyponatremia       Team meeting--Supervision with mobility and ADLs--will be mod I by weekend.  d/c home 6/15

## 2019-06-13 NOTE — PROGRESS NOTE ADULT - SUBJECTIVE AND OBJECTIVE BOX
No distress    Vital Signs Last 24 Hrs  T(C): 36.9 (06-13-19 @ 07:33), Max: 37 (06-12-19 @ 20:38)  T(F): 98.5 (06-13-19 @ 07:33), Max: 98.6 (06-12-19 @ 20:38)  HR: 77 (06-13-19 @ 07:33) (70 - 77)  BP: 142/82 (06-13-19 @ 07:33) (122/73 - 142/82)  RR: 14 (06-13-19 @ 07:33) (14 - 15)  SpO2: 95% (06-13-19 @ 07:33) (95% - 97%)    Lungs clear to auscultation  Card S12S2  Abdomen soft, NT, ND  Extr no edema                                     13 Jun 2019 05:40    137    |  102    |  23     ----------------------------<  98     4.2     |  30     |  1.06     Ca    9.0        13 Jun 2019 05:40    acetaminophen   Tablet .. 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  Rehab

## 2019-06-13 NOTE — PROGRESS NOTE ADULT - SUBJECTIVE AND OBJECTIVE BOX
Patient is a 73 year old male who presents with a chief complaint of SDH (12 Jun 2019 15:37)        MEDICATIONS  (STANDING):  amLODIPine   Tablet 5 milliGRAM(s) Oral daily  finasteride 5 milliGRAM(s) Oral daily  folic acid 1 milliGRAM(s) Oral daily  hydrocortisone 1% Ointment 1 Application(s) Topical two times a day  levETIRAcetam 500 milliGRAM(s) Oral two times a day  ramelteon 8 milliGRAM(s) Oral at bedtime  tamsulosin 0.4 milliGRAM(s) Oral at bedtime  thiamine 100 milliGRAM(s) Oral daily    MEDICATIONS  (PRN):  acetaminophen   Tablet .. 650 milliGRAM(s) Oral every 6 hours PRN Temp greater or equal to 38C (100.4F), Mild Pain (1 - 3)  benzonatate 100 milliGRAM(s) Oral three times a day PRN Cough  LORazepam     Tablet 0.5 milliGRAM(s) Oral at bedtime PRN Agitation  meclizine 25 milliGRAM(s) Oral three times a day PRN Dizziness        PHYSICAL EXAM:    T(C): 37 (06-12-19 @ 20:38), Max: 37 (06-12-19 @ 20:38)  HR: 70 (06-13-19 @ 05:31) (70 - 73)  BP: 127/76 (06-13-19 @ 05:31) (122/73 - 137/73)  RR: 15 (06-12-19 @ 20:38) (14 - 15)  SpO2: 97% (06-12-19 @ 20:38) (97% - 97%)    I&O's Summary    12 Jun 2019 07:01  -  13 Jun 2019 07:00  --------------------------------------------------------  IN: 400 mL / OUT: 400 mL / NET: 0 mL          LABS:    06-13    137  |  102  |  23  ----------------------------<  98  4.2   |  30  |  1.06    Ca    9.0      13 Jun 2019 05:40          RADIOLOGY & ADDITIONAL TESTS:        Consultant(s) Notes Reviewed:  [x] YES  [ ] NO    Care Discussed with Consultants/Other Providers [x] YES  [ ] NO Patient is a 73 year old male who presents with a chief complaint of SDH (12 Jun 2019 15:37)    Patient seen and examined at bedside    MEDICATIONS  (STANDING):  amLODIPine   Tablet 5 milliGRAM(s) Oral daily  finasteride 5 milliGRAM(s) Oral daily  folic acid 1 milliGRAM(s) Oral daily  hydrocortisone 1% Ointment 1 Application(s) Topical two times a day  levETIRAcetam 500 milliGRAM(s) Oral two times a day  ramelteon 8 milliGRAM(s) Oral at bedtime  tamsulosin 0.4 milliGRAM(s) Oral at bedtime  thiamine 100 milliGRAM(s) Oral daily    MEDICATIONS  (PRN):  acetaminophen   Tablet .. 650 milliGRAM(s) Oral every 6 hours PRN Temp greater or equal to 38C (100.4F), Mild Pain (1 - 3)  benzonatate 100 milliGRAM(s) Oral three times a day PRN Cough  LORazepam     Tablet 0.5 milliGRAM(s) Oral at bedtime PRN Agitation  meclizine 25 milliGRAM(s) Oral three times a day PRN Dizziness        PHYSICAL EXAM:    T(C): 37 (06-12-19 @ 20:38), Max: 37 (06-12-19 @ 20:38)  HR: 70 (06-13-19 @ 05:31) (70 - 73)  BP: 127/76 (06-13-19 @ 05:31) (122/73 - 137/73)  RR: 15 (06-12-19 @ 20:38) (14 - 15)  SpO2: 97% (06-12-19 @ 20:38) (97% - 97%)    I&O's Summary    12 Jun 2019 07:01  -  13 Jun 2019 07:00  --------------------------------------------------------  IN: 400 mL / OUT: 400 mL / NET: 0 mL      PHYSICAL EXAM:  General: NAD  Head: NC/AT  ENT: MMM  Neck: Supple, No JVD  Lungs: Clear to percussion bilaterally  Cardio: RRR, S1/S2, No murmurs  Abdomen: Soft, Nontender, Nondistended; Bowel sounds present  Neuro: no new deficits  Extremities: No clubbing, cyanosis, or edema  Psych: calm    LABS:    06-13    137  |  102  |  23  ----------------------------<  98  4.2   |  30  |  1.06    Ca    9.0      13 Jun 2019 05:40          RADIOLOGY & ADDITIONAL TESTS:    Consultant(s) Notes Reviewed:  [x] YES  [ ] NO    Care Discussed with Consultants/Other Providers [x] YES  [ ] NO Patient is a 73 year old male who presents with a chief complaint of SDH (12 Jun 2019 15:37)    Patient seen and examined at bedside    MEDICATIONS  (STANDING):  amLODIPine   Tablet 5 milliGRAM(s) Oral daily  finasteride 5 milliGRAM(s) Oral daily  folic acid 1 milliGRAM(s) Oral daily  hydrocortisone 1% Ointment 1 Application(s) Topical two times a day  levETIRAcetam 500 milliGRAM(s) Oral two times a day  ramelteon 8 milliGRAM(s) Oral at bedtime  tamsulosin 0.4 milliGRAM(s) Oral at bedtime  thiamine 100 milliGRAM(s) Oral daily    MEDICATIONS  (PRN):  acetaminophen   Tablet .. 650 milliGRAM(s) Oral every 6 hours PRN Temp greater or equal to 38C (100.4F), Mild Pain (1 - 3)  benzonatate 100 milliGRAM(s) Oral three times a day PRN Cough  LORazepam     Tablet 0.5 milliGRAM(s) Oral at bedtime PRN Agitation  meclizine 25 milliGRAM(s) Oral three times a day PRN Dizziness    REVIEW OF SYSTEMS:  CONSTITUTIONAL: No fever, weight loss, has fatigue  EYES: No eye pain, visual disturbances, or discharge  ENMT:  No difficulty hearing, tinnitus, vertigo; No sinus or throat pain  NECK: No pain or stiffness  RESPIRATORY: No cough, wheezing, chills or hemoptysis; No shortness of breath  CARDIOVASCULAR: No chest pain, palpitations, dizziness, or leg swelling  GASTROINTESTINAL: No abdominal or epigastric pain. No nausea, vomiting, or hematemesis; No diarrhea or constipation. No melena or hematochezia.  GENITOURINARY: No dysuria, frequency, hematuria, or incontinence  NEUROLOGICAL: No headaches, memory loss, loss of strength, numbness, or tremors  SKIN: No itching, burning, rashes, or lesions   ENDOCRINE: No heat or cold intolerance; No hair loss  MUSCULOSKELETAL: No joint pain or swelling; No muscle, back, or extremity pain  PSYCHIATRIC: No depression, anxiety, mood swings, or difficulty sleeping  HEME/LYMPH: No easy bruising, or bleeding gums  ALLERGY AND IMMUNOLOGIC: No hives or eczema      PHYSICAL EXAM:    T(C): 37 (06-12-19 @ 20:38), Max: 37 (06-12-19 @ 20:38)  HR: 70 (06-13-19 @ 05:31) (70 - 73)  BP: 127/76 (06-13-19 @ 05:31) (122/73 - 137/73)  RR: 15 (06-12-19 @ 20:38) (14 - 15)  SpO2: 97% (06-12-19 @ 20:38) (97% - 97%)    I&O's Summary    12 Jun 2019 07:01  -  13 Jun 2019 07:00  --------------------------------------------------------  IN: 400 mL / OUT: 400 mL / NET: 0 mL      PHYSICAL EXAM:  General: NAD  Head: NC/AT  ENT: MMM  Neck: Supple, No JVD  Lungs: Clear to percussion bilaterally  Cardio: RRR, S1/S2, No murmurs  Abdomen: Soft, Nontender, Nondistended; Bowel sounds present  Neuro: no new deficits  Extremities: No clubbing, cyanosis, or edema  Psych: calm    LABS:    06-13    137  |  102  |  23  ----------------------------<  98  4.2   |  30  |  1.06    Ca    9.0      13 Jun 2019 05:40          RADIOLOGY & ADDITIONAL TESTS:    Consultant(s) Notes Reviewed:  [x] YES  [ ] NO    Care Discussed with Consultants/Other Providers [x] YES  [ ] NO Patient is a 73 year old male who presents with a chief complaint of SDH (12 Jun 2019 15:37)    Patient seen and examined at bedside, no complaints    MEDICATIONS  (STANDING):  amLODIPine   Tablet 5 milliGRAM(s) Oral daily  finasteride 5 milliGRAM(s) Oral daily  folic acid 1 milliGRAM(s) Oral daily  hydrocortisone 1% Ointment 1 Application(s) Topical two times a day  levETIRAcetam 500 milliGRAM(s) Oral two times a day  ramelteon 8 milliGRAM(s) Oral at bedtime  tamsulosin 0.4 milliGRAM(s) Oral at bedtime  thiamine 100 milliGRAM(s) Oral daily    MEDICATIONS  (PRN):  acetaminophen   Tablet .. 650 milliGRAM(s) Oral every 6 hours PRN Temp greater or equal to 38C (100.4F), Mild Pain (1 - 3)  benzonatate 100 milliGRAM(s) Oral three times a day PRN Cough  LORazepam     Tablet 0.5 milliGRAM(s) Oral at bedtime PRN Agitation  meclizine 25 milliGRAM(s) Oral three times a day PRN Dizziness    REVIEW OF SYSTEMS:  CONSTITUTIONAL: No fever, weight loss, has fatigue  EYES: No eye pain, visual disturbances, or discharge  ENMT:  No difficulty hearing, tinnitus, vertigo; No sinus or throat pain  NECK: No pain or stiffness  RESPIRATORY: No cough, wheezing, chills or hemoptysis; No shortness of breath  CARDIOVASCULAR: No chest pain, palpitations, dizziness, or leg swelling  GASTROINTESTINAL: No abdominal or epigastric pain. No nausea, vomiting, or hematemesis; No diarrhea or constipation. No melena or hematochezia.  GENITOURINARY: No dysuria, frequency, hematuria, or incontinence  NEUROLOGICAL: No headaches, memory loss, loss of strength, numbness, or tremors  SKIN: No itching, burning, rashes, or lesions   ENDOCRINE: No heat or cold intolerance; No hair loss  MUSCULOSKELETAL: No joint pain or swelling; No muscle, back, or extremity pain  PSYCHIATRIC: No depression, anxiety, mood swings, or difficulty sleeping  HEME/LYMPH: No easy bruising, or bleeding gums  ALLERGY AND IMMUNOLOGIC: No hives or eczema      PHYSICAL EXAM:    T(C): 37 (06-12-19 @ 20:38), Max: 37 (06-12-19 @ 20:38)  HR: 70 (06-13-19 @ 05:31) (70 - 73)  BP: 127/76 (06-13-19 @ 05:31) (122/73 - 137/73)  RR: 15 (06-12-19 @ 20:38) (14 - 15)  SpO2: 97% (06-12-19 @ 20:38) (97% - 97%)    I&O's Summary    12 Jun 2019 07:01  -  13 Jun 2019 07:00  --------------------------------------------------------  IN: 400 mL / OUT: 400 mL / NET: 0 mL      PHYSICAL EXAM:  General: NAD  Head: NC/AT  ENT: MMM  Neck: Supple, No JVD  Lungs: Clear to percussion bilaterally  Cardio: RRR, S1/S2, No murmurs  Abdomen: Soft, Nontender, Nondistended; Bowel sounds present  Neuro: no new deficits  Extremities: No clubbing, cyanosis, or edema  Psych: calm    LABS:    06-13    137  |  102  |  23  ----------------------------<  98  4.2   |  30  |  1.06    Ca    9.0      13 Jun 2019 05:40          RADIOLOGY & ADDITIONAL TESTS:    Consultant(s) Notes Reviewed:  [x] YES  [ ] NO    Care Discussed with Consultants/Other Providers [x] YES  [ ] NO

## 2019-06-13 NOTE — PROGRESS NOTE ADULT - SUBJECTIVE AND OBJECTIVE BOX
HPI:  73 year old male with PMHx of BPH who was transferred from [facility] to [facility] on 5/28/19 after suffering a fall 2 days prior to admission. Patient reports that he was home and drinking. States that he was home alone and that his wife was on vacation. He remembers seeing the evidence of multiple falls at home with blood on the floor in a couple of areas. Patient states he does not remember when or how he fell. Wife reports that he was to pick her up from the airport the day after the fall/falls occurred and a neighbor decided that he was unsafe to drive.   Once his wife returned home, she brought him to the ER at [facility]. CT of the head showed acute bilateral SDH and SAH (bilateral SDH, SAH, tentorial hemorrhage and left occipital hemorrhage/contusion).  At [facility], he was also noted to have a seizure and received Keppra and Ativan. Patient was then transferred to [facility] for further evaluation. Patient did not need surgical intervention and was discharged home on 5/30/19. Keppra was continued to prevent further seizures.    Patient was readmitted to [facility] on 6/1/19. Wife reports that patient was found to be lethargic at home and had difficulty ambulating. Daughter came and had to "drag" him out to the car b/c he was unable to walk. Repeat CT of the head showed stable left occipital hemorrhage with stable small hemorrhages.   Neurology was consulted. EEG done without signs of seizure activity.   Patient found to have hyponatremia. Nephrology consulted. Will likely order tolvaptan.   PT consulted and found patient to need min A for ambulation yesterday with rolling walker.   OT consulted and found patient ot be supervision to  for ADLs.    Admitted to rehab 6/7/19. (07 Jun 2019 14:55)      PAST MEDICAL & SURGICAL HISTORY:  ICH (intracerebral hemorrhage)  BPH (benign prostatic hyperplasia)  No significant past surgical history      Subjective:  No acute events overnight reported by the night team resident or nursing. Patient seen and evaluated in PT. Doing well, has no complaints other than sleep could be better because he is not home. No pain. Regular Bowel movements, no dysuria. Denies pain or fever     REVIEW OF SYMPTOMS  Neurological deficits    VITALS  Vital Signs Last 24 Hrs  T(C): 36.9 (13 Jun 2019 07:33), Max: 37 (12 Jun 2019 20:38)  T(F): 98.5 (13 Jun 2019 07:33), Max: 98.6 (12 Jun 2019 20:38)  HR: 77 (13 Jun 2019 07:33) (70 - 77)  BP: 142/82 (13 Jun 2019 07:33) (122/73 - 142/82)  BP(mean): --  RR: 14 (13 Jun 2019 07:33) (14 - 15)  SpO2: 95% (13 Jun 2019 07:33) (95% - 97%)    PHYSICAL EXAM  Constitutional - NAD, Comfortable  	HEENT - PERRLA, no nystagmus or  saccades  	Chest - CTA throughout   	Cardiovascular -  S1S2, no murmurs   	Abdomen - Soft, Non-distended, +BS   	Extremities - no edema   	Neurologic Exam -                 	   Cognitive - AAO X 3 	  	   Communication - Fluent, No dysarthria, Naming intact  	   Attention -  able to recite months of year backwards,  +difficulty with serial 7s  	   Memory -  able to recall 3/3 items after 3 minutes,   	   Cranial Nerves - CN 2-12 grossly intact  	   Motor - NFD  	   Sensory - Intact to light touch bilat upper and lower extremities   	   Coordination - Finger-to-nose intact bilaterally, HTS intact bilaterally   Psychiatric -  affect wnl  RECENT LABS      06-13    137  |  102  |  23  ----------------------------<  98  4.2   |  30  |  1.06    Ca    9.0      13 Jun 2019 05:40        06-10    134<L>  |  99  |  24<H>  ----------------------------<  128<H>  4.0   |  26  |  1.14    Ca    8.7      10 Michael 2019 05:40              RADIOLOGY/OTHER RESULTS    MEDICATIONS  (STANDING):  amLODIPine   Tablet 5 milliGRAM(s) Oral daily  finasteride 5 milliGRAM(s) Oral daily  folic acid 1 milliGRAM(s) Oral daily  hydrocortisone 1% Ointment 1 Application(s) Topical two times a day  levETIRAcetam 500 milliGRAM(s) Oral two times a day  ramelteon 8 milliGRAM(s) Oral at bedtime  tamsulosin 0.4 milliGRAM(s) Oral at bedtime  thiamine 100 milliGRAM(s) Oral daily    MEDICATIONS  (PRN):  acetaminophen   Tablet .. 650 milliGRAM(s) Oral every 6 hours PRN Temp greater or equal to 38C (100.4F), Mild Pain (1 - 3)  benzonatate 100 milliGRAM(s) Oral three times a day PRN Cough  LORazepam     Tablet 0.5 milliGRAM(s) Oral at bedtime PRN Agitation

## 2019-06-14 ENCOUNTER — TRANSCRIPTION ENCOUNTER (OUTPATIENT)
Age: 74
End: 2019-06-14

## 2019-06-14 PROCEDURE — 99233 SBSQ HOSP IP/OBS HIGH 50: CPT

## 2019-06-14 PROCEDURE — 99232 SBSQ HOSP IP/OBS MODERATE 35: CPT

## 2019-06-14 RX ORDER — TAMSULOSIN HYDROCHLORIDE 0.4 MG/1
2 CAPSULE ORAL
Qty: 60 | Refills: 0
Start: 2019-06-14 | End: 2019-07-13

## 2019-06-14 RX ORDER — THIAMINE MONONITRATE (VIT B1) 100 MG
1 TABLET ORAL
Qty: 30 | Refills: 0
Start: 2019-06-14 | End: 2019-07-13

## 2019-06-14 RX ORDER — LEVETIRACETAM 250 MG/1
1 TABLET, FILM COATED ORAL
Qty: 60 | Refills: 0
Start: 2019-06-14 | End: 2019-07-13

## 2019-06-14 RX ORDER — FOLIC ACID 0.8 MG
1 TABLET ORAL
Qty: 30 | Refills: 0
Start: 2019-06-14

## 2019-06-14 RX ORDER — BACITRACIN ZINC 500 UNIT/G
1 OINTMENT IN PACKET (EA) TOPICAL ONCE
Refills: 0 | Status: COMPLETED | OUTPATIENT
Start: 2019-06-14 | End: 2019-06-14

## 2019-06-14 RX ORDER — TAMSULOSIN HYDROCHLORIDE 0.4 MG/1
0.8 CAPSULE ORAL AT BEDTIME
Refills: 0 | Status: DISCONTINUED | OUTPATIENT
Start: 2019-06-14 | End: 2019-06-15

## 2019-06-14 RX ORDER — TAMSULOSIN HYDROCHLORIDE 0.4 MG/1
1 CAPSULE ORAL
Qty: 0 | Refills: 0 | DISCHARGE

## 2019-06-14 RX ORDER — FINASTERIDE 5 MG/1
1 TABLET, FILM COATED ORAL
Qty: 30 | Refills: 0
Start: 2019-06-14 | End: 2019-07-13

## 2019-06-14 RX ORDER — AMLODIPINE BESYLATE 2.5 MG/1
1 TABLET ORAL
Qty: 30 | Refills: 0
Start: 2019-06-14 | End: 2019-07-13

## 2019-06-14 RX ADMIN — AMLODIPINE BESYLATE 5 MILLIGRAM(S): 2.5 TABLET ORAL at 06:05

## 2019-06-14 RX ADMIN — FINASTERIDE 5 MILLIGRAM(S): 5 TABLET, FILM COATED ORAL at 12:19

## 2019-06-14 RX ADMIN — Medication 1 APPLICATION(S): at 06:06

## 2019-06-14 RX ADMIN — Medication 1 MILLIGRAM(S): at 12:20

## 2019-06-14 RX ADMIN — LEVETIRACETAM 500 MILLIGRAM(S): 250 TABLET, FILM COATED ORAL at 06:04

## 2019-06-14 RX ADMIN — Medication 1 APPLICATION(S): at 14:26

## 2019-06-14 RX ADMIN — LEVETIRACETAM 500 MILLIGRAM(S): 250 TABLET, FILM COATED ORAL at 17:03

## 2019-06-14 RX ADMIN — Medication 8 MILLIGRAM(S): at 21:22

## 2019-06-14 RX ADMIN — TAMSULOSIN HYDROCHLORIDE 0.8 MILLIGRAM(S): 0.4 CAPSULE ORAL at 21:23

## 2019-06-14 RX ADMIN — Medication 100 MILLIGRAM(S): at 12:20

## 2019-06-14 RX ADMIN — Medication 1 APPLICATION(S): at 17:24

## 2019-06-14 NOTE — PROGRESS NOTE ADULT - SUBJECTIVE AND OBJECTIVE BOX
Patient is a 73 year old  male who presents with a chief complaint of SDH (14 Jun 2019 11:16)    Patient seen and examined at bedside, no complaints      MEDICATIONS  (STANDING):  amLODIPine   Tablet 5 milliGRAM(s) Oral daily  finasteride 5 milliGRAM(s) Oral daily  folic acid 1 milliGRAM(s) Oral daily  hydrocortisone 1% Ointment 1 Application(s) Topical two times a day  levETIRAcetam 500 milliGRAM(s) Oral two times a day  ramelteon 8 milliGRAM(s) Oral at bedtime  tamsulosin 0.8 milliGRAM(s) Oral at bedtime  thiamine 100 milliGRAM(s) Oral daily    MEDICATIONS  (PRN):  acetaminophen   Tablet .. 650 milliGRAM(s) Oral every 6 hours PRN Temp greater or equal to 38C (100.4F), Mild Pain (1 - 3)  benzonatate 100 milliGRAM(s) Oral three times a day PRN Cough  LORazepam     Tablet 0.5 milliGRAM(s) Oral at bedtime PRN Agitation    REVIEW OF SYSTEMS:  CONSTITUTIONAL: No fever, weight loss, has fatigue  EYES: No eye pain, visual disturbances, or discharge  ENMT:  No difficulty hearing, tinnitus, vertigo; No sinus or throat pain  NECK: No pain or stiffness  RESPIRATORY: No cough, wheezing, chills or hemoptysis; No shortness of breath  CARDIOVASCULAR: No chest pain, palpitations, dizziness, or leg swelling  GASTROINTESTINAL: No abdominal or epigastric pain. No nausea, vomiting, or hematemesis; No diarrhea or constipation. No melena or hematochezia.  GENITOURINARY: No dysuria, frequency, hematuria, or incontinence  NEUROLOGICAL: No headaches, memory loss, loss of strength, numbness, or tremors  SKIN: No itching, burning, rashes, or lesions   ENDOCRINE: No heat or cold intolerance; No hair loss  MUSCULOSKELETAL: No joint pain or swelling; No muscle, back, or extremity pain  PSYCHIATRIC: No depression, anxiety, mood swings, or difficulty sleeping  HEME/LYMPH: No easy bruising, or bleeding gums  ALLERGY AND IMMUNOLOGIC: No hives or eczema    PHYSICAL EXAM:    T(C): 36.6 (06-14-19 @ 07:40), Max: 36.9 (06-13-19 @ 19:47)  HR: 84 (06-14-19 @ 07:40) (76 - 84)  BP: 135/81 (06-14-19 @ 07:40) (128/77 - 135/81)  RR: 14 (06-14-19 @ 07:40) (14 - 14)  SpO2: 98% (06-14-19 @ 07:40) (96% - 98%)    I&O's Summary    13 Jun 2019 07:01  -  14 Jun 2019 07:00  --------------------------------------------------------  IN: 500 mL / OUT: 700 mL / NET: -200 mL      PHYSICAL EXAM:  General: NAD  Head: NC/AT  ENT: MMM  Neck: Supple, No JVD  Lungs: Clear to percussion bilaterally  Cardio: RRR, S1/S2, No murmurs  Abdomen: Soft, Nontender, Nondistended; Bowel sounds present  Neuro: no new deficits  Extremities: No clubbing, cyanosis, or edema, right forearm stitch in place  Psych: calm        LABS:    06-13    137  |  102  |  23  ----------------------------<  98  4.2   |  30  |  1.06    Ca    9.0      13 Jun 2019 05:40            RADIOLOGY & ADDITIONAL TESTS:      Consultant(s) Notes Reviewed:  [x] YES  [ ] NO    Care Discussed with Consultants/Other Providers [x] YES  [ ] NO

## 2019-06-14 NOTE — PROGRESS NOTE ADULT - SUBJECTIVE AND OBJECTIVE BOX
No distress    Vital Signs Last 24 Hrs  T(C): 36.6 (06-14-19 @ 07:40), Max: 36.9 (06-13-19 @ 19:47)  T(F): 97.9 (06-14-19 @ 07:40), Max: 98.5 (06-13-19 @ 19:47)  HR: 84 (06-14-19 @ 07:40) (76 - 84)  BP: 135/81 (06-14-19 @ 07:40) (128/77 - 135/81)  RR: 14 (06-14-19 @ 07:40) (14 - 14)  SpO2: 98% (06-14-19 @ 07:40) (96% - 98%)    Lungs clear to auscultation  Card S12S2  Abdomen soft, NT, ND  Extr no edema                                     13 Jun 2019 05:40    137    |  102    |  23     ----------------------------<  98     4.2     |  30     |  1.06     Ca    9.0        13 Jun 2019 05:40    acetaminophen   Tablet .. 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

## 2019-06-14 NOTE — DISCHARGE NOTE PROVIDER - PROVIDER TOKENS
PROVIDER:[TOKEN:[[id number]]],PROVIDER:[TOKEN:[[id number]]],PROVIDER:[TOKEN:[[id number]]]

## 2019-06-14 NOTE — DISCHARGE NOTE PROVIDER - CARE PROVIDERS DIRECT ADDRESSES
[email or web address]

## 2019-06-14 NOTE — DISCHARGE NOTE PROVIDER - HOSPITAL COURSE
73 year old male with PMHx of BPH who was transferred from [facility] to [facility] on 5/28/19 after suffering a fall 2 days prior to admission. Patient reports that he was home and drinking. States that he was home alone and that his wife was on vacation. He remembers seeing the evidence of multiple falls at home with blood on the floor in a couple of areas. Patient states he does not remember when or how he fell. Wife reports that he was to pick her up from the airport the day after the fall/falls occurred and a neighbor decided that he was unsafe to drive.     Once his wife returned home, she brought him to the ER at [facility]. CT of the head showed acute bilateral SDH and SAH (bilateral SDH, SAH, tentorial hemorrhage and left occipital hemorrhage/contusion).  At [facility], he was also noted to have a seizure and received Keppra and Ativan. Patient was then transferred to [facility] for further evaluation. Patient did not need surgical intervention and was discharged home on 5/30/19. Keppra was continued to prevent further seizures.      Patient was readmitted to [facility] on 6/1/19. Wife reports that patient was found to be lethargic at home and had difficulty ambulating. Daughter came and had to "drag" him out to the car b/c he was unable to walk. Repeat CT of the head showed stable left occipital hemorrhage with stable small hemorrhages.     Neurology was consulted. EEG done without signs of seizure activity.     Patient found to have hyponatremia. Nephrology consulted.            Admitted to acute rehab 6/7/19.  Made great progress in therapy.  Rehabilitation course was uncomplicated.  Hyponatremia resolved. Pt. discharge function is Moderate Independent in ambulation, stairs, & ADLs.

## 2019-06-14 NOTE — PROGRESS NOTE ADULT - ATTENDING COMMENTS
Rachelle Ledesma MD
Pt. seen with resident.  Agree with documentation above as per resident with amendments made as appropriate. Patient medically stable. Making progress towards rehab goals.
Rachelle Ledesma MD

## 2019-06-14 NOTE — DISCHARGE NOTE PROVIDER - NSDCACTIVITY_GEN_ALL_CORE
Showering allowed/Bathing allowed/Walking - Outdoors allowed/Sex allowed/Do not drive or operate machinery/Stairs allowed/Walking - Indoors allowed

## 2019-06-14 NOTE — PROGRESS NOTE ADULT - ASSESSMENT
ASSESSMENT/PLAN  74 y/o male with an unwitnessed fall suffering bilateral SDH and SAH, managed non-operatively, with subsequent fall and hyponatremia likely secondary to TBI, with mild cognitive and functional deficits.     COMORBIDITIES / ACTIVE MEDICAL ISSUES     Gait Instability, ADL impairments and Functional impairments:   - cont Comprehensive Rehab Program of PT/OT   - Pain Mgmt - Tylenol PRN,   - GI/Bowel Mgmt -  Colace, Senna,  Dulcolax PRN, Miralax PRN,  - /Bladder Mgmt -  monitor     - Insomnia -  Rozerem 8mg  - seizure d/o--, --Keppra  - SLP - treatment    HTN--amlodipine.  BP controlled     BPH--finasteride, increase flomax to 8mg. Pt. to f/u with Urologist, Dr. Calderon as outpatient    ETOH dependence--folate, thiamine    FEN   - Diet - Regular + Thins    - hyponatremia resolving.  Pt. off fluid restriction      Team meeting--Supervision with mobility and ADLs--will be mod I by weekend.  d/c home 6/15

## 2019-06-14 NOTE — PROGRESS NOTE ADULT - ASSESSMENT
72 y/o male with an unwitnessed fall suffering bilateral SDH and SAH, managed non-operatively, with subsequent fall and hyponatremia likely secondary to TBI, with mild cognitive and functional deficits.     # B/L SDH and JONN (no intervention, medical management)  - Comprehensive Rehab Program of PT/OT/rehab/SLP  - seizure prophylaxis c/w Keppra    # HTN  - BP controlled on amlodipine    # BPH  - c/w finasteride, flomax    # ETOH dependence  - c/w folic acid, thiamine    #Right forearm stitch  - for removal by primary tram, d/w Dr. Spann    # DVT ppx  - venodynes

## 2019-06-14 NOTE — DISCHARGE NOTE PROVIDER - NSDCCPCAREPLAN_GEN_ALL_CORE_FT
PRINCIPAL DISCHARGE DIAGNOSIS  Diagnosis: SDH (subdural hematoma)  Assessment and Plan of Treatment:

## 2019-06-14 NOTE — DISCHARGE NOTE PROVIDER - NSDCFUADDINST_GEN_ALL_CORE_FT
follow up with Dr. Cazares in 4-5 weeks at [facility]    supervision with outdoor walking and with bathing

## 2019-06-14 NOTE — PROGRESS NOTE ADULT - SUBJECTIVE AND OBJECTIVE BOX
HPI:  73 year old male with PMHx of BPH who was transferred from [facility] to [facility] on 5/28/19 after suffering a fall 2 days prior to admission. Patient reports that he was home and drinking. States that he was home alone and that his wife was on vacation. He remembers seeing the evidence of multiple falls at home with blood on the floor in a couple of areas. Patient states he does not remember when or how he fell. Wife reports that he was to pick her up from the airport the day after the fall/falls occurred and a neighbor decided that he was unsafe to drive.   Once his wife returned home, she brought him to the ER at [facility]. CT of the head showed acute bilateral SDH and SAH (bilateral SDH, SAH, tentorial hemorrhage and left occipital hemorrhage/contusion).  At [facility], he was also noted to have a seizure and received Keppra and Ativan. Patient was then transferred to [facility] for further evaluation. Patient did not need surgical intervention and was discharged home on 5/30/19. Keppra was continued to prevent further seizures.    Patient was readmitted to [facility] on 6/1/19. Wife reports that patient was found to be lethargic at home and had difficulty ambulating. Daughter came and had to "drag" him out to the car b/c he was unable to walk. Repeat CT of the head showed stable left occipital hemorrhage with stable small hemorrhages.   Neurology was consulted. EEG done without signs of seizure activity.   Patient found to have hyponatremia. Nephrology consulted. Will likely order tolvaptan.   PT consulted and found patient to need min A for ambulation yesterday with rolling walker.   OT consulted and found patient ot be supervision to  for ADLs.    Admitted to rehab 6/7/19. (07 Jun 2019 14:55)      PAST MEDICAL & SURGICAL HISTORY:  ICH (intracerebral hemorrhage)  BPH (benign prostatic hyperplasia)  No significant past surgical history      Subjective:  pt. c/o frequent urination of small amounts during the night.  Has long h/o of BPH.  states he was taking 2 tabs of flomax at home.  Also asking when suture in right UE is to be removed.    REVIEW OF SYMPTOMS  Neurological deficits      VITALS  Vital Signs Last 24 Hrs  T(C): 36.6 (14 Jun 2019 07:40), Max: 36.9 (13 Jun 2019 19:47)  T(F): 97.9 (14 Jun 2019 07:40), Max: 98.5 (13 Jun 2019 19:47)  HR: 84 (14 Jun 2019 07:40) (76 - 84)  BP: 135/81 (14 Jun 2019 07:40) (128/77 - 135/81)  BP(mean): --  RR: 14 (14 Jun 2019 07:40) (14 - 14)  SpO2: 98% (14 Jun 2019 07:40) (96% - 98%)      PHYSICAL EXAM  Constitutional - NAD, Comfortable  	HEENT - PERRLA, no nystagmus or  saccades  	Chest - CTA throughout   	Cardiovascular -  S1S2, no murmurs   	Abdomen - Soft, Non-distended, +BS   	Extremities - no edema, 1 suture posterior forearm on right  	Neurologic Exam -                 	   Cognitive - AAO X 3 	  	   Communication - Fluent, No dysarthria, Naming intact  	   Attention -  able to recite months of year backwards,  +difficulty with serial 7s  	   Memory -  able to recall 3/3 items after 3 minutes,   	   Cranial Nerves - CN 2-12 grossly intact  	   Motor -stable 5/5 bilat.  	   Sensory - Intact to light touch bilat upper and lower extremities   	   Coordination - Finger-to-nose intact bilaterally, HTS intact bilaterally   Psychiatric -  affect wnl      RECENT LABS    06-13    137  |  102  |  23  ----------------------------<  98  4.2   |  30  |  1.06    Ca    9.0      13 Jun 2019 05:40              RADIOLOGY/OTHER RESULTS      MEDICATIONS  (STANDING):  amLODIPine   Tablet 5 milliGRAM(s) Oral daily  finasteride 5 milliGRAM(s) Oral daily  folic acid 1 milliGRAM(s) Oral daily  hydrocortisone 1% Ointment 1 Application(s) Topical two times a day  levETIRAcetam 500 milliGRAM(s) Oral two times a day  ramelteon 8 milliGRAM(s) Oral at bedtime  tamsulosin 0.4 milliGRAM(s) Oral at bedtime  thiamine 100 milliGRAM(s) Oral daily    MEDICATIONS  (PRN):  acetaminophen   Tablet .. 650 milliGRAM(s) Oral every 6 hours PRN Temp greater or equal to 38C (100.4F), Mild Pain (1 - 3)  benzonatate 100 milliGRAM(s) Oral three times a day PRN Cough  LORazepam     Tablet 0.5 milliGRAM(s) Oral at bedtime PRN Agitation

## 2019-06-14 NOTE — DISCHARGE NOTE PROVIDER - CARE PROVIDER_API CALL
Gin Fontaine)  Family Medicine  [address] [place] [address], [address]  [place] [address]  Phone: [phone]  Fax: [phone]  Follow Up Time:     Inder Calderon)  Urology  [address] [place] [address], [address]  [place], [place] [id number]  Phone: [phone]  Fax: [phone]  Follow Up Time:     Sybil Cazares (DO)  Brain Injury Medicine; PhysicalRehab Medicine  [address] [place] [address]  Phone: [phone]  Fax: [phone]  Follow Up Time:

## 2019-06-15 ENCOUNTER — TRANSCRIPTION ENCOUNTER (OUTPATIENT)
Age: 74
End: 2019-06-15

## 2019-06-15 VITALS
SYSTOLIC BLOOD PRESSURE: 120 MMHG | RESPIRATION RATE: 14 BRPM | TEMPERATURE: 98 F | DIASTOLIC BLOOD PRESSURE: 73 MMHG | HEART RATE: 98 BPM | OXYGEN SATURATION: 98 %

## 2019-06-15 PROCEDURE — 99238 HOSP IP/OBS DSCHRG MGMT 30/<: CPT

## 2019-06-15 RX ADMIN — AMLODIPINE BESYLATE 5 MILLIGRAM(S): 2.5 TABLET ORAL at 05:19

## 2019-06-15 RX ADMIN — LEVETIRACETAM 500 MILLIGRAM(S): 250 TABLET, FILM COATED ORAL at 05:19

## 2019-06-15 RX ADMIN — Medication 1 APPLICATION(S): at 08:12

## 2019-06-15 NOTE — PROGRESS NOTE ADULT - PROVIDER SPECIALTY LIST ADULT
Hospitalist
Nephrology
Rehab Medicine
Nephrology
Rehab Medicine
Hospitalist
Rehab Medicine
Rehab Medicine
Hospitalist

## 2019-06-15 NOTE — PROGRESS NOTE ADULT - SUBJECTIVE AND OBJECTIVE BOX
Cc: Gait dysfunction    HPI: Patient with no new medical issues today.  Pain controlled, no chest pain, no N/V, no Fevers/Chills. No other new ROS  Eager for discharge home.     acetaminophen   Tablet .. 650 milliGRAM(s) Oral every 6 hours PRN  amLODIPine   Tablet 5 milliGRAM(s) Oral daily  benzonatate 100 milliGRAM(s) Oral three times a day PRN  finasteride 5 milliGRAM(s) Oral daily  folic acid 1 milliGRAM(s) Oral daily  hydrocortisone 1% Ointment 1 Application(s) Topical two times a day  levETIRAcetam 500 milliGRAM(s) Oral two times a day  ramelteon 8 milliGRAM(s) Oral at bedtime  tamsulosin 0.8 milliGRAM(s) Oral at bedtime  thiamine 100 milliGRAM(s) Oral daily      T(C): 36.4 (06-14-19 @ 19:38), Max: 36.4 (06-14-19 @ 19:38)  HR: 65 (06-15-19 @ 05:20) (65 - 84)  BP: 113/65 (06-15-19 @ 05:20) (113/65 - 129/73)  RR: 14 (06-14-19 @ 19:38) (14 - 14)  SpO2: 95% (06-14-19 @ 19:38) (95% - 95%)    In NAD  HEENT- EOMI  Heart- RRR, S1S2  Lungs- CTA bl.  Abd- + BS, NT  Ext- No calf pain  Neuro- Exam unchanged          Imp: Patient with diagnosis of TBI admitted for comprehensive acute rehabilitation.    Plan:  For discharge today.  Discharge plan and medications reviewed with primary team

## 2019-06-17 ENCOUNTER — OUTPATIENT (OUTPATIENT)
Dept: OUTPATIENT SERVICES | Facility: HOSPITAL | Age: 74
LOS: 1 days | End: 2019-06-17
Payer: COMMERCIAL

## 2019-06-17 DIAGNOSIS — Z73.6 LIMITATION OF ACTIVITIES DUE TO DISABILITY: ICD-10-CM

## 2019-06-18 DIAGNOSIS — I62.01 NONTRAUMATIC ACUTE SUBDURAL HEMORRHAGE: ICD-10-CM

## 2019-06-21 DIAGNOSIS — F10.10 ALCOHOL ABUSE, UNCOMPLICATED: ICD-10-CM

## 2019-06-21 DIAGNOSIS — S06.5X0D TRAUMATIC SUBDURAL HEMORRHAGE WITHOUT LOSS OF CONSCIOUSNESS, SUBSEQUENT ENCOUNTER: ICD-10-CM

## 2019-06-21 DIAGNOSIS — R26.9 UNSPECIFIED ABNORMALITIES OF GAIT AND MOBILITY: ICD-10-CM

## 2019-06-21 DIAGNOSIS — I10 ESSENTIAL (PRIMARY) HYPERTENSION: ICD-10-CM

## 2019-06-21 DIAGNOSIS — E22.2 SYNDROME OF INAPPROPRIATE SECRETION OF ANTIDIURETIC HORMONE: ICD-10-CM

## 2019-06-21 DIAGNOSIS — S06.6X0D TRAUMATIC SUBARACHNOID HEMORRHAGE WITHOUT LOSS OF CONSCIOUSNESS, SUBSEQUENT ENCOUNTER: ICD-10-CM

## 2019-06-21 DIAGNOSIS — R41.89 OTHER SYMPTOMS AND SIGNS INVOLVING COGNITIVE FUNCTIONS AND AWARENESS: ICD-10-CM
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
## 2019-06-21 DIAGNOSIS — G47.00 INSOMNIA, UNSPECIFIED: ICD-10-CM

## 2019-06-21 DIAGNOSIS — N40.0 BENIGN PROSTATIC HYPERPLASIA WITHOUT LOWER URINARY TRACT SYMPTOMS: ICD-10-CM

## 2019-06-21 DIAGNOSIS — Z91.81 HISTORY OF FALLING: ICD-10-CM
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
## 2019-06-21 DIAGNOSIS — Z51.89 ENCOUNTER FOR OTHER SPECIFIED AFTERCARE: ICD-10-CM

## 2019-06-21 DIAGNOSIS — E87.1 HYPO-OSMOLALITY AND HYPONATREMIA: ICD-10-CM

## 2019-06-21 PROCEDURE — 80048 BASIC METABOLIC PNL TOTAL CA: CPT

## 2019-06-21 PROCEDURE — 97535 SELF CARE MNGMENT TRAINING: CPT

## 2019-06-21 PROCEDURE — 36415 COLL VENOUS BLD VENIPUNCTURE: CPT

## 2019-06-21 PROCEDURE — 92523 SPEECH SOUND LANG COMPREHEN: CPT

## 2019-06-21 PROCEDURE — 97110 THERAPEUTIC EXERCISES: CPT

## 2019-06-21 PROCEDURE — 85027 COMPLETE CBC AUTOMATED: CPT

## 2019-06-21 PROCEDURE — 80053 COMPREHEN METABOLIC PANEL: CPT

## 2019-06-21 PROCEDURE — 97163 PT EVAL HIGH COMPLEX 45 MIN: CPT

## 2019-06-21 PROCEDURE — 97530 THERAPEUTIC ACTIVITIES: CPT

## 2019-06-21 PROCEDURE — 92610 EVALUATE SWALLOWING FUNCTION: CPT

## 2019-06-21 PROCEDURE — 97167 OT EVAL HIGH COMPLEX 60 MIN: CPT

## 2019-06-21 PROCEDURE — 92507 TX SP LANG VOICE COMM INDIV: CPT

## 2019-06-21 PROCEDURE — 97116 GAIT TRAINING THERAPY: CPT

## 2019-06-25 ENCOUNTER — OUTPATIENT (OUTPATIENT)
Dept: OUTPATIENT SERVICES | Facility: HOSPITAL | Age: 74
LOS: 1 days | End: 2019-06-25
Payer: COMMERCIAL

## 2019-06-25 DIAGNOSIS — I62.00 NONTRAUMATIC SUBDURAL HEMORRHAGE, UNSPECIFIED: ICD-10-CM

## 2019-06-25 PROCEDURE — 70450 CT HEAD/BRAIN W/O DYE: CPT | Mod: 26

## 2019-06-25 PROCEDURE — 70450 CT HEAD/BRAIN W/O DYE: CPT

## 2019-06-27 PROCEDURE — 97162 PT EVAL MOD COMPLEX 30 MIN: CPT

## 2019-06-27 PROCEDURE — 92507 TX SP LANG VOICE COMM INDIV: CPT

## 2019-06-27 PROCEDURE — 97112 NEUROMUSCULAR REEDUCATION: CPT

## 2019-06-27 PROCEDURE — 97110 THERAPEUTIC EXERCISES: CPT

## 2019-06-27 PROCEDURE — 97166 OT EVAL MOD COMPLEX 45 MIN: CPT

## 2019-06-27 PROCEDURE — 97116 GAIT TRAINING THERAPY: CPT

## 2019-06-27 PROCEDURE — 92523 SPEECH SOUND LANG COMPREHEN: CPT

## 2019-07-10 ENCOUNTER — APPOINTMENT (OUTPATIENT)
Dept: PHYSICAL MEDICINE AND REHAB | Facility: CLINIC | Age: 74
End: 2019-07-10

## 2019-07-11 ENCOUNTER — APPOINTMENT (OUTPATIENT)
Dept: PHYSICAL MEDICINE AND REHAB | Facility: CLINIC | Age: 74
End: 2019-07-11
Payer: COMMERCIAL

## 2019-07-11 VITALS
HEART RATE: 90 BPM | WEIGHT: 144 LBS | HEIGHT: 66.5 IN | DIASTOLIC BLOOD PRESSURE: 91 MMHG | BODY MASS INDEX: 22.87 KG/M2 | OXYGEN SATURATION: 98 % | SYSTOLIC BLOOD PRESSURE: 156 MMHG

## 2019-07-11 PROCEDURE — 99214 OFFICE O/P EST MOD 30 MIN: CPT

## 2019-07-12 ENCOUNTER — RX RENEWAL (OUTPATIENT)
Age: 74
End: 2019-07-12

## 2019-07-15 NOTE — HISTORY OF PRESENT ILLNESS
[FreeTextEntry1] : 73 year old male presents for f/u \par \par Pt. with BPH who was transferred from [facility] to [facility] on 5/28/19 after suffering a fall 2 days prior to admission. Patient reports that he was home and drinking. States that he was home alone and that his wife was on vacation. He remembers seeing the evidence of multiple falls at home with blood on the floor in a couple of areas. Patient states he does not remember when or how he fell. Wife reports that he was to pick her up from the airport the day after the fall/falls occurred and a neighbor decided that he was unsafe to drive.\par  Once his wife returned home, she brought him to the ER at [facility]. CT of the head showed acute bilateral SDH and SAH (bilateral SDH, SAH, tentorial hemorrhage and left occipital hemorrhage/contusion).  At [facility], he was also noted to have a seizure and received Keppra and Ativan. Patient was then transferred to [facility] for further evaluation. Patient did not need surgical intervention and was discharged home on 5/30/19. Keppra was continued to prevent further seizures.  \par \par Patient was readmitted to [facility] on 6/1/19. Wife reports that patient was found to be lethargic at home and had difficulty ambulating. Daughter came and had to "drag" him out to the car b/c he was unable to walk. Repeat CT of the head showed stable left occipital hemorrhage with stable small hemorrhages. \par Neurology was consulted. EEG done without signs of seizure activity. \par Patient found to have hyponatremia. Nephrology consulted.\par \par Admitted to acute rehab 6/7/19.  Made great progress in therapy.  Rehabilitation course was uncomplicated.  Hyponatremia resolved. Pt. discharge function is Moderate Independent in ambulation, stairs, & ADLs. \par \par Discharged from Acute Rehabilitation 6/14/19.   \par Started outpatient PT and speech.   \par \par Speech therapy is going well.  Making progress. Functioning back at his baseline.  ST completed. \par \par PT--Has been making progress.  Ambulating independently inside and outside home without AD.  Independent in ADLs.  able to negotiate stairs.  fair balance.   At times limited by dizziness, but meeting goals of outpatient PT.  \par \par \par Pt. c/o dizziness -pt. was seen in PT session a couple weeks ago c/o dizziness when standing or changing position.  Orthostatics were performed and Positive.  Pt. was referred to his PCP to adjust BP meds and further assessment. \par He was tapered off amlodipine.   Despite this he reports minimal improvement in his symptoms. \par \par  Currently his dizziness is not constant, but with "complex movement" such as sitting up and turning.  States just sitting up or standing does not cause it. \par \par Happens when going from lying to standing with turns\par \par reaching and looking up or down triggers dizziness.   Does not feel like room spinning but off balance. \par \par No neck pain.  No vision changes. \par

## 2019-07-15 NOTE — PHYSICAL EXAM
[FreeTextEntry1] : Lying \par 145/70\par Sitting\par 132/80\par \par Standing \par 128/74\par \par Constitutional: Alert, awake, no acute distress\par HEENT: EOMI, NC/AT, neck supple\par Cardiovascular: All extremities warm and well perfused\par Pulmonary: No respiratory distress, normal chest wall expansion\par Gastrointestinal: No abdominal distention\par \par Neuro: \par AAOx3, \par \par CN: intact no nystagmus, visual fields intact, PERRLA, EOMI, no facial weakness or sensory deficit, shoulder gerhard intact, tongue midline\par VOR testing--Slight  nystagmus\par near far accommodation--tolerates without symptoms\par focusing on stationary thumb with head rotation in horizontal or vertical plane--tolerates w/o symptoms\par Motor 5/5 bilat UE and LE\par Sens intact bilat UE and LE\par Coordination intact--FNF and HTS intact\par Gait normal\par Balance: mild difficulty with tandem gait \par -- ambulating with horizontal or vertical vestibular input (head turning and head nodding) --causes some mild dizziness\par \par Lashon Lee-- No nystagmus.  +c/o dizziness with technique

## 2019-07-15 NOTE — ASSESSMENT
[FreeTextEntry1] : Based on exam today and pt's complaints,  I feel that pt. has some mild vestibular dysfunction as a result of his TBI.  He will benefit from Vestibular therapy.  New PT Rx written for Vestibular therapy. 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  Pt. Not cleared to return to work with current vestibular and balance issues.  Short-term disability paperwork completed.  \par Pt. estimated Return to work on 9/16/19.

## 2019-07-26 ENCOUNTER — RX RENEWAL (OUTPATIENT)
Age: 74
End: 2019-07-26

## 2019-08-11 ENCOUNTER — FORM ENCOUNTER (OUTPATIENT)
Age: 74
End: 2019-08-11

## 2019-08-12 ENCOUNTER — OUTPATIENT (OUTPATIENT)
Dept: OUTPATIENT SERVICES | Facility: HOSPITAL | Age: 74
LOS: 1 days | End: 2019-08-12
Payer: COMMERCIAL

## 2019-08-12 ENCOUNTER — APPOINTMENT (OUTPATIENT)
Dept: CT IMAGING | Facility: HOSPITAL | Age: 74
End: 2019-08-12

## 2019-08-12 DIAGNOSIS — Z00.8 ENCOUNTER FOR OTHER GENERAL EXAMINATION: ICD-10-CM

## 2019-08-12 PROCEDURE — 70450 CT HEAD/BRAIN W/O DYE: CPT | Mod: 26

## 2019-08-12 PROCEDURE — 70450 CT HEAD/BRAIN W/O DYE: CPT

## 2019-08-15 ENCOUNTER — APPOINTMENT (OUTPATIENT)
Dept: PHYSICAL MEDICINE AND REHAB | Facility: CLINIC | Age: 74
End: 2019-08-15
Payer: COMMERCIAL

## 2019-08-15 VITALS — SYSTOLIC BLOOD PRESSURE: 119 MMHG | OXYGEN SATURATION: 97 % | HEART RATE: 96 BPM | DIASTOLIC BLOOD PRESSURE: 71 MMHG

## 2019-08-15 PROCEDURE — 99214 OFFICE O/P EST MOD 30 MIN: CPT

## 2019-08-15 NOTE — HISTORY OF PRESENT ILLNESS
[FreeTextEntry1] : 74 yo M presents for f/u for TBI s/p fall.  Last seen 7/11/19.  Was referred to Vestibular therapy and speech therapy.\par \par He started VT and has attended 3 visits.  He is scheduled to attend BIW. He states his dizziness is a little better. Tolerating well\par \par Spoke with his PT--Pt. seems to be improving.  Tolerating habituation exercises.  dizziness is not constant, but with "complex movement" such as sitting up and turning. States just sitting up or standing does not cause it. \par No visual deficits.\par \par He completed speech therapy as he has met goals.  He is continuing on HEP for mainly focusing on writing and reading. He continues to use strategies for memory.\par \par Speech therapy report--Good insight & understanding of compensatory strategies for attention, memory and written language.  Completed deductive reasoning puzzles with 100% accuracy. Met goals of speech, discont. speech. \par \par Pt. inquiring regarding return to driving and work. \par \par

## 2019-08-15 NOTE — ASSESSMENT
[FreeTextEntry1] : --Cont. Vestibular therapy BIW-- Pt. had only 3 sessions. \par \par --Not cleared to drive or return to work at this time.  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  His commute is 1 hour driving.  Concern regarding development of vestibular symtoms during driving and affecting safety prevent me from clearing him to drive at this time. \par --Will need to continue at least 4 more weeks of VT (8-9 sessions more) before reassessment of return to driving and work. \par \par --Will complete pt's Short term disability-- extending return to work date to 9/23/19 on PT basis.  \par \par All questions answered.\par

## 2019-08-15 NOTE — DATA REVIEWED
[FreeTextEntry1] : CT brain 8/12/19--abnormal low attenuation in Left posterior temporal/inferior parietal cortical subcortical region. No hemorrhage [CT Scan] : CT Scan

## 2019-08-15 NOTE — PHYSICAL EXAM
[FreeTextEntry1] : Constitutional: Alert, awake, no acute distress\par HEENT: EOMI, NC/AT, neck supple\par Cardiovascular: All extremities warm and well perfused\par Pulmonary: No respiratory distress, normal chest wall expansion\par Gastrointestinal: No abdominal distention\par \par Neuro: \par AAOx3, \par \par CN: intact no nystagmus, visual fields intact, PERRLA, EOMI, no facial weakness or sensory deficit, shoulder gerhard intact, tongue midline\par VOR testing--Slight nystagmus\par near far accommodation--tolerates without symptoms\par focusing on stationary thumb with head rotation in horizontal or vertical plane--tolerates w/o symptoms\par Motor 5/5 bilat UE and LE\par Sens intact bilat UE and LE\par Coordination intact--FNF and HTS intact\par Gait normal\par Balance: able to complete tandem gait \par \par \par Lashon Lee-- + nystagmus on right.  Neg on left

## 2019-09-17 ENCOUNTER — APPOINTMENT (OUTPATIENT)
Dept: PHYSICAL MEDICINE AND REHAB | Facility: CLINIC | Age: 74
End: 2019-09-17
Payer: COMMERCIAL

## 2019-09-17 VITALS
OXYGEN SATURATION: 95 % | WEIGHT: 147 LBS | HEART RATE: 73 BPM | BODY MASS INDEX: 23.35 KG/M2 | DIASTOLIC BLOOD PRESSURE: 77 MMHG | SYSTOLIC BLOOD PRESSURE: 131 MMHG | HEIGHT: 66.5 IN

## 2019-09-17 PROCEDURE — 99214 OFFICE O/P EST MOD 30 MIN: CPT

## 2019-09-20 NOTE — PHYSICAL EXAM
[FreeTextEntry1] : Constitutional: Alert, awake, no acute distress\par HEENT: EOMI, NC/AT, neck supple\par Cardiovascular: All extremities warm and well perfused\par Pulmonary: No respiratory distress, normal chest wall expansion\par Gastrointestinal: No abdominal distention\par \par Neuro: \par AAOx3, \par \par CN: intact no nystagmus, visual fields intact, PERRLA, EOMI, no facial weakness or sensory deficit, shoulder gerhard intact, tongue midline\par VOR testing--No  nystagmus\par near far accommodation--tolerates without symptoms\par focusing on stationary thumb with head rotation in horizontal or vertical plane--tolerates w/o symptoms\par Motor 5/5 bilat UE and LE\par Sens intact bilat UE and LE\par Coordination intact--FNF and HTS intact\par Gait normal\par Balance: able to complete tandem gait \par \par \par Lashon Lee-- Neg. \par \par MSK:\par No cervical or para cervical tenderness, \par Cervical range of motion within normal limits, No dizziness or spinning with head movement\par \par

## 2019-09-20 NOTE — ASSESSMENT
[FreeTextEntry1] : TBI--resolving.  \par Couple more PT visits left then d/c. \par \par May return to driving\par \par Cleared to return to work--part-time initially with transition to full time over 2 weeks. See letter in chart

## 2019-09-20 NOTE — HISTORY OF PRESENT ILLNESS
[FreeTextEntry1] : 72 yo M presents for f/u for TBI s/p fall. Last seen 8/15/19. \par \par Completed Vestibular therapy.  Last note 9/12/19--Vertigo with position changes, steady with all transfers and turns, Independent with gaze shifting exercises, and side stepping. Tolerating outdoor ambulation on all surfaces as well.  meeting goals of therapy\par \par States he is doing well.  No dizziness with daily activities.  Occasionally may have some mild dizziness if he does too much or moves too fast. \par \par Saw neurology-- had EEG which was negative.  \par Pt. denies pain.  \par \par Pt. is motivated to return to work.  Asking about clearance to drive.  States that when he first goes back to work will have someone drive him to work.  \par No visual issues\par \par Independent with ambulation and ADLs--no ADs

## 2019-10-07 ENCOUNTER — RX RENEWAL (OUTPATIENT)
Age: 74
End: 2019-10-07

## 2019-11-07 ENCOUNTER — APPOINTMENT (OUTPATIENT)
Dept: PHYSICAL MEDICINE AND REHAB | Facility: CLINIC | Age: 74
End: 2019-11-07
Payer: COMMERCIAL

## 2019-11-07 VITALS
WEIGHT: 150 LBS | DIASTOLIC BLOOD PRESSURE: 82 MMHG | BODY MASS INDEX: 23.82 KG/M2 | HEIGHT: 66.5 IN | SYSTOLIC BLOOD PRESSURE: 147 MMHG | OXYGEN SATURATION: 97 % | HEART RATE: 82 BPM

## 2019-11-07 DIAGNOSIS — R41.3 OTHER AMNESIA: ICD-10-CM

## 2019-11-07 PROCEDURE — 99214 OFFICE O/P EST MOD 30 MIN: CPT | Mod: GC

## 2019-11-09 NOTE — ASSESSMENT
[FreeTextEntry1] : 72 yo M with h/o TBI s/p fall\par \par Patient to follow up with neurology\par Letter given to patient to continue to work part time \par Continue with HEP to prevent deconditioning\par Return to clinic for f/u in 3-4 months

## 2019-11-09 NOTE — REVIEW OF SYSTEMS
[Negative] : Psychiatric [Chest Pain] : no chest pain [Palpitations] : no palpitations [Shortness Of Breath] : no shortness of breath [Wheezing] : no wheezing

## 2019-11-09 NOTE — PHYSICAL EXAM
[Normal] : Deep tendon reflexes were 2+ and symmetric, the sensory exam was normal to light touch and pinprick and no focal deficits [FreeTextEntry1] : AAOx3, NAD, follows commands appropriately\par PERRLA, EOMI\par no nystagmus no saccades\par finger to nose intact b/l\par Vlad Hallpike negative\par 5/5 muscle strength in b/l UE and LE\par sensation intact to light touch\par normal reciprocal gait pattern, single leg stance intact b/l\par \par Constitutional: Alert, awake, no acute distress\par HEENT: EOMI, NC/AT, neck supple\par Cardiovascular: All extremities warm and well perfused\par Pulmonary: No respiratory distress, normal chest wall expansion\par Gastrointestinal: No abdominal distention\par

## 2019-11-09 NOTE — HISTORY OF PRESENT ILLNESS
[FreeTextEntry1] : 74 yo M presents for f/u for TBI s/p fall. Last seen 9/17/19. \par \par Patient currently with no complaints. He has returned to work part time without any difficulty. He did attempt to return to work full time but was unable to tolerate. He c/o increased lethargy, exhaustion, slower processing when attempted to return full time. \par \par He has also returned to driving without any difficulty. \par \par He remains on Keppra. No recent reported seizure activity. Patient had questionable seizure at the time of injury.  No seizure events as inpatient or when left the hospital.   Patient has a follow up with neurology today-- will discuss tapering seizure medication. \par No complaints of dizziness, light headness. Patient reports good sleep, approx 8-9 hours. Patient reports good PO intake and is staying hydrated. \par \par To note, patient had follow up with optho recently and reports no changes in his vision or prescription. \par \par Independent with ambulation and ADLs--no ADs

## 2020-03-18 NOTE — ED PROVIDER NOTE - MUSCULOSKELETAL NEGATIVE STATEMENT, MLM
off work until cleared by hand specialist
no back pain, no gout, no musculoskeletal pain, no neck pain, and no weakness.

## 2020-05-21 ENCOUNTER — OUTPATIENT (OUTPATIENT)
Dept: OUTPATIENT SERVICES | Facility: HOSPITAL | Age: 75
LOS: 1 days | End: 2020-05-21
Payer: COMMERCIAL

## 2020-05-21 DIAGNOSIS — S06.5X0A TRAUMATIC SUBDURAL HEMORRHAGE WITHOUT LOSS OF CONSCIOUSNESS, INITIAL ENCOUNTER: ICD-10-CM

## 2020-05-21 DIAGNOSIS — Z00.00 ENCOUNTER FOR GENERAL ADULT MEDICAL EXAMINATION WITHOUT ABNORMAL FINDINGS: ICD-10-CM

## 2020-05-21 PROCEDURE — 95812 EEG 41-60 MINUTES: CPT

## 2020-05-26 ENCOUNTER — OUTPATIENT (OUTPATIENT)
Dept: OUTPATIENT SERVICES | Facility: HOSPITAL | Age: 75
LOS: 1 days | End: 2020-05-26
Payer: COMMERCIAL

## 2020-05-26 ENCOUNTER — APPOINTMENT (OUTPATIENT)
Dept: CT IMAGING | Facility: HOSPITAL | Age: 75
End: 2020-05-26
Payer: COMMERCIAL

## 2020-05-26 DIAGNOSIS — Z00.8 ENCOUNTER FOR OTHER GENERAL EXAMINATION: ICD-10-CM

## 2020-05-26 PROCEDURE — 70450 CT HEAD/BRAIN W/O DYE: CPT | Mod: 26

## 2020-05-26 PROCEDURE — 70450 CT HEAD/BRAIN W/O DYE: CPT

## 2020-07-08 NOTE — PATIENT PROFILE ADULT - INFORMATION PROVIDED TO:
[facility]al [facility]  [address] [place]  [address]  [phone]        DATE:  2020   NAME:  Charmaine Stout  :  1976  AGE:  43 year old  PCP:  Gibson Perry MD    Allergies:  ALLERGIES:  No Known Allergies    Family history:  Family History   Problem Relation Age of Onset   • Other Mother         single kidney stone   • Heart disease Mother    • Other Father         history unknown   • Other Sister         single kidney stone   • Heart disease Sister    • Other Sister         single kidney stone   • Heart disease Maternal Uncle    • Myocardial Infarction Maternal Uncle    • Heart disease Maternal Grandfather    • Myocardial Infarction Maternal Grandfather    • Hypertension Maternal Grandmother    • Diabetes Maternal Grandmother    • Cancer, Breast Maternal Grandmother    • Myocardial Infarction Maternal Grandmother        Past medical history:  Past Medical History:   Diagnosis Date   • Calcium nephrolithiasis     first documented stone 10/2008; ureteroscopy 10/08,    • Fibroids     of uterus   • Herpes simplex virus infection    • Microscopic hematuria    • Recurrent UTI        SUBSTANCE HISTORY:.  Social History     Substance and Sexual Activity   Drug Use No       Current medications:  Current Outpatient Medications   Medication Sig Dispense Refill   • nitrofurantoin, macrocrystal-monohydrate, (MACROBID) 100 MG capsule Take 1 capsule by mouth 2 times daily. 10 capsule 0   • methylpheniDATE (METADATE ER) 10 MG ER tablet Take 1 tablet by mouth daily with 20 mg tab for a total of 30 mg. 30 tablet 0   • methylpheniDATE (METADATE ER) 20 MG ER tablet Take 1 tablet by mouth daily. Do not start before 2020. 30 tablet 0   • methylpheniDATE (RITALIN) 10 MG tablet Take 1 to 1 and HALF tablets by mouth every evening. Do not start before 2020. 45 tablet 0   • zolpidem (AMBIEN) 10 MG tablet Take a HALF to 1 tablet by mouth at bedtime. Must last 30 days. 30 tablet 0   •  clonazePAM (KLONOPIN) 1 MG tablet Take a HALF to 1 tablet by mouth 2 times daily as needed for acute anxiety 45 tablet 0   • FLUoxetine (PROZAC) 20 MG capsule Take 1 capsule by mouth daily. 30 capsule 1   • hydrOXYzine (ATARAX) 25 MG tablet Take HALF to 1 tablet by mouth 2 times daily as needed for anxiety 60 tablet 1   • valACYclovir (VALTREX) 500 MG tablet Take 1 tablet by mouth daily. 90 tablet 3   • metroNIDAZOLE (FLAGYL) 500 MG tablet Take 1 tablet by mouth 2 times daily. 14 tablet 0   • drospirenone-ethinyl estradiol (FRANK) 3-0.02 MG per tablet Take 1 tablet by mouth daily. Due for annual exam in January 84 tablet 3   • buPROPion XL (WELLBUTRIN XL) 150 MG 24 hr tablet Take 1 tablet by mouth every morning AND 2 tablets every evening. 270 tablet 2     No current facility-administered medications for this visit.        Chief Complaint: \"Things have been very stressful lately, I need to take my ex- back to court again which is stressful for me appreciates see me on short notice  Session Time:  30 minutes and 30 minutes were spent on psychotherapy.    Diagnoses:    1. Moderate major depression (CMS/HCC)    2. ZAIRA (generalized anxiety disorder)    3. Anxiety    4. Attention-deficit hyperactivity disorder, predominantly inattentive type        History of present illness:  Patient reports insides here last she has had increased stress with regards to needing to take her  or ex- back to court has a meeting with her  next week but feels that she has had difficulty with sleep being hypervigilant increased anxiety we will initiate trial of hydroxyzine on a p.r.n. basis keep the rest medication regimen unchanged.  Much support encouragement provided patient denies any difficulty with GI distress appetite remains stable no impairment in gait facial takes tachycardia hypertension noted no reported memory does remain intact alert oriented x3    Mental status exam: Patient presents casually dressed,  appropriately groomed, speech is regular rate and rhythm. Mood and affect euthymic. Patient denies any active suicidal thoughts intent or plan, able to contract for safety. No evidence nor report of any EPS, tardive dyskinesia nor akathisia.  Patient denies and there is no evidence of any active psychosis, patient denies any side effects.    TREATMENT PLAN:  Initiate trial of hydroxyzine p.r.n. continue the rest medication regimen unchanged with current medication plan. Patient to return to clinic in  for 3 months 30 minute appointment.  Patient agrees with current plan and understands to call the clinic with any questions, comments, or concerns.    LIZZY Morales         caregiver

## 2021-02-04 ENCOUNTER — APPOINTMENT (OUTPATIENT)
Dept: INTERNAL MEDICINE | Facility: CLINIC | Age: 76
End: 2021-02-04
Payer: MEDICARE

## 2021-02-04 ENCOUNTER — NON-APPOINTMENT (OUTPATIENT)
Age: 76
End: 2021-02-04

## 2021-02-04 VITALS
RESPIRATION RATE: 16 BRPM | HEART RATE: 84 BPM | HEIGHT: 66 IN | TEMPERATURE: 97.9 F | SYSTOLIC BLOOD PRESSURE: 132 MMHG | OXYGEN SATURATION: 98 % | WEIGHT: 145 LBS | DIASTOLIC BLOOD PRESSURE: 70 MMHG | BODY MASS INDEX: 23.3 KG/M2

## 2021-02-04 DIAGNOSIS — Z82.49 FAMILY HISTORY OF ISCHEMIC HEART DISEASE AND OTHER DISEASES OF THE CIRCULATORY SYSTEM: ICD-10-CM

## 2021-02-04 PROCEDURE — G0444 DEPRESSION SCREEN ANNUAL: CPT

## 2021-02-04 PROCEDURE — 99072 ADDL SUPL MATRL&STAF TM PHE: CPT

## 2021-02-04 PROCEDURE — G0439: CPT

## 2021-02-04 PROCEDURE — 93000 ELECTROCARDIOGRAM COMPLETE: CPT | Mod: 59

## 2021-02-04 RX ORDER — CLOTRIMAZOLE AND BETAMETHASONE DIPROPIONATE 10; .5 MG/G; MG/G
1-0.05 CREAM TOPICAL
Qty: 15 | Refills: 0 | Status: DISCONTINUED | COMMUNITY
Start: 2017-06-02 | End: 2021-02-04

## 2021-02-04 RX ORDER — ATORVASTATIN CALCIUM 20 MG/1
20 TABLET, FILM COATED ORAL
Qty: 30 | Refills: 0 | Status: DISCONTINUED | COMMUNITY
Start: 2017-01-31 | End: 2021-02-04

## 2021-02-04 RX ORDER — PRIMIDONE 50 MG/1
50 TABLET ORAL
Qty: 60 | Refills: 0 | Status: DISCONTINUED | COMMUNITY
Start: 2017-12-01 | End: 2021-02-04

## 2021-02-04 RX ORDER — TAMSULOSIN HYDROCHLORIDE 0.4 MG/1
0.4 CAPSULE ORAL
Qty: 180 | Refills: 0 | Status: DISCONTINUED | COMMUNITY
Start: 2016-12-24 | End: 2021-02-04

## 2021-02-04 RX ORDER — FOLIC ACID 1 MG/1
1 TABLET ORAL
Qty: 30 | Refills: 0 | Status: DISCONTINUED | COMMUNITY
Start: 2019-07-26 | End: 2021-02-04

## 2021-02-04 RX ORDER — LEVETIRACETAM 500 MG/1
500 TABLET, FILM COATED ORAL
Qty: 60 | Refills: 0 | Status: DISCONTINUED | COMMUNITY
Start: 2019-07-26 | End: 2021-02-04

## 2021-02-04 RX ORDER — ATORVASTATIN CALCIUM 20 MG/1
20 TABLET, FILM COATED ORAL
Refills: 0 | Status: DISCONTINUED | COMMUNITY
End: 2021-02-04

## 2021-02-04 RX ORDER — FINASTERIDE 5 MG/1
5 TABLET, FILM COATED ORAL
Qty: 90 | Refills: 0 | Status: DISCONTINUED | COMMUNITY
Start: 2017-07-03 | End: 2021-02-04

## 2021-02-05 VITALS
TEMPERATURE: 97.9 F | DIASTOLIC BLOOD PRESSURE: 70 MMHG | WEIGHT: 145 LBS | BODY MASS INDEX: 23.3 KG/M2 | HEART RATE: 84 BPM | RESPIRATION RATE: 16 BRPM | HEIGHT: 66 IN | SYSTOLIC BLOOD PRESSURE: 132 MMHG

## 2021-02-05 NOTE — HISTORY OF PRESENT ILLNESS
[FreeTextEntry1] : Comes to the office as a new patient for a comprehensive physical examination to review his medications and discuss his overall health recent issues as a consequence of head trauma [de-identified] : 75-year-old man comes to the office as a new patient with a history of essential tremor benign prostatic hyperplasia status post a fall with an injury cerebral hemorrhage associated with the seizure since the accident the bleeding has resolved he has been left with a slight impairment of balance and borderline memory impairment seizures have not recurred his Keppra has been discontinued he denies temperature chills sweats or myalgias he has had no headache sinus congestion sore throat cough wheezing pleurisy chest pain shortness of breath exertional dyspnea lightheadedness palpitations dizziness vertigo or syncope he has had no abdominal pain nausea vomiting diarrhea constipation bright red blood per rectum or black stools he urinates frequently and does get up at night to urinate he has a mild unsteady walk his memory issues have been slowly improving since his head trauma denies leg edema skin rashes and usually sleeps well he has had no dysuria or gross hematuria

## 2021-02-05 NOTE — PHYSICAL EXAM
[No Acute Distress] : no acute distress [Well Nourished] : well nourished [Well Developed] : well developed [Well-Appearing] : well-appearing [Normal Sclera/Conjunctiva] : normal sclera/conjunctiva [PERRL] : pupils equal round and reactive to light [EOMI] : extraocular movements intact [Normal Outer Ear/Nose] : the outer ears and nose were normal in appearance [Normal Oropharynx] : the oropharynx was normal [Normal TMs] : both tympanic membranes were normal [Normal Nasal Mucosa] : the nasal mucosa was normal [No JVD] : no jugular venous distention [No Lymphadenopathy] : no lymphadenopathy [Supple] : supple [Thyroid Normal, No Nodules] : the thyroid was normal and there were no nodules present [No Respiratory Distress] : no respiratory distress  [No Accessory Muscle Use] : no accessory muscle use [Clear to Auscultation] : lungs were clear to auscultation bilaterally [Normal Percussion] : the chest was normal to percussion [Normal Rate] : normal rate  [Regular Rhythm] : with a regular rhythm [Normal S1, S2] : normal S1 and S2 [No Murmur] : no murmur heard [No Carotid Bruits] : no carotid bruits [No Abdominal Bruit] : a ~M bruit was not heard ~T in the abdomen [No Varicosities] : no varicosities [Pedal Pulses Present] : the pedal pulses are present [No Edema] : there was no peripheral edema [No Palpable Aorta] : no palpable aorta [No Extremity Clubbing/Cyanosis] : no extremity clubbing/cyanosis [Declined Breast Exam] : declined breast exam  [Soft] : abdomen soft [Non Tender] : non-tender [Non-distended] : non-distended [No Masses] : no abdominal mass palpated [No HSM] : no HSM [Normal Bowel Sounds] : normal bowel sounds [No Hernias] : no hernias [Declined Rectal Exam] : declined rectal exam [Normal Supraclavicular Nodes] : no supraclavicular lymphadenopathy [Normal Axillary Nodes] : no axillary lymphadenopathy [Normal Posterior Cervical Nodes] : no posterior cervical lymphadenopathy [Normal Anterior Cervical Nodes] : no anterior cervical lymphadenopathy [Normal Inguinal Nodes] : no inguinal lymphadenopathy [Normal Femoral Nodes] : no femoral lymphadenopathy [No CVA Tenderness] : no CVA  tenderness [No Spinal Tenderness] : no spinal tenderness [No Joint Swelling] : no joint swelling [Grossly Normal Strength/Tone] : grossly normal strength/tone [No Rash] : no rash [No Skin Lesions] : no skin lesions [Coordination Grossly Intact] : coordination grossly intact [No Focal Deficits] : no focal deficits [Normal Gait] : normal gait [Deep Tendon Reflexes (DTR)] : deep tendon reflexes were 2+ and symmetric [Speech Grossly Normal] : speech grossly normal [Memory Grossly Normal] : memory grossly normal [Normal Affect] : the affect was normal [Alert and Oriented x3] : oriented to person, place, and time [Normal Mood] : the mood was normal [Normal Insight/Judgement] : insight and judgment were intact [Kyphosis] : no kyphosis [Scoliosis] : no scoliosis [Acne] : no acne

## 2021-02-05 NOTE — ASSESSMENT
[FreeTextEntry1] : Physical examination shows a well-developed man in no acute distress blood pressure 132/70 height 5 foot 6 inches weight 145 pounds BMI of 23.4 temperature 97.9 °F heart rate of 84 respirations 16 HEENT was unremarkable chest was clear cardiovascular exam was regular with no extra heart sounds or murmurs abdomen was soft and nontender extremities showed no clubbing cyanosis or edema neurologic exam was nonfocal patient was given a slip for complete blood test eluding CBC full chemistries lipid profile thyroid profile urine analysis CRP hemoglobin A1c vitamins D3 and B12 and IgG antibodies for COVID-19 measles mumps and rubella PSA was also ordered total and free patient is up-to-date with his ophthalmologist was encouraged to keep regular with his dermatologist patient's last colonoscopy was approximately 10 years ago and he is aware of the need to schedule that in the near future attempt to obtain his immunization records from his previous PCP and his pharmacy he was encouraged to consider the new shingles vaccine and the COVID-19 vaccine patient is followed by a urologist and continues on his Flomax and finasteride patient also suggested a history of bladder stones EKG reports a 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

## 2021-02-05 NOTE — HEALTH RISK ASSESSMENT
[] : No [No] : No [Any fall with injury in past year] : Patient reported fall with injury in the past year [0] : 2) Feeling down, depressed, or hopeless: Not at all (0) [de-identified] : Intracerebral hemorrhage [[id number]] : 0 [HIV test declined] : HIV test declined [Hepatitis C test declined] : Hepatitis C test declined [ColonoscopyDate] : 2011

## 2021-02-24 LAB
APPEARANCE: CLEAR
BILIRUBIN URINE: NEGATIVE
BLOOD URINE: NEGATIVE
COLOR: YELLOW
GLUCOSE QUALITATIVE U: NEGATIVE
KETONES URINE: NORMAL
LEUKOCYTE ESTERASE URINE: NEGATIVE
NITRITE URINE: NEGATIVE
PH URINE: 6
PROTEIN URINE: NORMAL
SPECIFIC GRAVITY URINE: 1.03
UROBILINOGEN URINE: NORMAL

## 2021-03-10 LAB
25(OH)D3 SERPL-MCNC: 57.1 NG/ML
ALBUMIN SERPL ELPH-MCNC: 4.6 G/DL
ALP BLD-CCNC: 75 U/L
ALT SERPL-CCNC: 10 U/L
ANION GAP SERPL CALC-SCNC: 16 MMOL/L
AST SERPL-CCNC: 15 U/L
BASOPHILS # BLD AUTO: 0.04 K/UL
BASOPHILS NFR BLD AUTO: 0.8 %
BILIRUB SERPL-MCNC: 1.1 MG/DL
BUN SERPL-MCNC: 24 MG/DL
CALCIUM SERPL-MCNC: 9.8 MG/DL
CHLORIDE SERPL-SCNC: 103 MMOL/L
CHOLEST SERPL-MCNC: 246 MG/DL
CO2 SERPL-SCNC: 21 MMOL/L
CREAT SERPL-MCNC: 1.28 MG/DL
CRP SERPL HS-MCNC: 0.23 MG/L
EOSINOPHIL # BLD AUTO: 0.11 K/UL
EOSINOPHIL NFR BLD AUTO: 2.2 %
ESTIMATED AVERAGE GLUCOSE: 126 MG/DL
GLUCOSE BS SERPL-MCNC: 94 MG/DL
GLUCOSE SERPL-MCNC: 100 MG/DL
HBA1C MFR BLD HPLC: 6 %
HCT VFR BLD CALC: 43.3 %
HDLC SERPL-MCNC: 79 MG/DL
HGB BLD-MCNC: 14.2 G/DL
IMM GRANULOCYTES NFR BLD AUTO: 0.2 %
LDLC SERPL CALC-MCNC: 156 MG/DL
LYMPHOCYTES # BLD AUTO: 2.04 K/UL
LYMPHOCYTES NFR BLD AUTO: 40.4 %
MAN DIFF?: NORMAL
MCHC RBC-ENTMCNC: 28.5 PG
MCHC RBC-ENTMCNC: 32.8 GM/DL
MCV RBC AUTO: 86.9 FL
MEV IGG FLD QL IA: >300 AU/ML
MEV IGG+IGM SER-IMP: POSITIVE
MONOCYTES # BLD AUTO: 0.62 K/UL
MONOCYTES NFR BLD AUTO: 12.3 %
MUV AB SER-ACNC: POSITIVE
MUV IGG SER QL IA: 268 AU/ML
NEUTROPHILS # BLD AUTO: 2.23 K/UL
NEUTROPHILS NFR BLD AUTO: 44.1 %
NONHDLC SERPL-MCNC: 167 MG/DL
PLATELET # BLD AUTO: 127 K/UL
POTASSIUM SERPL-SCNC: 4.7 MMOL/L
PROT SERPL-MCNC: 6.9 G/DL
PSA FREE FLD-MCNC: 35 %
PSA FREE SERPL-MCNC: 0.27 NG/ML
PSA SERPL-MCNC: 0.78 NG/ML
PSA SERPL-MCNC: 0.78 NG/ML
RBC # BLD: 4.98 M/UL
RBC # FLD: 14.1 %
RUBV IGG FLD-ACNC: 32.3 INDEX
RUBV IGG SER-IMP: POSITIVE
SARS-COV-2 IGG SERPL IA-ACNC: 0.06 INDEX
SARS-COV-2 IGG SERPL QL IA: NEGATIVE
SODIUM SERPL-SCNC: 140 MMOL/L
T4 FREE SERPL-MCNC: 1.5 NG/DL
TRIGL SERPL-MCNC: 53 MG/DL
TSH SERPL-ACNC: 2.23 UIU/ML
VIT B12 SERPL-MCNC: 1537 PG/ML
WBC # FLD AUTO: 5.05 K/UL

## 2021-03-29 NOTE — PATIENT PROFILE ADULT - NSALCOHOLWITHDRAWAL_GEN_A_NUR
Reason for Call:  Other     Detailed comments: EDC:  10/16/2021    Pt has had a migraine headache for 2 days and wants to know if there is anything she can take for this.     Also states she is still having a lot of trouble with nausea and vomiting - cannot keep even Pedialyte down with the use of Zofran    PHARMACY:  [facility]    Please contact pt     Phone Number Patient can be reached at: Home number on file [phone] (home)    Best Time: Any    Can we leave a detailed message on this number? YES    Call taken on 3/29/2021 at 1:11 PM by Denise Behrendt      
S-(situation): migraine that started yesterday mid day. Nausea not controlled by Zofran.     B-(background): Patient reports that she has had a migraine for the last 24 hours. Reports that she started getting migraines in 2010 around her period but would take ibuprofen and it would manage the discomfort, however due to pregnancy she has not taken anything for them.     Also, patient has been having increased nausea and vomiting that is not managed by Zofran, small snacks or crackers. States that she really hasn't been able to eat or drink much in the last 24 hours and right now is just drive heaving.     A-(assessment): Reports feeling dizzy all the time, when she is sitting, working on the computer, and walking around. Patient reports that she was woken up because of her migraine, and has not slept well the last couple of days. Has not tried Tylenol. Reports having light sensitivity.    States that she is taking a sip of lemon water every 15 minutes and eating crackers throughout the day and takes one tablet of Zofran around 8 AM every morning. Today she has has attempted to drink Pedi-lite, and a protein shake (which she vomited up)     Denies: fever, abdominal pain, bleeding, any recent head injuries, loss of consciousness, or confusion. Denies history of blood pressure issues. Denies: any blood in her vomit or coffee grounds, no constipation/diarrhea.     R-(recommendations): Encouraged patient to continue to try an push fluids, takes breaks from screen time (which patient is at work and states is hard to do), keep something like crackers on hand and eat them throughout the day to keep something in her stomach. Advised that would send message to the provider to further advise.     Thanks,    Valentina Mcfarland RN      
none

## 2021-04-08 ENCOUNTER — APPOINTMENT (OUTPATIENT)
Dept: NEUROLOGY | Facility: CLINIC | Age: 76
End: 2021-04-08
Payer: MEDICARE

## 2021-04-08 VITALS — DIASTOLIC BLOOD PRESSURE: 80 MMHG | SYSTOLIC BLOOD PRESSURE: 150 MMHG | HEART RATE: 82 BPM

## 2021-04-08 PROCEDURE — 99072 ADDL SUPL MATRL&STAF TM PHE: CPT

## 2021-04-08 PROCEDURE — 99215 OFFICE O/P EST HI 40 MIN: CPT

## 2021-04-08 NOTE — HISTORY OF PRESENT ILLNESS
[FreeTextEntry1] : Mr. Dudley presents  for an office visit with his wife.  He is having some residual disequilibrium especially in the upright posture.  He denies true vertigo.  He complains of disuse of the right hand with micrographia and decrease in right hand fine coordination.  He does not swing his right arm when ambulating.  In the past he declined the nuclear medicine REJI scan.  Had no recurrent seizures off of Keppra.\par \par

## 2021-04-08 NOTE — PHYSICAL EXAM
[FreeTextEntry1] : Head:  Normocephalic Neck: Supple nontender no carotid bruits.  \par \par Mental Status:  Alert Oriented X3 Speech normal and no aphasia or dysarthria.\par \par Cranial Nerves: Mild facial masking positive Myerson sign PERRL, Fundi normal Visual Fields full  EOMI no diplopia no ptosis no nystagmus, V through XII intact.\par \par Motor: Increased tone with cogwheeling and decreased rapid movements right upper extremity.  There is a bilateral postural and intention hand tremor.  I did not see any resting tremor.\par \par DTRs: Symmetric and 2+.  Plantars flexor.  No Clonus.\par \par Sensory:  Normal testing with pin light touch and vibration and  Joint position sense.  .\par \par Gait: Reduced right arm swing.\par

## 2021-04-08 NOTE — REASON FOR VISIT
[Follow-Up: _____] : a [unfilled] follow-up visit [FreeTextEntry1] : Traumatic brain injury and parkinsonism
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
## 2021-04-08 NOTE — CONSULT LETTER
[Dear  ___] : Dear  [unfilled], [Consult Letter:] : I had the pleasure of evaluating your patient, [unfilled]. [Please see my note below.] : Please see my note below. [Consult Closing:] : Thank you very much for allowing me to participate in the care of this patient.  If you have any questions, please do not hesitate to contact me. [Sincerely,] : Sincerely, [FreeTextEntry3] : Jay Leiva MD\par
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
## 2021-05-27 ENCOUNTER — APPOINTMENT (OUTPATIENT)
Dept: NEUROLOGY | Facility: CLINIC | Age: 76
End: 2021-05-27
Payer: MEDICARE

## 2021-05-27 VITALS
WEIGHT: 145 LBS | HEART RATE: 84 BPM | HEIGHT: 66 IN | SYSTOLIC BLOOD PRESSURE: 142 MMHG | BODY MASS INDEX: 23.3 KG/M2 | DIASTOLIC BLOOD PRESSURE: 80 MMHG

## 2021-05-27 VITALS
HEIGHT: 66 IN | WEIGHT: 145 LBS | SYSTOLIC BLOOD PRESSURE: 142 MMHG | BODY MASS INDEX: 23.3 KG/M2 | HEART RATE: 84 BPM | DIASTOLIC BLOOD PRESSURE: 80 MMHG

## 2021-05-27 PROCEDURE — 99072 ADDL SUPL MATRL&STAF TM PHE: CPT

## 2021-05-27 PROCEDURE — 99215 OFFICE O/P EST HI 40 MIN: CPT

## 2021-05-27 NOTE — HISTORY OF PRESENT ILLNESS
[FreeTextEntry1] : Mr. Dudley is seen for an office visit.  He did not start the carbidopa levodopa as suggested.  He was concerned about the potential side effects.  He is asking about resuming physical therapy and repeating brain imaging.  Continues to have complaints of disequilibrium and right upper extremity disuse.  In the past he declined a nuclear medicine REJI scan.  He is having no seizures.\par \par

## 2021-05-27 NOTE — PHYSICAL EXAM
[FreeTextEntry1] : Head:  Normocephalic Neck: Supple nontender no carotid bruits. \par \par Mental Status:  Alert Oriented X3 Speech mild hypophonia and no aphasia or dysarthria.\par \par Cranial Nerves: Mild facial masking and a positive Myerson sign PERRL, Fundi normal Visual Fields full  EOMI no diplopia no ptosis no nystagmus, V through XII intact.\par \par Motor: Right upper extremity increased tone with cogwheeling and decreased rapid successive movements.  Mild bilateral postural upper extremity tremor.\par \par DTRs: Symmetric and 2+.  Plantars flexor.  No Clonus.\par \par Sensory:  Normal testing with pin light touch and vibration and  Joint position sense.  Normal DSS to touch.\par \par Gait: Reduced right arm swing unchanged.\par
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
## 2021-06-01 ENCOUNTER — APPOINTMENT (OUTPATIENT)
Dept: OTOLARYNGOLOGY | Facility: CLINIC | Age: 76
End: 2021-06-01
Payer: MEDICARE

## 2021-06-01 VITALS
HEART RATE: 88 BPM | TEMPERATURE: 97 F | HEIGHT: 66 IN | DIASTOLIC BLOOD PRESSURE: 75 MMHG | BODY MASS INDEX: 23.3 KG/M2 | SYSTOLIC BLOOD PRESSURE: 136 MMHG | WEIGHT: 145 LBS

## 2021-06-01 DIAGNOSIS — H90.3 SENSORINEURAL HEARING LOSS, BILATERAL: ICD-10-CM

## 2021-06-01 PROCEDURE — 99072 ADDL SUPL MATRL&STAF TM PHE: CPT

## 2021-06-01 PROCEDURE — 99204 OFFICE O/P NEW MOD 45 MIN: CPT | Mod: 25

## 2021-06-01 PROCEDURE — G0268 REMOVAL OF IMPACTED WAX MD: CPT

## 2021-06-01 NOTE — HISTORY OF PRESENT ILLNESS
[de-identified] : c/o problems with hearing loss.  C/o bilateral tinnitus and needs TV louder.  Occ problems understanding speech.  Problem for past 4 years - gradually increasing.  Had ? problem hearing at ha dispenser office about 4 years ago.  No treatment done. \par Patient  also notes occ gustatory rhinitis.  Otherwise no nasal problems.

## 2021-06-01 NOTE — REVIEW OF SYSTEMS
[Hearing Loss] : hearing loss [Dizziness] : dizziness [Vertigo] : vertigo [Problem Snoring] : problem snoring [Snoring With Pauses] : snoring with pauses [Throat Clearing] : throat clearing [Negative] : Heme/Lymph [FreeTextEntry1] : rash

## 2021-06-01 NOTE — PHYSICAL EXAM
[Midline] : trachea located in midline position [Normal] : no rashes [de-identified] : right xs cerumen; left normal  [de-identified] : after cerumen removal

## 2021-06-01 NOTE — ASSESSMENT
[FreeTextEntry1] : Patient with c/o hearing issues for past several years.  Some cerumen removed from right ear and exam otherwise unremarkable.  No evidence  of sinus issues - audio does show bilat snhl . \par Recommended HAE  - also discussed trial of flonase  - if problems persist would do nasal endoscopy and possibly try atrovent spray.  Follow up in several mos and as necessary

## 2021-06-02 NOTE — REVIEW OF SYSTEMS
[Nocturia] : nocturia [Fever] : no fever [Chills] : no chills [Fatigue] : no fatigue [Hot Flashes] : no hot flashes [Night Sweats] : no night sweats [Recent Change In Weight] : ~T no recent weight change [Discharge] : no discharge [Pain] : no pain [Redness] : no redness [Dryness] : no dryness [Vision Problems] : no vision problems [Itching] : no itching [Earache] : no earache No [Nosebleeds] : no nosebleeds [Hearing Loss] : no hearing loss [Postnasal Drip] : no postnasal drip [Nasal Discharge] : no nasal discharge [Sore Throat] : no sore throat [Hoarseness] : no hoarseness [Chest Pain] : no chest pain [Palpitations] : no palpitations [Claudication] : no  leg claudication [Lower Ext Edema] : no lower extremity edema [Orthopena] : no orthopnea [Paroxysmal Nocturnal Dyspnea] : no paroxysmal nocturnal dyspnea [Shortness Of Breath] : no shortness of breath [Wheezing] : no wheezing [Cough] : no cough [Dyspnea on Exertion] : not dyspnea on exertion [Abdominal Pain] : no abdominal pain [Nausea] : no nausea [Constipation] : no constipation [Diarrhea] : no diarrhea [Vomiting] : no vomiting [Heartburn] : no heartburn [Melena] : no melena [Dysuria] : no dysuria [Incontinence] : no incontinence [Hesitancy] : no hesitancy [Hematuria] : no hematuria [Frequency] : frequency [Impotence] : no impotency [Poor Libido] : libido not poor [Joint Pain] : no joint pain [Joint Stiffness] : no joint stiffness [Muscle Pain] : no muscle pain [Muscle Weakness] : no muscle weakness [Back Pain] : no back pain [Joint Swelling] : no joint swelling [Mole Changes] : no mole changes [Nail Changes] : no nail changes [Hair Changes] : no hair changes [Skin Rash] : no skin rash [Headache] : no headache [Dizziness] : no dizziness [Fainting] : no fainting [Confusion] : no confusion [Unsteady Walk] : ataxia [Memory Loss] : no memory loss [Suicidal] : not suicidal [Insomnia] : no insomnia [Anxiety] : no anxiety [Depression] : no depression [Easy Bleeding] : no easy bleeding [Easy Bruising] : no easy bruising [Swollen Glands] : no swollen glands [de-identified] : mild

## 2021-06-04 ENCOUNTER — OUTPATIENT (OUTPATIENT)
Dept: OUTPATIENT SERVICES | Facility: HOSPITAL | Age: 76
LOS: 1 days | End: 2021-06-04
Payer: MEDICARE

## 2021-06-04 ENCOUNTER — APPOINTMENT (OUTPATIENT)
Dept: MRI IMAGING | Facility: HOSPITAL | Age: 76
End: 2021-06-04
Payer: MEDICARE

## 2021-06-04 DIAGNOSIS — G20 PARKINSON'S DISEASE: ICD-10-CM

## 2021-06-04 DIAGNOSIS — S06.9X9A UNSPECIFIED INTRACRANIAL INJURY WITH LOSS OF CONSCIOUSNESS OF UNSPECIFIED DURATION, INITIAL ENCOUNTER: ICD-10-CM

## 2021-06-04 PROCEDURE — 70551 MRI BRAIN STEM W/O DYE: CPT

## 2021-06-04 PROCEDURE — 70551 MRI BRAIN STEM W/O DYE: CPT | Mod: 26

## 2021-06-07 ENCOUNTER — NON-APPOINTMENT (OUTPATIENT)
Age: 76
End: 2021-06-07

## 2021-07-27 ENCOUNTER — APPOINTMENT (OUTPATIENT)
Dept: INTERNAL MEDICINE | Facility: CLINIC | Age: 76
End: 2021-07-27
Payer: MEDICARE

## 2021-07-27 VITALS
SYSTOLIC BLOOD PRESSURE: 100 MMHG | OXYGEN SATURATION: 98 % | DIASTOLIC BLOOD PRESSURE: 60 MMHG | RESPIRATION RATE: 16 BRPM | BODY MASS INDEX: 24.16 KG/M2 | TEMPERATURE: 98.3 F | HEART RATE: 91 BPM | WEIGHT: 145 LBS | HEIGHT: 65 IN

## 2021-07-27 PROCEDURE — 99215 OFFICE O/P EST HI 40 MIN: CPT

## 2021-07-27 RX ORDER — FINASTERIDE 5 MG/1
5 TABLET, FILM COATED ORAL DAILY
Qty: 90 | Refills: 3 | Status: ACTIVE | COMMUNITY

## 2021-07-30 NOTE — ASSESSMENT
[FreeTextEntry1] : Physical examination reveals a well-developed man in no acute distress blood pressure 100/60 height 5 feet 5 inches weight 145 pounds BMI 24.13 temperature 98.3 °F heart rate of 91 respiration 16 oxygen saturation on room air 98% HEENT was unremarkable chest was clear cardiovascular exam was regular with no extra heart sounds or murmurs abdomen was soft and nontender extremities showed no clubbing cyanosis or edema and neurologic exam was nonfocal patient is followed regularly by Dr. Jay Leiva neurology since his neurologic injury recent MRI on June 4, 2021 showed no significant changes patient had complete blood test performed in February 2021 he is up-to-date with his ophthalmologist will consider a dermatology visit for cancer screening patient has noticed his tremor has been relatively quiet phonation at night has been significantly improved on finasteride and Flomax patient will inform me of the dates and type of COVID-19 vaccine he has obtained

## 2021-07-30 NOTE — HISTORY OF PRESENT ILLNESS
[Spouse] : spouse [FreeTextEntry1] : 75-year-old man comes to the office.  For follow-up to review his medications and discuss his overall health recent issues with recovery after his traumatic brain injury [de-identified] : Comes to the office for follow-up with a history of Parkinson's disease traumatic brain injury producing an intracerebral hemorrhage essential tremor of vestibular disorder benign prostatic hyperplasia denies temperature chills sweats or myalgias he has had no headaches sinus congestion sore throat cough wheezing pleurisy chest pain shortness of breath exertional dyspnea lightheadedness palpitations dizziness vertigo or syncope he does have a mild unsteady gait he does get up at night to urinate and urinates frequently but denies dysuria or gross hematuria he has had no leg edema skin rashes and has been sleeping adequately recently

## 2021-07-30 NOTE — REVIEW OF SYSTEMS
[Nocturia] : nocturia [Frequency] : frequency [Unsteady Walk] : ataxia [Fever] : no fever [Chills] : no chills [Fatigue] : no fatigue [Hot Flashes] : no hot flashes [Night Sweats] : no night sweats [Recent Change In Weight] : ~T no recent weight change [Discharge] : no discharge [Pain] : no pain [Redness] : no redness [Dryness] : no dryness [Vision Problems] : no vision problems [Itching] : no itching [Earache] : no earache [Hearing Loss] : no hearing loss [Nosebleeds] : no nosebleeds [Postnasal Drip] : no postnasal drip [Nasal Discharge] : no nasal discharge [Sore Throat] : no sore throat [Hoarseness] : no hoarseness [Chest Pain] : no chest pain [Palpitations] : no palpitations [Claudication] : no  leg claudication [Lower Ext Edema] : no lower extremity edema [Orthopena] : no orthopnea [Paroxysmal Nocturnal Dyspnea] : no paroxysmal nocturnal dyspnea [Shortness Of Breath] : no shortness of breath [Wheezing] : no wheezing [Cough] : no cough [Dyspnea on Exertion] : not dyspnea on exertion [Abdominal Pain] : no abdominal pain [Nausea] : no nausea [Constipation] : no constipation [Diarrhea] : no diarrhea [Vomiting] : no vomiting [Heartburn] : no heartburn [Melena] : no melena [Dysuria] : no dysuria [Incontinence] : no incontinence [Hesitancy] : no hesitancy [Hematuria] : no hematuria [Impotence] : no impotency [Poor Libido] : libido not poor [Joint Pain] : no joint pain [Joint Stiffness] : no joint stiffness [Muscle Pain] : no muscle pain [Muscle Weakness] : no muscle weakness [Back Pain] : no back pain [Joint Swelling] : no joint swelling [Mole Changes] : no mole changes [Nail Changes] : no nail changes [Hair Changes] : no hair changes [Skin Rash] : no skin rash [Headache] : no headache [Dizziness] : no dizziness [Fainting] : no fainting [Confusion] : no confusion [Memory Loss] : no memory loss [Suicidal] : not suicidal [Insomnia] : no insomnia [Anxiety] : no anxiety [Depression] : no depression [Easy Bleeding] : no easy bleeding [Easy Bruising] : no easy bruising [Swollen Glands] : no swollen glands [de-identified] : mild

## 2021-07-30 NOTE — PHYSICAL EXAM
[No Acute Distress] : no acute distress [Well Nourished] : well nourished [Well Developed] : well developed [Well-Appearing] : well-appearing [Normal Sclera/Conjunctiva] : normal sclera/conjunctiva [PERRL] : pupils equal round and reactive to light [EOMI] : extraocular movements intact [Normal Outer Ear/Nose] : the outer ears and nose were normal in appearance [Normal Oropharynx] : the oropharynx was normal [Normal TMs] : both tympanic membranes were normal [Normal Nasal Mucosa] : the nasal mucosa was normal [No JVD] : no jugular venous distention [No Lymphadenopathy] : no lymphadenopathy [Supple] : supple [Thyroid Normal, No Nodules] : the thyroid was normal and there were no nodules present [No Respiratory Distress] : no respiratory distress  [No Accessory Muscle Use] : no accessory muscle use [Clear to Auscultation] : lungs were clear to auscultation bilaterally [Normal Percussion] : the chest was normal to percussion [Normal Rate] : normal rate  [Regular Rhythm] : with a regular rhythm [Normal S1, S2] : normal S1 and S2 [No Murmur] : no murmur heard [No Carotid Bruits] : no carotid bruits [No Abdominal Bruit] : a ~M bruit was not heard ~T in the abdomen [No Varicosities] : no varicosities [Pedal Pulses Present] : the pedal pulses are present [No Edema] : there was no peripheral edema [No Palpable Aorta] : no palpable aorta [No Extremity Clubbing/Cyanosis] : no extremity clubbing/cyanosis [Declined Breast Exam] : declined breast exam  [Soft] : abdomen soft [Non Tender] : non-tender [Non-distended] : non-distended [No Masses] : no abdominal mass palpated [No HSM] : no HSM [Normal Bowel Sounds] : normal bowel sounds [No Hernias] : no hernias [Declined Rectal Exam] : declined rectal exam [Normal Supraclavicular Nodes] : no supraclavicular lymphadenopathy [Normal Axillary Nodes] : no axillary lymphadenopathy [Normal Posterior Cervical Nodes] : no posterior cervical lymphadenopathy [Normal Anterior Cervical Nodes] : no anterior cervical lymphadenopathy [Normal Inguinal Nodes] : no inguinal lymphadenopathy [Normal Femoral Nodes] : no femoral lymphadenopathy [No CVA Tenderness] : no CVA  tenderness [No Spinal Tenderness] : no spinal tenderness [Kyphosis] : no kyphosis [Scoliosis] : no scoliosis [No Joint Swelling] : no joint swelling [Grossly Normal Strength/Tone] : grossly normal strength/tone [No Rash] : no rash [No Skin Lesions] : no skin lesions [Acne] : no acne [Coordination Grossly Intact] : coordination grossly intact [No Focal Deficits] : no focal deficits [Normal Gait] : normal gait [Deep Tendon Reflexes (DTR)] : deep tendon reflexes were 2+ and symmetric [Speech Grossly Normal] : speech grossly normal [Memory Grossly Normal] : memory grossly normal [Normal Affect] : the affect was normal [Alert and Oriented x3] : oriented to person, place, and time [Normal Mood] : the mood was normal [Normal Insight/Judgement] : insight and judgment were intact

## 2021-08-05 ENCOUNTER — APPOINTMENT (OUTPATIENT)
Dept: NEUROLOGY | Facility: CLINIC | Age: 76
End: 2021-08-05
Payer: MEDICARE

## 2021-08-05 VITALS
SYSTOLIC BLOOD PRESSURE: 104 MMHG | HEART RATE: 86 BPM | WEIGHT: 145 LBS | HEIGHT: 65 IN | BODY MASS INDEX: 24.16 KG/M2 | DIASTOLIC BLOOD PRESSURE: 68 MMHG

## 2021-08-05 PROCEDURE — 99214 OFFICE O/P EST MOD 30 MIN: CPT
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
## 2021-08-05 NOTE — PHYSICAL EXAM
[FreeTextEntry1] : Head:  Normocephalic Neck: Supple nontender no carotid bruits. \par \par Mental Status:  Alert Oriented X3 Speech normal and no aphasia or dysarthria.\par \par Cranial Nerves: Mild facial masking and there is a positive Myerson sign.  PERRL, Fundi normal Visual Fields full  EOMI no diplopia no ptosis no nystagmus, V through XII intact.\par \par Motor: There is improvement in the right upper extremity hypertonia and the cogwheeling is less prominent.  Rapid successive movements improved right upper extremity.  Tremor diminished.\par \par DTRs: Symmetric and 2+.  Plantars flexor.  No Clonus.\par \par Sensory:  Normal testing with pin light touch and vibration and  Joint position sense.  Normal DSS to touch.\par \par Gait: I think there is improvement though he still has a decrease in the right arm swing.\par

## 2021-08-05 NOTE — HISTORY OF PRESENT ILLNESS
[FreeTextEntry1] : This patient is seen for an office visit.  He has been taking carbidopa levodopa 25/100 mg three times daily for approximately the last 2 months.  There is improvement in his condition.  Specifically his dizziness has resolved the right upper extremity tremor has basically resolved.  He denies side effects from the medication.  His wife agrees with the improvement.  He is not having headache he said no seizures.\par \par MRI brain noncontrast 6/4/2021 reveals left posterior temporal lobe areas of encephalomalacia and gliosis post, trauma.\par \par

## 2021-08-05 NOTE — CONSULT LETTER
[Dear  ___] : Dear  [unfilled], [Please see my note below.] : Please see my note below. [Consult Closing:] : Thank you very much for allowing me to participate in the care of this patient.  If you have any questions, please do not hesitate to contact me. [Sincerely,] : Sincerely, [FreeTextEntry3] : Jay Leiva MD\par

## 2021-11-11 ENCOUNTER — APPOINTMENT (OUTPATIENT)
Dept: NEUROLOGY | Facility: CLINIC | Age: 76
End: 2021-11-11
Payer: MEDICARE

## 2021-11-11 VITALS
HEIGHT: 65 IN | SYSTOLIC BLOOD PRESSURE: 106 MMHG | DIASTOLIC BLOOD PRESSURE: 74 MMHG | WEIGHT: 150 LBS | HEART RATE: 88 BPM | BODY MASS INDEX: 24.99 KG/M2

## 2021-11-11 VITALS
SYSTOLIC BLOOD PRESSURE: 106 MMHG | DIASTOLIC BLOOD PRESSURE: 74 MMHG | WEIGHT: 150 LBS | HEART RATE: 88 BPM | BODY MASS INDEX: 24.99 KG/M2 | HEIGHT: 65 IN

## 2021-11-11 PROCEDURE — 99214 OFFICE O/P EST MOD 30 MIN: CPT

## 2021-11-11 NOTE — ASSESSMENT
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  He has findings consistent with parkinsonism predominating in the right upper extremity and there is improvement with carbidopa levodopa.\par \par Recommendations: Continue carbidopa levodopa 25/100 mg 3 times daily.  Was suggested he take the medication on an empty stomach if able.  Office follow-up in 6 months or else as needed.\par

## 2021-11-11 NOTE — PHYSICAL EXAM
[FreeTextEntry1] : Head:  Normocephalic Neck: Supple nontender no carotid bruits.  \par \par Mental Status:  Alert Oriented X3 Speech normal and no aphasia or dysarthria.\par \par Cranial Nerves: Mild facial masking and positive Myerson sign.  PERRL, Visual Fields full  EOMI no diplopia no ptosis no nystagmus, V through XII intact.\par \par Motor: Mild right upper extremity cogwheeling and decreased rapid movements.  Decreased tremor.\par \par DTRs: Symmetric and 2+.  Plantars flexor.  No Clonus.\par \par Sensory:  Normal testing with pin light touch and vibration and  Joint position sense.  Normal DSS to touch.\par \par Gait: Mild decreased right arm swing.\par

## 2021-11-11 NOTE — HISTORY OF PRESENT ILLNESS
[FreeTextEntry1] : Mr. Dudley is seen for an office visit with his wife.  He remains improved while taking carbidopa levodopa 25/100 mg 3 times daily.  His dizziness has significantly diminished.  He has a mild unsteadiness when taking a shower with his eyes closed.  The right upper extremity has significantly decreased.  He is generally doing better overall.

## 2022-02-21 ENCOUNTER — TRANSCRIPTION ENCOUNTER (OUTPATIENT)
Age: 77
End: 2022-02-21

## 2022-03-31 NOTE — ED PROVIDER NOTE - NEUROLOGICAL GAIT WEIGHT BEARING
normal Monitor for s/s of aspiration or penetration (coughing, choking, wet/gurgly vocal qualiy). d/c PO intake if any signs are observed and contact speech department [phone]./yes

## 2022-04-29 ENCOUNTER — NON-APPOINTMENT (OUTPATIENT)
Age: 77
End: 2022-04-29

## 2022-04-29 ENCOUNTER — APPOINTMENT (OUTPATIENT)
Dept: INTERNAL MEDICINE | Facility: CLINIC | Age: 77
End: 2022-04-29
Payer: MEDICARE

## 2022-04-29 VITALS
HEART RATE: 78 BPM | BODY MASS INDEX: 24.49 KG/M2 | SYSTOLIC BLOOD PRESSURE: 124 MMHG | WEIGHT: 147 LBS | DIASTOLIC BLOOD PRESSURE: 66 MMHG | OXYGEN SATURATION: 98 % | HEIGHT: 65 IN | TEMPERATURE: 98 F | RESPIRATION RATE: 16 BRPM

## 2022-04-29 DIAGNOSIS — R56.9 UNSPECIFIED CONVULSIONS: ICD-10-CM

## 2022-04-29 DIAGNOSIS — J31.0 CHRONIC RHINITIS: ICD-10-CM

## 2022-04-29 DIAGNOSIS — G25.0 ESSENTIAL TREMOR: ICD-10-CM

## 2022-04-29 DIAGNOSIS — R00.2 PALPITATIONS: ICD-10-CM

## 2022-04-29 PROCEDURE — G0439: CPT

## 2022-04-29 PROCEDURE — 93000 ELECTROCARDIOGRAM COMPLETE: CPT | Mod: 59

## 2022-05-03 ENCOUNTER — LABORATORY RESULT (OUTPATIENT)
Age: 77
End: 2022-05-03

## 2022-05-03 DIAGNOSIS — Z23 ENCOUNTER FOR IMMUNIZATION: ICD-10-CM

## 2022-05-04 NOTE — HISTORY OF PRESENT ILLNESS
[FreeTextEntry1] : 76-year-old man comes to the office for a comprehensive physical examination to review his medications and discuss his overall health recent issues with urinary frequency and occasional back pain [de-identified] : Comes to the office for a comprehensive physical examination with a history of Parkinson's disease seizures impairment of balance tremor gustatory rhinitis previous enters cerebral hemorrhage enlarged prostate patient denies temperature chills sweats or myalgias has had no headache sinus congestion sore throat cough wheezing pleurisy chest pain shortness of breath exertional dyspnea lightheadedness palpitations dizziness vertigo or syncope has had no seizures recently denies abdominal pain nausea vomiting diarrhea constipation bright red blood per rectum or black stools appetite has been good weight has been stable does urinate frequently and gets up at night to urinate but denies dysuria or gross hematuria has occasional back pain denies leg edema skin rashes denies bouts of anxiety or depression and has been sleeping adequately recently

## 2022-05-04 NOTE — ASSESSMENT
[FreeTextEntry1] : Physical examination shows a well-developed man in no acute distress blood pressure 124/66 height 5 feet 5 inches weight 147 pounds BMI 24.46 temperature 98 °F heart rate of 78 respirations 16 oxygen saturation on room air was 98% HEENT was unremarkable chest was clear cardiovascular exam was regular with no extra heart sounds or murmurs abdomen was soft and nontender extremities showed no clubbing cyanosis or edema neurologic exam was nonfocal patient had a Cologuard test in August 2021 that will be good for 3 years and it was negative patient's EKG showed a normal sinus rhythm at 70 there was a right bundle branch block which is chronic a slip was given for complete blood test including CBC full chemistries lipid profile thyroid profile urine analysis CRP vitamins D3 and B12 COVID-19 nuclear capsid and spike domain antibodies hemoglobin A1c and PSA patient's Parkinson's disease is followed regularly by Dr. Leiva who also manages his seizure disorder and essential tremor patient's urination at night has been improved significantly on finasteride and Flomax patient to consider using an antihistamine to try to control his gustatory rhinitis

## 2022-05-04 NOTE — HEALTH RISK ASSESSMENT
[Never] : Never [No] : No [No falls in past year] : Patient reported no falls in the past year [0] : 2) Feeling down, depressed, or hopeless: Not at all (0) [PHQ-2 Negative - No further assessment needed] : PHQ-2 Negative - No further assessment needed [[id number]] : 0 [HIV test declined] : HIV test declined [Hepatitis C test declined] : Hepatitis C test declined [ColonoscopyDate] : miguel neg 08/2021

## 2022-05-04 NOTE — REVIEW OF SYSTEMS
[Nocturia] : nocturia [Frequency] : frequency [Back Pain] : back pain [Unsteady Walk] : ataxia [Fever] : no fever [Chills] : no chills [Fatigue] : no fatigue [Hot Flashes] : no hot flashes [Night Sweats] : no night sweats [Recent Change In Weight] : ~T no recent weight change [Discharge] : no discharge [Pain] : no pain [Redness] : no redness [Dryness] : no dryness [Vision Problems] : no vision problems [Itching] : no itching [Earache] : no earache [Hearing Loss] : no hearing loss [Nosebleeds] : no nosebleeds [Postnasal Drip] : no postnasal drip [Nasal Discharge] : no nasal discharge [Sore Throat] : no sore throat [Hoarseness] : no hoarseness [Chest Pain] : no chest pain [Palpitations] : no palpitations [Claudication] : no  leg claudication [Lower Ext Edema] : no lower extremity edema [Orthopena] : no orthopnea [Paroxysmal Nocturnal Dyspnea] : no paroxysmal nocturnal dyspnea [Shortness Of Breath] : no shortness of breath [Wheezing] : no wheezing [Cough] : no cough [Dyspnea on Exertion] : not dyspnea on exertion [Abdominal Pain] : no abdominal pain [Nausea] : no nausea [Constipation] : no constipation [Diarrhea] : no diarrhea [Vomiting] : no vomiting [Heartburn] : no heartburn [Melena] : no melena [Dysuria] : no dysuria [Incontinence] : no incontinence [Hesitancy] : no hesitancy [Hematuria] : no hematuria [Impotence] : no impotency [Poor Libido] : libido not poor [Joint Pain] : no joint pain [Joint Stiffness] : no joint stiffness [Muscle Pain] : no muscle pain [Muscle Weakness] : no muscle weakness [Joint Swelling] : no joint swelling [Mole Changes] : no mole changes [Nail Changes] : no nail changes [Hair Changes] : no hair changes [Skin Rash] : no skin rash [Headache] : no headache [Dizziness] : no dizziness [Fainting] : no fainting [Confusion] : no confusion [Memory Loss] : no memory loss [Suicidal] : not suicidal [Insomnia] : no insomnia [Anxiety] : no anxiety [Depression] : no depression [Easy Bleeding] : no easy bleeding [Easy Bruising] : no easy bruising [Swollen Glands] : no swollen glands [de-identified] : mild

## 2022-05-06 ENCOUNTER — APPOINTMENT (OUTPATIENT)
Dept: CARDIOLOGY | Facility: CLINIC | Age: 77
End: 2022-05-06
Payer: MEDICARE

## 2022-05-06 ENCOUNTER — NON-APPOINTMENT (OUTPATIENT)
Age: 77
End: 2022-05-06

## 2022-05-06 VITALS
BODY MASS INDEX: 24.32 KG/M2 | DIASTOLIC BLOOD PRESSURE: 76 MMHG | HEIGHT: 65 IN | SYSTOLIC BLOOD PRESSURE: 146 MMHG | OXYGEN SATURATION: 97 % | WEIGHT: 146 LBS | RESPIRATION RATE: 16 BRPM | HEART RATE: 77 BPM | TEMPERATURE: 97.7 F

## 2022-05-06 PROCEDURE — 99204 OFFICE O/P NEW MOD 45 MIN: CPT

## 2022-05-06 PROCEDURE — 93000 ELECTROCARDIOGRAM COMPLETE: CPT

## 2022-05-09 NOTE — DISCUSSION/SUMMARY
[FreeTextEntry1] : Prior CAT scans exams 2017 and ultrasound June 2019 describe a normal  aorta.  An  echo in 2019  also describes a normal ascending aorta. He is anxious to make a trip to [place]. We discussed the association of conduction system with coronary heart disease. I have asked Berry to undergo detailed cardiac testing in order to evaluate his overall cardiovascular risk.An assessment of both structural and functional heart disease was recommended to the patient.In this regard, an echocardiogram and a stress test and Holter monitor were advised to the patient. I await the upcoming noninvasive cardiac testing in order to assess his overall cardiovascular risk. We discussed the pros and cons of plain treadmill stress testing nuclear stress testing and angiography including a sensitivity analysis.We discussed current ACC guidelines and the calculated 10 year risk is approximately   25% which is severely elevated\par \par This represents a moderate amount of medical decision making based upon two or more more chronic illnesses  and the recommendation for a high risk medication  a  statin which require ongoing monitoring and evaluation for tolerance efficacy and compliance and the recommendation for a physiological test under stress, a stress test and echocardiogram\par \par \par More than half of the face to face encounter of    was spent in counseling the patient with respect to  \par \par Quality measures \par Tobacco intervention not indicated\par Statin for prevention of cardiovascular disease indicated\par Hypertension compensated\par Aspirin for ischemic vascular disease not indicated\par Tobacco screening cessation and intervention not indicated\par \par Medical necessity\par This is a high encounter based upon two or more chronic illnesses with mild exacerbation requiring further management and evaluation.   .\par \par EKG is indicated for evaluation of abnormal EKG\par \par risks benefits alternatives were discussed with the patient.\par all questions were answered to their satisfaction.

## 2022-05-09 NOTE — CARDIOLOGY SUMMARY
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

## 2022-05-09 NOTE — REASON FOR VISIT
[CV Risk Factors and Non-Cardiac Disease] : CV risk factors and non-cardiac disease [Arrhythmia/ECG Abnorrmalities] : arrhythmia/ECG abnormalities [Structural Heart and Valve Disease] : structural heart and valve disease [Coronary Artery Disease] : coronary artery disease [Spouse] : spouse [FreeTextEntry1] : Berry was recently seen by Dr. Barakat  and was told of a right bundle branch block a leaky valve and a history of thoracic aneurysm.  He comes today for clarification of  his overall cardiovascular risk. He was advised to undergo a complete cardiac evaluation. He denies chest pains shortness of breath or loss of consciousnes.\par \par

## 2022-05-10 ENCOUNTER — APPOINTMENT (OUTPATIENT)
Dept: CARDIOLOGY | Facility: CLINIC | Age: 77
End: 2022-05-10
Payer: MEDICARE

## 2022-05-10 PROCEDURE — A9500: CPT

## 2022-05-10 PROCEDURE — 93015 CV STRESS TEST SUPVJ I&R: CPT

## 2022-05-10 PROCEDURE — 78452 HT MUSCLE IMAGE SPECT MULT: CPT

## 2022-05-11 ENCOUNTER — NON-APPOINTMENT (OUTPATIENT)
Age: 77
End: 2022-05-11

## 2022-05-11 ENCOUNTER — APPOINTMENT (OUTPATIENT)
Dept: CARDIOLOGY | Facility: CLINIC | Age: 77
End: 2022-05-11
Payer: MEDICARE

## 2022-05-11 DIAGNOSIS — I51.7 CARDIOMEGALY: ICD-10-CM

## 2022-05-11 PROCEDURE — 93306 TTE W/DOPPLER COMPLETE: CPT

## 2022-05-11 PROCEDURE — 93224 XTRNL ECG REC UP TO 48 HRS: CPT

## 2022-05-12 ENCOUNTER — APPOINTMENT (OUTPATIENT)
Dept: NEUROLOGY | Facility: CLINIC | Age: 77
End: 2022-05-12
Payer: MEDICARE

## 2022-05-12 ENCOUNTER — NON-APPOINTMENT (OUTPATIENT)
Age: 77
End: 2022-05-12

## 2022-05-12 VITALS
HEIGHT: 65 IN | HEART RATE: 80 BPM | BODY MASS INDEX: 24.32 KG/M2 | SYSTOLIC BLOOD PRESSURE: 146 MMHG | DIASTOLIC BLOOD PRESSURE: 84 MMHG | WEIGHT: 146 LBS

## 2022-05-12 VITALS
BODY MASS INDEX: 24.32 KG/M2 | HEIGHT: 65 IN | HEART RATE: 80 BPM | DIASTOLIC BLOOD PRESSURE: 84 MMHG | SYSTOLIC BLOOD PRESSURE: 146 MMHG | WEIGHT: 146 LBS

## 2022-05-12 DIAGNOSIS — I71.2 THORACIC AORTIC ANEURYSM, W/OUT RUPTURE: ICD-10-CM

## 2022-05-12 LAB — DEPRECATED D DIMER PPP IA-ACNC: <150 NG/ML DDU

## 2022-05-12 PROCEDURE — 99214 OFFICE O/P EST MOD 30 MIN: CPT

## 2022-05-12 NOTE — REASON FOR VISIT
Health Maintenance Due   Topic Date Due   • Medicare Wellness Visit  08/19/2020   • COVID-19 Vaccine (2 - Pfizer 2-dose series) 04/19/2021       Patient is due for topics as listed above but is not proceeding with Immunization(s) COVID-19 and MWV (Medicare Wellness Visit) at this time.    [Follow-Up: _____] : a [unfilled] follow-up visit [FreeTextEntry1] : Traumatic brain injury and parkinsonism

## 2022-05-12 NOTE — HISTORY OF PRESENT ILLNESS
[FreeTextEntry1] : Mr. Dudley is seen for an office visit with his wife.  His condition is showing further improvement regarding right hand motor function while taking carbidopa levodopa 25/100 mg 3 times daily.  Significant reduction in dizziness is ongoing and his gait is improved.  He has had no seizures.  He has had occasional glass of wine especially on a vacation to [place].

## 2022-05-12 NOTE — PHYSICAL EXAM
[FreeTextEntry1] : Head:  Normocephalic Neck: Supple nontender no carotid bruits. \par \par Mental Status:  Alert Oriented X3 Speech normal and no aphasia or dysarthria.\par \par Cranial Nerves: Less notable facial masking.  PERRL, Visual Fields full  EOMI no diplopia no ptosis no nystagmus, V through XII intact.\par \par Motor: Decreased right upper extremity cogwheeling.  Improved rapid alternating movements.  No tremor on today's exam.. \par \par DTRs: Symmetric and 2+.  Plantars flexor.  No Clonus.\par \par Sensory:  Normal testing with pin light touch and vibration and  Joint position sense.  Normal DSS to touch.\par \par Gait: Improved right arm swing and gait.\par

## 2022-05-12 NOTE — ASSESSMENT
[FreeTextEntry1] : Impression: This 76-year-old male patient status post traumatic brain injury 5/28/2019 with residual left temporal lobe encephalomalacia on MRI has parkinsonism predominating right upper extremity.  There is improvement with carbidopa levodopa.\par \par Recommendations: Continue carbidopa levodopa 25/100 mg 3 times daily.  I did discuss with the patient and his wife that I favored abstinence from alcohol.  Office follow-up in 6 months or else as needed.\par

## 2022-05-16 ENCOUNTER — OUTPATIENT (OUTPATIENT)
Dept: OUTPATIENT SERVICES | Facility: HOSPITAL | Age: 77
LOS: 1 days | End: 2022-05-16
Payer: MEDICARE

## 2022-05-16 ENCOUNTER — APPOINTMENT (OUTPATIENT)
Dept: CT IMAGING | Facility: HOSPITAL | Age: 77
End: 2022-05-16
Payer: MEDICARE

## 2022-05-16 DIAGNOSIS — I71.2 THORACIC AORTIC ANEURYSM, WITHOUT RUPTURE: ICD-10-CM

## 2022-05-16 PROCEDURE — 71275 CT ANGIOGRAPHY CHEST: CPT

## 2022-05-16 PROCEDURE — 71275 CT ANGIOGRAPHY CHEST: CPT | Mod: 26

## 2022-05-17 ENCOUNTER — NON-APPOINTMENT (OUTPATIENT)
Age: 77
End: 2022-05-17

## 2022-07-21 LAB
25(OH)D3 SERPL-MCNC: 40.8 NG/ML
ALBUMIN SERPL ELPH-MCNC: 4.4 G/DL
ALP BLD-CCNC: 81 U/L
ALT SERPL-CCNC: 10 U/L
ANION GAP SERPL CALC-SCNC: 13 MMOL/L
APPEARANCE: CLEAR
AST SERPL-CCNC: 11 U/L
BASOPHILS # BLD AUTO: 0.06 K/UL
BASOPHILS NFR BLD AUTO: 1.3 %
BILIRUB SERPL-MCNC: 0.7 MG/DL
BILIRUBIN URINE: NEGATIVE
BLOOD URINE: NEGATIVE
BUN SERPL-MCNC: 19 MG/DL
CALCIUM SERPL-MCNC: 9.1 MG/DL
CHLORIDE SERPL-SCNC: 107 MMOL/L
CHOLEST SERPL-MCNC: 233 MG/DL
CO2 SERPL-SCNC: 24 MMOL/L
COLOR: NORMAL
COVID-19 NUCLEOCAPSID  GAM ANTIBODY INTERPRETATION: NEGATIVE
COVID-19 SPIKE DOMAIN ANTIBODY INTERPRETATION: POSITIVE
CREAT SERPL-MCNC: 1.05 MG/DL
CRP SERPL HS-MCNC: 0.44 MG/L
EGFR: 74 ML/MIN/1.73M2
EOSINOPHIL # BLD AUTO: 0.14 K/UL
EOSINOPHIL NFR BLD AUTO: 2.9 %
ESTIMATED AVERAGE GLUCOSE: 117 MG/DL
GLUCOSE BS SERPL-MCNC: 97 MG/DL
GLUCOSE QUALITATIVE U: NEGATIVE
GLUCOSE SERPL-MCNC: 103 MG/DL
HBA1C MFR BLD HPLC: 5.7 %
HCT VFR BLD CALC: 44.4 %
HDLC SERPL-MCNC: 78 MG/DL
HGB BLD-MCNC: 14.2 G/DL
IMM GRANULOCYTES NFR BLD AUTO: 0.2 %
KETONES URINE: NEGATIVE
LDLC SERPL CALC-MCNC: 139 MG/DL
LEUKOCYTE ESTERASE URINE: ABNORMAL
LYMPHOCYTES # BLD AUTO: 1.81 K/UL
LYMPHOCYTES NFR BLD AUTO: 38 %
MAN DIFF?: NORMAL
MCHC RBC-ENTMCNC: 28.1 PG
MCHC RBC-ENTMCNC: 32 GM/DL
MCV RBC AUTO: 87.7 FL
MONOCYTES # BLD AUTO: 0.61 K/UL
MONOCYTES NFR BLD AUTO: 12.8 %
NEUTROPHILS # BLD AUTO: 2.13 K/UL
NEUTROPHILS NFR BLD AUTO: 44.8 %
NITRITE URINE: NEGATIVE
NONHDLC SERPL-MCNC: 155 MG/DL
PH URINE: 6
PLATELET # BLD AUTO: 149 K/UL
POTASSIUM SERPL-SCNC: 4.3 MMOL/L
PROT SERPL-MCNC: 6.7 G/DL
PROTEIN URINE: NORMAL
PSA FREE FLD-MCNC: 23 %
PSA FREE SERPL-MCNC: 0.27 NG/ML
PSA SERPL-MCNC: 1.19 NG/ML
RBC # BLD: 5.06 M/UL
RBC # FLD: 14.7 %
SARS-COV-2 AB SERPL IA-ACNC: >250 U/ML
SARS-COV-2 AB SERPL QL IA: 0.1 INDEX
SODIUM SERPL-SCNC: 145 MMOL/L
SPECIFIC GRAVITY URINE: 1.03
T4 FREE SERPL-MCNC: 1.2 NG/DL
TRIGL SERPL-MCNC: 79 MG/DL
TSH SERPL-ACNC: 3.36 UIU/ML
UROBILINOGEN URINE: NORMAL
VIT B12 SERPL-MCNC: 1027 PG/ML
WBC # FLD AUTO: 4.76 K/UL

## 2022-08-10 ENCOUNTER — APPOINTMENT (OUTPATIENT)
Dept: CARDIOLOGY | Facility: CLINIC | Age: 77
End: 2022-08-10

## 2022-08-10 ENCOUNTER — NON-APPOINTMENT (OUTPATIENT)
Age: 77
End: 2022-08-10

## 2022-08-10 VITALS
TEMPERATURE: 98.2 F | WEIGHT: 152 LBS | OXYGEN SATURATION: 97 % | RESPIRATION RATE: 16 BRPM | BODY MASS INDEX: 25.33 KG/M2 | DIASTOLIC BLOOD PRESSURE: 71 MMHG | HEART RATE: 71 BPM | SYSTOLIC BLOOD PRESSURE: 114 MMHG | HEIGHT: 65 IN

## 2022-08-10 PROCEDURE — 99214 OFFICE O/P EST MOD 30 MIN: CPT | Mod: 25

## 2022-08-10 PROCEDURE — 93000 ELECTROCARDIOGRAM COMPLETE: CPT

## 2022-08-10 RX ORDER — ROSUVASTATIN CALCIUM 5 MG/1
5 TABLET, FILM COATED ORAL
Qty: 90 | Refills: 1 | Status: DISCONTINUED | COMMUNITY
Start: 2022-05-11 | End: 2022-08-10
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
## 2022-08-14 NOTE — CARDIOLOGY SUMMARY
[de-identified] : 5/6/22 normal sinus rhythm right bundle branch block left anterior fascicular block bi fascicular block [de-identified] : 5/11/22 ejection fraction 65% calcified trade leaflet aortic valve mild aortic stenosis increased relative wall thickness with normal left ventricular mass index consistent with concentric left ventricular remodeling [de-identified] : 5/10/22 ejection fraction 70% normal micro perfusion scan with no evidence of infarction or inducible ischemia [de-identified] : 5/16/22 no pulmonary embolism aortic root measures 3.3 cm ascending aorta 2.8 cm

## 2022-08-14 NOTE — DISCUSSION/SUMMARY
[FreeTextEntry1] : Prior CAT scans exams 2017 and ultrasound June 2019 describe a normal  aorta.  An  echo in 2019  also describes a normal ascending aorta. He is anxious to make a trip to [place]. We discussed the association of conduction system with coronary heart disease. I have asked Berry to undergo detailed cardiac testing in order to evaluate his overall cardiovascular risk.An assessment of both structural and functional heart disease was recommended to the patient.In this regard, an echocardiogram and a stress test and Holter monitor were advised to the patient. I await the upcoming noninvasive cardiac testing in order to assess his overall cardiovascular risk. We discussed the pros and cons of plain treadmill stress testing nuclear stress testing and angiography including a sensitivity analysis.We discussed current ACC guidelines and the calculated 10 year risk is approximately   25% which is severely elevated\par \par This represents a moderate amount of medical decision making based upon two or more more chronic illnesses  and the recommendation for a high risk medication  a  statin which require ongoing monitoring and evaluation for tolerance efficacy and compliance and the recommendation for a physiological test under stress, a stress test and echocardiogram\par \par \par More than half of the face to face encounter of    was spent in counseling the patient with respect to  \par \par Quality measures \par Tobacco intervention not indicated\par Statin for prevention of cardiovascular disease indicated\par Hypertension compensated\par Aspirin for ischemic vascular disease not indicated\par Tobacco screening cessation and intervention not indicated\par \par update 8/10/22\par  discussed possible myocardial perfusion image or even cardiac catheterization if signs and symptoms warrant return visit four months repeat lipids are requested\par \par Medical necessity\par This is a high encounter based upon two or more chronic illnesses with mild exacerbation requiring further management and evaluation. repeat studies as indicated  .\par \par EKG is indicated for evaluation of abnormal EKG\par \par risks benefits alternatives were discussed with the patient.\par all questions were answered to their satisfaction.

## 2022-09-27 LAB
ALBUMIN SERPL ELPH-MCNC: 4.6 G/DL
ALP BLD-CCNC: 83 U/L
ALT SERPL-CCNC: <5 U/L
ANION GAP SERPL CALC-SCNC: 11 MMOL/L
AST SERPL-CCNC: 10 U/L
BILIRUB SERPL-MCNC: 0.9 MG/DL
BUN SERPL-MCNC: 24 MG/DL
CALCIUM SERPL-MCNC: 9.5 MG/DL
CHLORIDE SERPL-SCNC: 109 MMOL/L
CHOLEST SERPL-MCNC: 167 MG/DL
CO2 SERPL-SCNC: 24 MMOL/L
CREAT SERPL-MCNC: 1.02 MG/DL
EGFR: 77 ML/MIN/1.73M2
GLUCOSE SERPL-MCNC: 105 MG/DL
HDLC SERPL-MCNC: 77 MG/DL
LDLC SERPL CALC-MCNC: 78 MG/DL
NONHDLC SERPL-MCNC: 90 MG/DL
POTASSIUM SERPL-SCNC: 4.5 MMOL/L
PROT SERPL-MCNC: 6.8 G/DL
SODIUM SERPL-SCNC: 145 MMOL/L
TRIGL SERPL-MCNC: 57 MG/DL

## 2022-09-28 ENCOUNTER — NON-APPOINTMENT (OUTPATIENT)
Age: 77
End: 2022-09-28

## 2022-10-07 NOTE — OCCUPATIONAL THERAPY INITIAL EVALUATION ADULT - ANTICIPATED DISCHARGE DISPOSITION, OT EVAL
Chief Complaint   Patient presents with   • Follow-up     LT WRIST MRI FOLLOW UP       History of Present Illness  Dylan is a 16 y.o. year old RHD male here today accompanied by his mom for follow-up of left wrist pain. Pain has been present for a while (maybe even years) with known injury or trauma that he can recall. Please see previous note for complete history and treatment course. At initial visit on 9/28/2022, his history and exam was concerning for TFCC injury and MRI was ordered. He returns today to review results. He has compliant with with his wrist brace, but is still having ulnar-sided pain all the time. He actually feels like symptoms have continued to worsen. He continues to have pain with any sort of activity or lifting. He has been resting and not participating in baseball. He has tried OTC NSAIDs, but do not feel like they provide any relief. Has not been icing because he says the ice hurts it. He sas seen previously by Dr. Pranav Mak at [facility] for a thumb injury in December.    Sports: baseball (catcher, OF)    MRI of the Left Wrist performed on 10/5/2022  Images personally reviewed and interpreted and agree with radiology report      Results for orders placed during the hospital encounter of 10/05/22  MRI Wrist Left Without Contrast    Narrative  MRI LEFT WRIST WITHOUT CONTRAST    HISTORY: Left wrist pain. Ulnar-sided pain. Pain for 2 years. Increasing  pain when playing baseball.    TECHNIQUE: MRI wrist includes axial T1, T2 as well as coronal T1, STIR,  PD fat-sat and sagittal T2-weighted sequences.    COMPARISON: Left wrist x-rays 09/28/2022.    FINDINGS: There is mild subchondral bone marrow edema within the  proximal, ulnar aspect of the lunate at a typical location for  ulnocarpal abutment. Ulnocarpal abutment typically is associated with a  triangular fibrocartilage tear though no tear is demonstrated. The  triangular fibrocartilage appears intact. No articular  "cartilage defect  or loose body is evident. Joint fluid volume at the distal radioulnar  joint, radiocarpal joint, intracarpal joint is within normal limits.  Bone marrow signal is otherwise normal. Extending dorsal to the distal  aspect of the scapholunate joint there is a 4 mm x 4 mm x 6 mm ganglion  cyst. There is no further evidence for intrinsic ligamentous disruption  along the proximal carpal row. Flexor and extensor tendons are intact.  Bone marrow signal is within normal limits.    Impression  1. Mild subchondral bone marrow edema within the proximal, ulnar aspect  of the lunate at a typical location for ulnocarpal abutment though there  is no further evidence for ulnocarpal abutment. Intact triangular  fibrocartilage.  2. Small, 4 x 4 x 6 mm ganglion cyst dorsal to the distal aspect of the  scapholunate joint. No further evidence for intrinsic ligamentous  disruption.    This report was finalized on 10/6/2022 2:55 PM by Dr. Guzman Samuel M.D.      /70 (BP Location: Right arm, Patient Position: Sitting, Cuff Size: Adult)   Pulse 72   Temp 98.7 °F (37.1 °C) (Temporal)   Ht 182.9 cm (72.01\")   SpO2 97%        Physical Exam  MSK Exam:  The left wrist is without obvious signs of acute bony deformity, erythema or ecchymosis. Mild swelling on the ulnar aspect. There is significant tenderness on the dorsal and volar ulnar sided joint line, including tenderness at the ulnar styloid and medial carpal bones. No tenderness of the anatomic snuffbox, first dorsal compartment, or scapholunate interval. Active range of motion is limited in all directions and painful, worse with extension and significantly worse with ulnar deviation. Strength testing of the wrist and forearm, including supination and pronation, are 4-/5 and painful. Significant pain with DRUJ Shuck and TFCC grind. Asked to perform press test, but refused stating that he knows that will be painful. Sensory and vascular exams are otherwise " "normal.   The opposite arm is normal. Gait is pain-free and tandem. No significant change from previous.    Assessment and Plan  Diagnoses and all orders for this visit:    1. Ulnocarpal abutment syndrome, left (Primary)  -     Ambulatory Referral to Hand Surgery    2. Pain of ulnar side of wrist  -     Ambulatory Referral to Hand Surgery    Patient is a 16-year-old RHD male here today with ulnar-sided left wrist pain that has been present for quite some time but with acute worsening over the past 2 months with no known injury or trauma.  MRI was ordered which showed \"edema within the proximal, ulnar aspect of the lunate at a typical location for ulnocarpal abutment though there is no further evidence for ulnocarpal abutment.\" Given his significant symptoms without even the slightest improvement with rest, bracing, and NSAIDs, I recommended referral to Ortho Hand for additional recommendations. He should continue using the brace. May continue with activity as tolerated, but should avoid painful or strenuous activity, including baseball. He should also continue with ice and OTC ibuprofen and/or Tylenol as needed for pain and swelling. He may follow-up as needed.  All of his and his mother's questions were answered and they are agreeable with the plan.    Dictated utilizing Dragon dictation.    " home w/ OT

## 2022-11-14 ENCOUNTER — APPOINTMENT (OUTPATIENT)
Dept: NEUROLOGY | Facility: CLINIC | Age: 77
End: 2022-11-14

## 2022-11-14 VITALS
BODY MASS INDEX: 24.99 KG/M2 | HEIGHT: 65 IN | WEIGHT: 150 LBS | HEART RATE: 74 BPM | DIASTOLIC BLOOD PRESSURE: 74 MMHG | SYSTOLIC BLOOD PRESSURE: 124 MMHG

## 2022-11-14 DIAGNOSIS — R20.0 ANESTHESIA OF SKIN: ICD-10-CM

## 2022-11-14 DIAGNOSIS — M54.31 SCIATICA, RIGHT SIDE: ICD-10-CM

## 2022-11-14 PROCEDURE — 99215 OFFICE O/P EST HI 40 MIN: CPT

## 2022-11-14 NOTE — PHYSICAL EXAM
[FreeTextEntry1] : Head:  Normocephalic Neck: Supple nontender no carotid bruits.  Spine:  Nontender negative straight leg raising.\par \par Mental Status:  Alert Oriented X3 Speech.  Mildly hypophonic\par \par Cranial Nerves: Mild facial masking.  Negative Myerson sign.  PERRL, Visual Fields full  EOMI no diplopia no ptosis no nystagmus, V through XII intact.\par \par Motor: Mild right upper extremity cogwheeling without significant tremor and mild right upper extremity drift.  Rapid movements are fairly symmetric and he has good power throughout.\par \par DTRs: Symmetric and 2+.  Plantars flexor.  No Clonus.\par \par Sensory:  Normal testing with pin light touch and vibration and  Joint position sense.  Normal DSS to touch.\par \par Gait:  Normal including tandem walking heel toe walking and Rhomberg.\par

## 2022-11-14 NOTE — ASSESSMENT
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  He has recently noted right lower extremity sciatica and and intermittent numbness of the right thumb.\par \par Recommendations: Continue carbidopa levodopa 25/100 mg 3 times daily.  Abstinence from alcohol.  Consider physical therapy for his lower back metastatic increases.  Consider trial of nighttime right wrist splint for the numbness of the right thumb.  Office follow-up in 6 months or else as needed.\par

## 2022-11-14 NOTE — HISTORY OF PRESENT ILLNESS
[FreeTextEntry1] : This patient is seen for an office visit with his wife.  His condition is basically stable.  There is improvement regarding right hand motor function and decrease in right hand tremor while taking carbidopa levodopa 25/100 mg 3 times daily.  He has only an occasional postural dizziness.  His gait remains improved.  He attends gym he swims and he does yoga on a regular basis.  His vivid dreams have decreased.  He is not having significant headache.  He denies any seizures\par \par .  Patient also describes numbness of his right thumb which occurs intermittently without pain right hand weakness with tremor.\par \par He has right-sided lower back pain with right posterior thigh pain suggestive of radiculopathy and this occurs only at night wall in bed.  He has pain in the left shoulder which also occurs at night at rest.\par \par His other medications are unchanged including tamsulosin finasteride and rosuvastatin.\par \par He is pursuing abstinence from alcohol.

## 2022-12-19 ENCOUNTER — APPOINTMENT (OUTPATIENT)
Dept: INTERNAL MEDICINE | Facility: CLINIC | Age: 77
End: 2022-12-19

## 2022-12-29 ENCOUNTER — NON-APPOINTMENT (OUTPATIENT)
Age: 77
End: 2022-12-29

## 2022-12-29 ENCOUNTER — APPOINTMENT (OUTPATIENT)
Dept: CARDIOLOGY | Facility: CLINIC | Age: 77
End: 2022-12-29
Payer: MEDICARE

## 2022-12-29 VITALS
TEMPERATURE: 97.1 F | BODY MASS INDEX: 24.66 KG/M2 | DIASTOLIC BLOOD PRESSURE: 67 MMHG | HEIGHT: 65 IN | WEIGHT: 148 LBS | RESPIRATION RATE: 16 BRPM | SYSTOLIC BLOOD PRESSURE: 111 MMHG | OXYGEN SATURATION: 97 % | HEART RATE: 74 BPM

## 2022-12-29 PROCEDURE — 93000 ELECTROCARDIOGRAM COMPLETE: CPT

## 2022-12-29 PROCEDURE — 99213 OFFICE O/P EST LOW 20 MIN: CPT | Mod: 25

## 2023-01-01 NOTE — OCCUPATIONAL THERAPY INITIAL EVALUATION ADULT - PATIENT/FAMILY/SIGNIFICANT OTHER GOALS STATEMENT, OT EVAL
bib mother with respiratory distress. Seen at [facility] and was sent to ED for further evaluation. febrile. tylenol given. Pt. improved with oxygen therapy and nebulizers. transferred to [facility]
"go home"

## 2023-01-02 NOTE — REASON FOR VISIT
[CV Risk Factors and Non-Cardiac Disease] : CV risk factors and non-cardiac disease [Arrhythmia/ECG Abnorrmalities] : arrhythmia/ECG abnormalities [Structural Heart and Valve Disease] : structural heart and valve disease [Coronary Artery Disease] : coronary artery disease [Spouse] : spouse [FreeTextEntry1] : Berry was recently seen by Dr. Barakat  and was told of a right bundle branch block a leaky valve and a history of thoracic aneurysm.  He comes today for clarification of  his overall cardiovascular risk. He was advised to undergo a complete cardiac evaluation. He denies chest pains shortness of breath or loss of consciousnes.\par \par update 8/10/22\par we discussed a right bundle branch block and right ventricular enlargement which may be due to secondhand smoke inhalation recent CAT scan is negative for aneurysm\par \par update 12/29/22\par no new cardiac symptoms for Berry\par \par

## 2023-01-02 NOTE — CARDIOLOGY SUMMARY
[de-identified] : 5/6/22 normal sinus rhythm right bundle branch block left anterior fascicular block bi fascicular block [de-identified] : 5/11/22 ejection fraction 65% calcified trade leaflet aortic valve mild aortic stenosis increased relative wall thickness with normal left ventricular mass index consistent with concentric left ventricular remodeling [de-identified] : 5/10/22 ejection fraction 70% normal micro perfusion scan with no evidence of infarction or inducible ischemia [de-identified] : 5/16/22 no pulmonary embolism aortic root measures 3.3 cm ascending aorta 2.8 cm

## 2023-01-02 NOTE — DISCUSSION/SUMMARY
[FreeTextEntry1] : Prior CAT scans exams 2017 and ultrasound June 2019 describe a normal  aorta.  An  echo in 2019  also describes a normal ascending aorta. He is anxious to make a trip to [place]. We discussed the association of conduction system with coronary heart disease. I have asked Berry to undergo detailed cardiac testing in order to evaluate his overall cardiovascular risk.An assessment of both structural and functional heart disease was recommended to the patient.In this regard, an echocardiogram and a stress test and Holter monitor were advised to the patient. I await the upcoming noninvasive cardiac testing in order to assess his overall cardiovascular risk. We discussed the pros and cons of plain treadmill stress testing nuclear stress testing and angiography including a sensitivity analysis.We discussed current ACC guidelines and the calculated 10 year risk is approximately   25% which is severely elevated\par \par This represents a moderate amount of medical decision making based upon two or more more chronic illnesses  and the recommendation for a high risk medication  a  statin which require ongoing monitoring and evaluation for tolerance efficacy and compliance and the recommendation for a physiological test under stress, a stress test and echocardiogram\par \par \par More than half of the face to face encounter of 25 minutes    was spent in counseling the patient with respect to   abnormal EKG\par \par \par Quality measures \par Tobacco intervention not indicated\par Statin for prevention of cardiovascular disease indicated\par Hypertension compensated\par Aspirin for ischemic vascular disease not indicated\par Tobacco screening cessation and intervention not indicated\par \par update 8/10/22\par  discussed possible myocardial perfusion image or even cardiac catheterization if signs and symptoms warrant return visit four months repeat lipids are requested\par \par \par 12/29/22\par we again discussed the findings of a bundle branch block which in this case the most likely degenerative absent significant coronary disease and will continue to follow symptomatically may use cardio mobile device or monitoring in order to assess for need for pacing we discuss that the current situation does not require a cardiac pacemaker\par \par Medical necessity\par This is a intermediate risk encounter based upon two or more chronic illnesses requiring ongoing monitoring\par \par EKG is indicated for evaluation of abnormal EKG\par \par risks benefits alternatives were discussed with the patient.\par all questions were answered to his satisfaction.

## 2023-03-07 ENCOUNTER — APPOINTMENT (OUTPATIENT)
Dept: CARDIOLOGY | Facility: CLINIC | Age: 78
End: 2023-03-07
Payer: MEDICARE

## 2023-03-10 ENCOUNTER — NON-APPOINTMENT (OUTPATIENT)
Age: 78
End: 2023-03-10

## 2023-03-20 ENCOUNTER — NON-APPOINTMENT (OUTPATIENT)
Age: 78
End: 2023-03-20

## 2023-03-20 PROCEDURE — 93224 XTRNL ECG REC UP TO 48 HRS: CPT

## 2023-05-17 ENCOUNTER — APPOINTMENT (OUTPATIENT)
Dept: NEUROLOGY | Facility: CLINIC | Age: 78
End: 2023-05-17
Payer: MEDICARE

## 2023-05-17 VITALS
WEIGHT: 145 LBS | BODY MASS INDEX: 24.16 KG/M2 | DIASTOLIC BLOOD PRESSURE: 77 MMHG | HEIGHT: 65 IN | SYSTOLIC BLOOD PRESSURE: 143 MMHG | OXYGEN SATURATION: 94 % | RESPIRATION RATE: 13 BRPM | HEART RATE: 72 BPM | TEMPERATURE: 97.2 F

## 2023-05-17 DIAGNOSIS — Z87.898 PERSONAL HISTORY OF OTHER SPECIFIED CONDITIONS: ICD-10-CM

## 2023-05-17 DIAGNOSIS — R26.89 OTHER ABNORMALITIES OF GAIT AND MOBILITY: ICD-10-CM

## 2023-05-17 PROCEDURE — 99215 OFFICE O/P EST HI 40 MIN: CPT

## 2023-05-17 NOTE — ASSESSMENT
[FreeTextEntry1] : Impression: This 77-year-old male patient has a history of traumatic brain injury 5/28/2020 at which time there was a left temporal hemorrhage and he has residual encephalomalacia in this regard on MRI.  In this setting he has parkinsonism predominating in the right upper extremity.  There has been a positive response to levodopa.  He describes postural dizziness suggestive of vertigo and imbalance.  Today's neurological exam reveals mild postural hypotension and there are findings which are unchanged post TBI and consistent with parkinsonism.\par \par Recommendations: I did suggest he reduce carbidopa levodopa as a trial to 1 tablet twice daily.  F DOPA PET/CT.  Vestibular rehabilitation at [facility] or [facility].  Consider repeating brain MRI.  Consider addition of midodrine.  Office follow-up.\par

## 2023-05-17 NOTE — PHYSICAL EXAM
[FreeTextEntry1] : Head:  Normocephalic Neck: Supple nontender no carotid bruits. \par \par Mental Status:  Alert Oriented X3 Speech mildly hypophonic.\par \par Cranial Nerves:  PERRL, Fundi normal Visual Fields full  EOMI no diplopia no ptosis.  No nystagmus with change of position, V through XII intact.  Mild facial masking unchanged.\par \par Motor: Mild right upper extremity cogwheeling without significant tremor.  There is mild right upper extremity drift and rapid movements are slowed right upper and lower limbs.\par \par DTRs: Symmetric and 2+.  Plantars flexor.  No Clonus.\par \par Sensory:  Normal testing with pin light touch and vibration and  Joint position sense. \par \par Gait: The gait is regular with decreased right arm swing.\par \par Blood pressure 128/70 supine and 115/70 standing.

## 2023-05-17 NOTE — HISTORY OF PRESENT ILLNESS
[FreeTextEntry1] : This patient is seen for an office visit with his wife.  He is improved regarding right hand motor function and there is a decrease in right hand tremor while taking carbidopa levodopa 25/100 mg 3 times daily.  Unfortunately he describes episodes of positional dizziness suggestive of vertigo.  He describes imbalance.  His complaints are associated with the upright posture.  He has tried Epley maneuver without improvement.  He denies headache or seizures.  His cognitive status is stable.\par \par Medications besides levodopa include tamsulosin finasteride rosuvastatin unchanged.\par \par He has been pursuing abstinence from alcohol.

## 2023-05-19 NOTE — PATIENT PROFILE ADULT - NSPROSPIRITUALVALUESFT_GEN_A_NUR
Baptism

## 2023-05-23 ENCOUNTER — NON-APPOINTMENT (OUTPATIENT)
Age: 78
End: 2023-05-23

## 2023-05-23 ENCOUNTER — APPOINTMENT (OUTPATIENT)
Dept: CARDIOLOGY | Facility: CLINIC | Age: 78
End: 2023-05-23
Payer: MEDICARE

## 2023-05-23 VITALS
BODY MASS INDEX: 24.99 KG/M2 | SYSTOLIC BLOOD PRESSURE: 117 MMHG | HEART RATE: 74 BPM | WEIGHT: 150 LBS | OXYGEN SATURATION: 98 % | DIASTOLIC BLOOD PRESSURE: 77 MMHG | HEIGHT: 65 IN

## 2023-05-23 PROCEDURE — 93000 ELECTROCARDIOGRAM COMPLETE: CPT

## 2023-05-23 PROCEDURE — 99214 OFFICE O/P EST MOD 30 MIN: CPT | Mod: 25

## 2023-05-24 NOTE — REASON FOR VISIT
[CV Risk Factors and Non-Cardiac Disease] : CV risk factors and non-cardiac disease [Arrhythmia/ECG Abnorrmalities] : arrhythmia/ECG abnormalities [Structural Heart and Valve Disease] : structural heart and valve disease [Coronary Artery Disease] : coronary artery disease [Spouse] : spouse [FreeTextEntry1] : Berry was recently seen by Dr. Barakat  and was told of a right bundle branch block a leaky valve and a history of thoracic aneurysm.  He comes today for clarification of  his overall cardiovascular risk. He was advised to undergo a complete cardiac evaluation. He denies chest pains shortness of breath or loss of consciousnes.\par \par update 8/10/22\par we discussed a right bundle branch block and right ventricular enlargement which may be due to secondhand smoke inhalation recent CAT scan is negative for aneurysm\par \par update 12/29/22\par no new cardiac symptoms for Berry\par \par Update 5/23/2023\par Berry plans a trip to his native [place] and inquires about his overall cardiovascular risk

## 2023-05-24 NOTE — DISCUSSION/SUMMARY
[FreeTextEntry1] : Prior CAT scans exams 2017 and ultrasound June 2019 describe a normal  aorta.  An  echo in 2019  also describes a normal ascending aorta. He is anxious to make a trip to [place]. We discussed the association of conduction system with coronary heart disease. I have asked Berry to undergo detailed cardiac testing in order to evaluate his overall cardiovascular risk.An assessment of both structural and functional heart disease was recommended to the patient.In this regard, an echocardiogram and a stress test and Holter monitor were advised to the patient. I await the upcoming noninvasive cardiac testing in order to assess his overall cardiovascular risk. We discussed the pros and cons of plain treadmill stress testing nuclear stress testing and angiography including a sensitivity analysis.We discussed current ACC guidelines and the calculated 10 year risk is approximately   25% which is severely elevated\par \par This represents a moderate amount of medical decision making based upon two or more more chronic illnesses  and the recommendation for a high risk medication  a  statin which require ongoing monitoring and evaluation for tolerance efficacy and compliance and the recommendation for a physiological test under stress, a stress test and echocardiogram\par \par 5/23/2023\par  I had a long discussion with Berry and his wife with respect to his EKG which demonstrates 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 and bifascicular block I discussed the possibility of advanced AV block and the possible need for pacing if and when his conduction system worsens although currently he is asymptomatic but increased risk for advanced AV block we also discussed the possibility that the nuclear stress test may be insensitive to advanced coronary disease and may even represent a balanced ischemic picture that notwithstanding I have asked Cee to schedule a consultation with electrophysiology with respect to the abnormal EKG and travel was left his judgment although he has no clear-cut contraindications\par \par \par More than half of the face to face encounter of 25 minutes    was spent in counseling the patient with respect to   abnormal EKG\par \par \par Quality measures \par Tobacco intervention not indicated\par Statin for prevention of cardiovascular disease indicated\par Hypertension compensated\par Aspirin for ischemic vascular disease not indicated\par Tobacco screening cessation and intervention not indicated\par \par update 8/10/22\par  discussed possible myocardial perfusion image or even cardiac catheterization if signs and symptoms warrant return visit four months repeat lipids are requested\par \par \par 12/29/22\par we again discussed the findings of a bundle branch block which in this case the most likely degenerative absent significant coronary disease and will continue to follow symptomatically may use cardio mobile device or monitoring in order to assess for need for pacing we discuss that the current situation does not require a cardiac pacemaker\par \par Medical necessity\par This is a intermediate risk encounter based upon two or more chronic illnesses requiring ongoing monitoring\par \par EKG is indicated for evaluation of abnormal EKG\par \par risks benefits alternatives were discussed with the patient.\par all questions were answered to his satisfaction.

## 2023-05-24 NOTE — CARDIOLOGY SUMMARY
[de-identified] : 5/6/22 normal sinus rhythm right bundle branch block left anterior fascicular block bi fascicular block [de-identified] : 5/11/22 ejection fraction 65% calcified trade leaflet aortic valve mild aortic stenosis increased relative wall thickness with normal left ventricular mass index consistent with concentric left ventricular remodeling [de-identified] : 5/10/22 ejection fraction 70% normal micro perfusion scan with no evidence of infarction or inducible ischemia [de-identified] : 5/16/22 no pulmonary embolism aortic root measures 3.3 cm ascending aorta 2.8 cm

## 2023-05-31 ENCOUNTER — NON-APPOINTMENT (OUTPATIENT)
Age: 78
End: 2023-05-31

## 2023-07-20 ENCOUNTER — OUTPATIENT (OUTPATIENT)
Dept: OUTPATIENT SERVICES | Facility: HOSPITAL | Age: 78
LOS: 1 days | End: 2023-07-20
Payer: MEDICARE

## 2023-07-20 ENCOUNTER — APPOINTMENT (OUTPATIENT)
Dept: CT IMAGING | Facility: CLINIC | Age: 78
End: 2023-07-20
Payer: MEDICARE

## 2023-07-20 DIAGNOSIS — I45.2 BIFASCICULAR BLOCK: ICD-10-CM

## 2023-07-20 PROCEDURE — 75574 CT ANGIO HRT W/3D IMAGE: CPT | Mod: 26

## 2023-07-20 PROCEDURE — 75574 CT ANGIO HRT W/3D IMAGE: CPT

## 2023-07-24 ENCOUNTER — APPOINTMENT (OUTPATIENT)
Dept: ELECTROPHYSIOLOGY | Facility: CLINIC | Age: 78
End: 2023-07-24
Payer: MEDICARE

## 2023-07-24 ENCOUNTER — NON-APPOINTMENT (OUTPATIENT)
Age: 78
End: 2023-07-24

## 2023-07-24 VITALS
SYSTOLIC BLOOD PRESSURE: 149 MMHG | RESPIRATION RATE: 18 BRPM | BODY MASS INDEX: 24.99 KG/M2 | WEIGHT: 150 LBS | DIASTOLIC BLOOD PRESSURE: 72 MMHG | HEIGHT: 65 IN | OXYGEN SATURATION: 98 % | HEART RATE: 77 BPM | TEMPERATURE: 92.3 F

## 2023-07-24 PROCEDURE — 99203 OFFICE O/P NEW LOW 30 MIN: CPT | Mod: 25

## 2023-07-24 PROCEDURE — 93000 ELECTROCARDIOGRAM COMPLETE: CPT

## 2023-07-24 NOTE — DISCUSSION/SUMMARY
[FreeTextEntry1] : In summary, this is a 77-year-old man with a history of asymptomatic bifascicular block.  He has no symptoms related to bradyarrhythmia.  We discussed remaining vigilant for the onset of the symptoms and I recommended only conservative follow-up including serial ECGs.  He will follow-up as needed for further arrhythmia care.\par \par He appeared to understand the whole discussion and verbalized that all of his questions were answered to his satisfaction.\par \par Thank you for allowing me to be involved in the care of this pleasant man. Please feel free to contact me with any questions.\par \par \par \par Seth Sorto MD\par  of Cardiology\par Electrophysiology Section\par [address] [place] [address], [address] [place] [address]\[place] [address]\par Office: [phone]\par Fax: [phone]\par  [EKG obtained to assist in diagnosis and management of assessed problem(s)] : EKG obtained to assist in diagnosis and management of assessed problem(s)

## 2023-07-24 NOTE — HISTORY OF PRESENT ILLNESS
[FreeTextEntry1] : Mr. Berry Dudley was seen today in the cardiac electrophysiology clinic. He has a history significant for traumatic brain injury, hyperlipidemia and bifascicular block with first degree AVB.  He has no exertional intolerance and denies any incidence of CP, palpitations, dizziness, or syncope.\par \par TTE (5/11/2022) showed normal left ventricular size and function with mild LVH, right ventricular dilatation (not quantified), mild TR, mild AS.  Holter monitoring 3/10/2023 showed no significant arrhythmia. Recent coronary CT showed a CAC in the 600s and otherwise mild disease.

## 2023-07-25 ENCOUNTER — NON-APPOINTMENT (OUTPATIENT)
Age: 78
End: 2023-07-25

## 2023-07-31 ENCOUNTER — APPOINTMENT (OUTPATIENT)
Dept: INTERNAL MEDICINE | Facility: CLINIC | Age: 78
End: 2023-07-31

## 2023-08-08 ENCOUNTER — RESULT REVIEW (OUTPATIENT)
Age: 78
End: 2023-08-08

## 2023-08-08 ENCOUNTER — APPOINTMENT (OUTPATIENT)
Dept: INTERNAL MEDICINE | Facility: CLINIC | Age: 78
End: 2023-08-08
Payer: MEDICARE

## 2023-08-08 VITALS
RESPIRATION RATE: 16 BRPM | DIASTOLIC BLOOD PRESSURE: 70 MMHG | WEIGHT: 150 LBS | BODY MASS INDEX: 24.99 KG/M2 | OXYGEN SATURATION: 96 % | HEART RATE: 60 BPM | TEMPERATURE: 97 F | SYSTOLIC BLOOD PRESSURE: 112 MMHG | HEIGHT: 65 IN

## 2023-08-08 DIAGNOSIS — I45.2 BIFASCICULAR BLOCK: ICD-10-CM

## 2023-08-08 DIAGNOSIS — M54.50 LOW BACK PAIN, UNSPECIFIED: ICD-10-CM

## 2023-08-08 DIAGNOSIS — G93.89 OTHER SPECIFIED DISORDERS OF BRAIN: ICD-10-CM

## 2023-08-08 DIAGNOSIS — Z87.442 PERSONAL HISTORY OF URINARY CALCULI: ICD-10-CM

## 2023-08-08 DIAGNOSIS — M48.061 SPINAL STENOSIS, LUMBAR REGION WITHOUT NEUROGENIC CLAUDICATION: ICD-10-CM

## 2023-08-08 DIAGNOSIS — I61.9 NONTRAUMATIC INTRACEREBRAL HEMORRHAGE, UNSPECIFIED: ICD-10-CM

## 2023-08-08 PROCEDURE — G0439: CPT

## 2023-08-08 NOTE — ASSESSMENT
[FreeTextEntry1] : *Traumatic brain injury complicated by intracranial bleed leading to left temporal encephalomalacia And parkinsonism.  No seizure activity.  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  Seemingly improved on Sinemet.  *Right low back pain in supine position.   Describes right  buttock pain radiating  down posterior thigh while supine.Puzzling presentation.  Atraumatic.  Provocative maneuvers such as   Femoral head compression trochanteric palpation SI joint loading,lumbar extension and flexion do not worsen the discomfort and the pain is resolved with weightbearing.  Recommend he trial Tylenol 1000 mg and ibuprofen 600 mg and lay flat.  Can trial gabapentin. He is to let me know how the combination works.   Plan CRP, lumbar hip and pelvis plain films.  *Nephrolithiasis.  twice in the 2000's both requiring lithotripsy, no recurrence since.  *BPH.  Proscar Flomax twice daily with 2-4 times nightly nocturia.  Sees Dr. Calderon twice a year.  *Hyperlipidemia.  Crestor 10 mg with reassuring lipid panel September 2022.  TTE 2022 mild LVH patient not on ACE inhibitor, normotensive, with some degree of RV dilatation without history of pulmonary embolism.   2022 Adenosine sestamibi  without evidence of infarct or ischemia.2023 CT coronary angiogram  with less than 24% stenosis in all vessels except the LAD ostia 25 to 49%.  Holter 2023 without arrhythmia.. Cardiology following.  *  Bilateral upper extremity paresthesia.  Very positional consistent with impingement.  Some diminution in pulse with passive abduction  external rotation suggest possible thoracic outlet.  I cannot reproduce his pain by compression of the median or ulnar nerve though the history is most suggestive of such.  He seemed quite worried by this however I suggested that he simply try to position his arms more comfortably while asleep.  *Impaired fasting glucose.  A1c 5.7% 2022.  Update.  *Chronic mild thrombocytopenia.  1 30-1 50, with family history with thrombocytopenia as well.  Differential otherwise normal.  Update CBC.  Has not been screened for HCV HIV, somewhat unusual in this age group but deemed an indicator condition so we will obtain.  *Remote right T12 shingles, early 2000's.  Recommended Shingrix.  *Marital discord.  Patient thinks his wife drinks too much and becomes more irritable and short tempered and this causes him considerable distress.  She responds that she likes the way it initially makes her feel though not the way it makes her behave.  (See her note).  They tried counseling without benefit in the past.  *  Routine adult health maintenance.  Due for TSH CMP vitamin D.  Vaccination: Patient declines all vaccinations.  Of concern, he had shingles in the right lower quadrant of his abdomen 10 to 15 years ago never received Shingrix.  Suggested he pursue this at his pharmacy.  It was a pleasure to visit with Mr. Dudley today.  Tried to answer his numerous questions as best I could.  Disposition follow-up after labs,  X-rays; let me know how more aggressive prophylaxis helps him lay flat.

## 2023-08-08 NOTE — HEALTH RISK ASSESSMENT
[Very Good] : ~his/her~  mood as very good [2 - 4 times a month (2 pts)] : 2-4 times a month (2 points) [1 or 2 (0 pts)] : 1 or 2 (0 points) [No] : In the past 12 months have you used drugs other than those required for medical reasons? No [No falls in past year] : Patient reported no falls in the past year [1] : 2) Feeling down, depressed, or hopeless for several days (1) [PHQ-2 Positive] : PHQ-2 Positive [PHQ-9 Negative - No further assessment needed] : PHQ-9 Negative - No further assessment needed [de-identified] :   Fall while intoxicated, complicated by traumatic brain injury, 2019. [Audit-CScore] : 2 [de-identified] :  tries to remain active [de-identified] :  healthy [Tony] :  9 seconds [[id number]] : 2 [Patient declined bone density test] : Patient declined bone density test [HIV test declined] : HIV test declined [Hepatitis C test declined] : Hepatitis C test declined [Change in mental status noted] : No change in mental status noted [Language] : denies difficulty with language [Behavior] : denies difficulty with behavior [Learning/Retaining New Information] : denies difficulty learning/retaining new information [Handling Complex Tasks] : denies difficulty handling complex tasks [Reasoning] : denies difficulty with reasoning [Spatial Ability and Orientation] : denies difficulty with spatial ability and orientation [None] : None [With Significant Other] : lives with significant other [# of Members in Household ___] :  household currently consist of [unfilled] member(s) [Retired] : retired [College] : College [] :  [Fully functional (bathing, dressing, toileting, transferring, walking, feeding)] : Fully functional (bathing, dressing, toileting, transferring, walking, feeding) [Fully functional (using the telephone, shopping, preparing meals, housekeeping, doing laundry, using] : Fully functional and needs no help or supervision to perform IADLs (using the telephone, shopping, preparing meals, housekeeping, doing laundry, using transportation, managing medications and managing finances) [Reports changes in hearing] : Reports no changes in hearing [Reports changes in vision] : Reports no changes in vision [Reports normal functional visual acuity (ie: able to read med bottle)] : Reports normal functional visual acuity [Reports changes in dental health] : Reports no changes in dental health [Smoke Detector] : smoke detector [Carbon Monoxide Detector] : carbon monoxide detector [Guns at Home] : no guns at home [Safety elements used in home] : safety elements used in home [Seat Belt] :  uses seat belt [Sunscreen] : does not use sunscreen [Travel to Developing Areas] : does not  travel to developing areas [TB Exposure] : is not being exposed to tuberculosis [Caregiver Concerns] : does not have caregiver concerns [ColonoscopyDate] : 08/21 [ColonoscopyComments] :  Cologuard negative [FreeTextEntry2] :  retired  [Never] : Never

## 2023-08-08 NOTE — PHYSICAL EXAM
[de-identified] :   Positive Adson's maneuver for thoracic outlet. [de-identified] :   Mild paraspinal muscle spasm.  No lumbar spinous process point tenderness.  No herpetiform eruption. [de-identified] :   No SI joint or trochanteric tenderness.  Negative Aston's test. [de-identified] :  symmetric DTRs.  Negative straight leg raise.  Lumbar flexion and extension do not reproduce his pain.  Palpation of the ulnar nerve percussion of the median nerve does not reproduce his pain. [de-identified] :   Flat affect.  Appears anxious.

## 2023-08-08 NOTE — HISTORY OF PRESENT ILLNESS
[Spouse] : spouse [FreeTextEntry1] : CPE, establish care [de-identified] : Most pleasant 77-year-old white male retired  with history of traumatic brain injury, BPH, hyperlipidemia, mild thrombocytopenia, and parkinsonism on Sinemet comes in for annual evaluation following his providers departure from the practice.  Last seen in the clinic with labs April - May 2022.  Has been working with Dr. Leiva in neurology who organized a DaTscan.  Unfortunately patient developed right low back and buttock pain radiating down posterior thigh,without antecedent trauma.  He has taken 2 Advil twice daily without relief.  He is understandably concerned about being able to lay flat for 45 minutes and a DaTscan    Is worried that he has painful paresthesia in his distal upper extremities when he lays with his arms by his side, quickly relieved by movement.

## 2023-08-09 ENCOUNTER — RX RENEWAL (OUTPATIENT)
Age: 78
End: 2023-08-09

## 2023-08-09 RX ORDER — ROSUVASTATIN CALCIUM 10 MG/1
10 TABLET, FILM COATED ORAL
Qty: 90 | Refills: 3 | Status: ACTIVE | COMMUNITY
Start: 2022-08-10 | End: 1900-01-01

## 2023-08-10 ENCOUNTER — APPOINTMENT (OUTPATIENT)
Dept: NEUROLOGY | Facility: CLINIC | Age: 78
End: 2023-08-10
Payer: MEDICARE

## 2023-08-10 VITALS
TEMPERATURE: 98.2 F | WEIGHT: 148 LBS | OXYGEN SATURATION: 97 % | BODY MASS INDEX: 25.27 KG/M2 | HEIGHT: 64 IN | HEART RATE: 75 BPM | DIASTOLIC BLOOD PRESSURE: 59 MMHG | SYSTOLIC BLOOD PRESSURE: 94 MMHG

## 2023-08-10 VITALS
HEART RATE: 75 BPM | SYSTOLIC BLOOD PRESSURE: 94 MMHG | DIASTOLIC BLOOD PRESSURE: 59 MMHG | WEIGHT: 148 LBS | HEIGHT: 64 IN | BODY MASS INDEX: 25.27 KG/M2

## 2023-08-10 PROCEDURE — 99215 OFFICE O/P EST HI 40 MIN: CPT

## 2023-08-10 RX ORDER — MECLIZINE HYDROCHLORIDE 12.5 MG/1
12.5 TABLET ORAL 3 TIMES DAILY
Qty: 30 | Refills: 2 | Status: COMPLETED | COMMUNITY
Start: 2022-12-01 | End: 2023-08-10

## 2023-08-10 NOTE — PHYSICAL EXAM
[FreeTextEntry1] : Head:  Normocephalic Neck: Supple nontender no carotid bruits.    Mental Status:  Alert Oriented X3 Speech normal and no aphasia or dysarthria.  Mild hypophonia.  Cranial Nerves:  PERRL, Fundi normal Visual Fields full  EOMI no diplopia no ptosis no nystagmus, V through XII intact.  Mild facial masking.  Motor: Mild right upper extremity cogwheeling without significant tremor.  Mild decreased right hand rapid alternating movements compared to left.  DTRs: Symmetric and 2+.  Plantars flexor.  No Clonus.  Sensory:  Normal testing with pin and touch.  Gait: Regular with mild decreased arm swing though improved in this regard.  Tinel's sign negative at both elbows and wrists.

## 2023-08-10 NOTE — REASON FOR VISIT
[Follow-Up: _____] : a [unfilled] follow-up visit [FreeTextEntry1] : Traumatic brain injury, parkinsonism, recent low back pain and sciatica, chronic upper extremity paresthesia and history of vertigo

## 2023-08-10 NOTE — ASSESSMENT
[FreeTextEntry1] : Impression: This 77-year-old male patient has a history of traumatic brain injury 5/28/2020 which time there was a left temporal lobe hemorrhage and there is residual encephalomalacia on MRI.  He does have parkinsonism predominating in the right upper extremity.  Suspect Parkinson's disease.  There has been a positive response in my opinion to levodopa.  Rule out posttraumatic etiology.  He did have vertigo which has resolved.    He has developed right-sided lower back and buttock pain radiating to right posterior thigh consistent with radiculopathy and worse when he is supine.  Rule out lumbar spinal stenosis.  Rule out lumbar disc herniation.  His neurological exam is unremarkable in this regard.  He also has a chronic history of paresthesia and both upper extremities when supine with his upper extremities extended beginning in both thumbs and also involving the right forearm.  Rule out bilateral carpal tunnel syndrome which can be position related.  Rule out other etiologies.  He is exam does not reveal findings of cervical myeloradiculopathy.  Recommendations: Continue carbidopa levodopa 25/100 mg 3 times daily.  EMG/NCV of the upper extremities.  Physical therapy for his lower back pain and right lumbar radiculopathy.  Medrol Dosepak 4 mg as directed.  Lumbar MRI noncontrast.  Office follow-up.

## 2023-08-10 NOTE — HISTORY OF PRESENT ILLNESS
[FreeTextEntry1] : This patient is seen for an office visit.  Since I last saw him on 5/17/2023 he has been taking carbidopa levodopa 25/100 mg 3 times daily.  The right hand tremor and bradykinesia have improved.  His wife still notices her 's decreased right arm swing.  He did have episodes of vertigo which have resolved.  He has had no seizures.    Beginning mid July is noted right-sided lower back and buttock pain which occasionally radiates into the posterior thigh.  This pain only occurs when he is in the supine position and makes it difficult for him to sleep at night.  He has tried ibuprofen and Tylenol without improvement. He denies any sensory loss or motor weakness or bowel or bladder problems in association.  He also describes a chronic for years complaint of paresthesia and pain beginning in both hands more right than left and starting in the right thumb which can increase with pain in the right forearm.  This complaint only occurs when he is supine with both arms extended for approximately 15 minutes.  He gets relief when he changes position.  He is not having associated neck pain or weakness in the upper extremities and the lower extremities are not involved.

## 2023-08-16 ENCOUNTER — RX RENEWAL (OUTPATIENT)
Age: 78
End: 2023-08-16

## 2023-08-16 RX ORDER — CARBIDOPA AND LEVODOPA 25; 100 MG/1; MG/1
25-100 TABLET ORAL 3 TIMES DAILY
Qty: 270 | Refills: 3 | Status: ACTIVE | COMMUNITY
Start: 2021-04-08 | End: 1900-01-01

## 2023-08-24 ENCOUNTER — APPOINTMENT (OUTPATIENT)
Dept: RADIOLOGY | Facility: HOSPITAL | Age: 78
End: 2023-08-24
Payer: MEDICARE

## 2023-08-24 ENCOUNTER — OUTPATIENT (OUTPATIENT)
Dept: OUTPATIENT SERVICES | Facility: HOSPITAL | Age: 78
LOS: 1 days | End: 2023-08-24
Payer: MEDICARE

## 2023-08-24 DIAGNOSIS — M54.50 LOW BACK PAIN, UNSPECIFIED: ICD-10-CM

## 2023-08-24 PROCEDURE — 72040 X-RAY EXAM NECK SPINE 2-3 VW: CPT

## 2023-08-24 PROCEDURE — 72110 X-RAY EXAM L-2 SPINE 4/>VWS: CPT | Mod: 26

## 2023-08-24 PROCEDURE — 73502 X-RAY EXAM HIP UNI 2-3 VIEWS: CPT | Mod: 26,RT

## 2023-08-24 PROCEDURE — 73502 X-RAY EXAM HIP UNI 2-3 VIEWS: CPT

## 2023-08-24 PROCEDURE — 72110 X-RAY EXAM L-2 SPINE 4/>VWS: CPT

## 2023-08-24 PROCEDURE — 72040 X-RAY EXAM NECK SPINE 2-3 VW: CPT | Mod: 26

## 2023-08-28 ENCOUNTER — APPOINTMENT (OUTPATIENT)
Dept: MRI IMAGING | Facility: HOSPITAL | Age: 78
End: 2023-08-28
Payer: MEDICARE

## 2023-08-28 ENCOUNTER — OUTPATIENT (OUTPATIENT)
Dept: OUTPATIENT SERVICES | Facility: HOSPITAL | Age: 78
LOS: 1 days | End: 2023-08-28
Payer: MEDICARE

## 2023-08-28 DIAGNOSIS — M54.16 RADICULOPATHY, LUMBAR REGION: ICD-10-CM

## 2023-08-28 PROCEDURE — 72148 MRI LUMBAR SPINE W/O DYE: CPT | Mod: 26

## 2023-08-28 PROCEDURE — 72148 MRI LUMBAR SPINE W/O DYE: CPT

## 2023-08-31 ENCOUNTER — APPOINTMENT (OUTPATIENT)
Dept: NUCLEAR MEDICINE | Facility: IMAGING CENTER | Age: 78
End: 2023-08-31

## 2023-09-11 ENCOUNTER — NON-APPOINTMENT (OUTPATIENT)
Age: 78
End: 2023-09-11

## 2023-09-19 ENCOUNTER — APPOINTMENT (OUTPATIENT)
Dept: NEUROLOGY | Facility: CLINIC | Age: 78
End: 2023-09-19

## 2023-09-19 ENCOUNTER — APPOINTMENT (OUTPATIENT)
Dept: NEUROLOGY | Facility: CLINIC | Age: 78
End: 2023-09-19
Payer: MEDICARE

## 2023-09-19 VITALS
HEART RATE: 76 BPM | DIASTOLIC BLOOD PRESSURE: 75 MMHG | WEIGHT: 148 LBS | TEMPERATURE: 98.2 F | HEIGHT: 64 IN | SYSTOLIC BLOOD PRESSURE: 123 MMHG | OXYGEN SATURATION: 98 % | BODY MASS INDEX: 25.27 KG/M2

## 2023-09-19 VITALS
DIASTOLIC BLOOD PRESSURE: 75 MMHG | HEIGHT: 64 IN | SYSTOLIC BLOOD PRESSURE: 123 MMHG | BODY MASS INDEX: 25.27 KG/M2 | HEART RATE: 76 BPM | WEIGHT: 148 LBS

## 2023-09-19 PROCEDURE — 95886 MUSC TEST DONE W/N TEST COMP: CPT | Mod: 59

## 2023-09-19 PROCEDURE — 95912 NRV CNDJ TEST 11-12 STUDIES: CPT

## 2023-09-19 PROCEDURE — 99215 OFFICE O/P EST HI 40 MIN: CPT | Mod: 25

## 2023-09-22 ENCOUNTER — APPOINTMENT (OUTPATIENT)
Dept: INTERNAL MEDICINE | Facility: CLINIC | Age: 78
End: 2023-09-22
Payer: MEDICARE

## 2023-09-22 VITALS
BODY MASS INDEX: 24.24 KG/M2 | HEIGHT: 64 IN | WEIGHT: 142 LBS | HEART RATE: 68 BPM | DIASTOLIC BLOOD PRESSURE: 80 MMHG | RESPIRATION RATE: 16 BRPM | TEMPERATURE: 97.6 F | SYSTOLIC BLOOD PRESSURE: 130 MMHG | OXYGEN SATURATION: 98 %

## 2023-09-22 DIAGNOSIS — M85.80 OTHER SPECIFIED DISORDERS OF BONE DENSITY AND STRUCTURE, UNSPECIFIED SITE: ICD-10-CM

## 2023-09-22 DIAGNOSIS — B00.1 HERPESVIRAL VESICULAR DERMATITIS: ICD-10-CM

## 2023-09-22 LAB
25(OH)D3 SERPL-MCNC: 41.8 NG/ML
ALBUMIN SERPL ELPH-MCNC: 4.5 G/DL
ALP BLD-CCNC: 74 U/L
ALT SERPL-CCNC: 17 U/L
ANION GAP SERPL CALC-SCNC: 10 MMOL/L
AST SERPL-CCNC: 13 U/L
BILIRUB SERPL-MCNC: 1 MG/DL
BUN SERPL-MCNC: 19 MG/DL
CALCIUM SERPL-MCNC: 9.4 MG/DL
CHLORIDE SERPL-SCNC: 105 MMOL/L
CHOLEST SERPL-MCNC: 158 MG/DL
CO2 SERPL-SCNC: 26 MMOL/L
CREAT SERPL-MCNC: 1.25 MG/DL
CRP SERPL-MCNC: <3 MG/L
EGFR: 60 ML/MIN/1.73M2
ESTIMATED AVERAGE GLUCOSE: 114 MG/DL
FOLATE SERPL-MCNC: 19.2 NG/ML
GGT SERPL-CCNC: 8 U/L
GLUCOSE BS SERPL-MCNC: 89 MG/DL
HBA1C MFR BLD HPLC: 5.6 %
HCV AB SER QL: NONREACTIVE
HCV S/CO RATIO: 0.08 S/CO
HDLC SERPL-MCNC: 74 MG/DL
HIV1+2 AB SPEC QL IA.RAPID: NONREACTIVE
LDLC SERPL CALC-MCNC: 70 MG/DL
NONHDLC SERPL-MCNC: 85 MG/DL
POTASSIUM SERPL-SCNC: 4.1 MMOL/L
PROT SERPL-MCNC: 6.9 G/DL
SODIUM SERPL-SCNC: 142 MMOL/L
TRIGL SERPL-MCNC: 76 MG/DL
TSH SERPL-ACNC: 2.36 UIU/ML
VIT B12 SERPL-MCNC: 700 PG/ML

## 2023-09-22 PROCEDURE — XXXXX: CPT | Mod: 1L

## 2023-09-22 PROCEDURE — 99214 OFFICE O/P EST MOD 30 MIN: CPT | Mod: 1L

## 2023-09-22 RX ORDER — METHYLPREDNISOLONE 4 MG/1
4 TABLET ORAL
Qty: 1 | Refills: 0 | Status: DISCONTINUED | COMMUNITY
Start: 2023-08-10 | End: 2023-09-22

## 2023-10-03 ENCOUNTER — APPOINTMENT (OUTPATIENT)
Dept: ORTHOPEDIC SURGERY | Facility: CLINIC | Age: 78
End: 2023-10-03
Payer: MEDICARE

## 2023-10-03 PROCEDURE — 99203 OFFICE O/P NEW LOW 30 MIN: CPT

## 2023-10-06 ENCOUNTER — OUTPATIENT (OUTPATIENT)
Dept: OUTPATIENT SERVICES | Facility: HOSPITAL | Age: 78
LOS: 1 days | End: 2023-10-06
Payer: MEDICARE

## 2023-10-06 ENCOUNTER — APPOINTMENT (OUTPATIENT)
Dept: RADIOLOGY | Facility: HOSPITAL | Age: 78
End: 2023-10-06
Payer: MEDICARE

## 2023-10-06 DIAGNOSIS — M85.80 OTHER SPECIFIED DISORDERS OF BONE DENSITY AND STRUCTURE, UNSPECIFIED SITE: ICD-10-CM

## 2023-10-06 PROCEDURE — 77080 DXA BONE DENSITY AXIAL: CPT

## 2023-10-06 PROCEDURE — 77080 DXA BONE DENSITY AXIAL: CPT | Mod: 26

## 2023-12-04 ENCOUNTER — APPOINTMENT (OUTPATIENT)
Dept: NEUROLOGY | Facility: CLINIC | Age: 78
End: 2023-12-04
Payer: MEDICARE

## 2023-12-04 VITALS
WEIGHT: 142 LBS | TEMPERATURE: 97.9 F | HEART RATE: 72 BPM | BODY MASS INDEX: 24.24 KG/M2 | OXYGEN SATURATION: 97 % | DIASTOLIC BLOOD PRESSURE: 71 MMHG | SYSTOLIC BLOOD PRESSURE: 115 MMHG | HEIGHT: 64 IN

## 2023-12-04 VITALS
WEIGHT: 142 LBS | HEIGHT: 64 IN | BODY MASS INDEX: 24.24 KG/M2 | HEART RATE: 72 BPM | DIASTOLIC BLOOD PRESSURE: 71 MMHG | SYSTOLIC BLOOD PRESSURE: 115 MMHG

## 2023-12-04 DIAGNOSIS — R42 DIZZINESS AND GIDDINESS: ICD-10-CM

## 2023-12-04 PROCEDURE — 99215 OFFICE O/P EST HI 40 MIN: CPT

## 2024-01-02 ENCOUNTER — APPOINTMENT (OUTPATIENT)
Dept: INTERNAL MEDICINE | Facility: CLINIC | Age: 79
End: 2024-01-02
Payer: MEDICARE

## 2024-01-02 VITALS
HEIGHT: 64 IN | HEART RATE: 70 BPM | WEIGHT: 142 LBS | TEMPERATURE: 97 F | BODY MASS INDEX: 24.24 KG/M2 | RESPIRATION RATE: 16 BRPM | SYSTOLIC BLOOD PRESSURE: 112 MMHG | OXYGEN SATURATION: 97 % | DIASTOLIC BLOOD PRESSURE: 70 MMHG

## 2024-01-02 DIAGNOSIS — K52.9 NONINFECTIVE GASTROENTERITIS AND COLITIS, UNSPECIFIED: ICD-10-CM

## 2024-01-02 PROCEDURE — 99213 OFFICE O/P EST LOW 20 MIN: CPT

## 2024-01-02 NOTE — PHYSICAL EXAM
[Normal] : affect was normal and insight and judgment were intact [de-identified] : Hyperactive bowel sounds, soft nondistended abdomen no guarding or tenderness.

## 2024-01-02 NOTE — ASSESSMENT
[FreeTextEntry1] : *Diarrhea Hourly painless diarrhea occasionally awakening him from sleep.No recent antibiotics.  No sick contacts.  They have been eating the same food.  Weight is up 146 from 142 previous hydrating well by history for which we encouraged ongoing p.o. electrolytes and will obtain stool sample.  Encouraged ongoing hydration, will obtain stool sample and treat from there.  May be candidate for Lomotil if needed, particularly if imodium proves ineffective.  I asked him if he could to hold off on Imodium more Pepto-Bismol until we ruled out toxin producing infectious enterocolitis.

## 2024-01-02 NOTE — HISTORY OF PRESENT ILLNESS
[FreeTextEntry1] : Diarrhea [de-identified] : Most pleasant 78-year-old white male retired  with history of traumatic brain injury, BPH, hyperlipidemia, mild thrombocytopenia, and (possibly posttraumatic) parkinsonism on Sinemet comes in for report of diarrhea.  He comes in today reporting watery painless diarrhea on nearly an hourly basis starting a couple days ago with prodrome of malaise without fever.  Denies blood in the urine nausea vomiting and is tolerating p.o. well.  Weight if anything is up today from prior.  At time of last exam he was troubled by back pain and arm pain particular when supine, EMG consistent with carpal tunnel right greater than left trying nocturnal splints and saw hand surgeon appoint with hand surgeon who offered cortisone injection, not yet completed.  Has been working with Dr. Leiva in neurology who organized a DaTscan.  Unfortunately patient developed right low back and buttock pain radiating down posterior thigh,without antecedent trauma, with MRI showing moderate to severe lateral foraminal narrowing L4-L5 L5-S1.  This prevents him from lying flat long enough for a REJI scan.

## 2024-01-03 LAB — GI PCR PANEL: NOT DETECTED

## 2024-01-31 NOTE — HISTORY OF PRESENT ILLNESS
[FreeTextEntry1] : Report visit [de-identified] : Most pleasant 77-year-old white male retired  with history of traumatic brain injury, BPH, hyperlipidemia, mild thrombocytopenia, and parkinsonism on Sinemet comes in for report visit.  At time of last exam he was troubled by back pain and arm pain particular when supine, EMG consistent with carpal tunnel right greater than left trying nocturnal splints and plans to follow up with hand surgeon.  Has been working with Dr. Leiva in neurology who organized a DaTscan.  Unfortunately patient developed right low back and buttock pain radiating down posterior thigh,without antecedent trauma, with MRI showing moderate to severe lateral foraminal narrowing L4-L5 L5-S1. Trial NSAIDs+APAP unsuccessful, neurology made aware to enable exploration of other strategies ?anesthesia vs narcotics. Continues on sinemet, seemingly with some benefit.

## 2024-01-31 NOTE — ASSESSMENT
[FreeTextEntry1] : *Traumatic brain injury complicated by intracranial bleed leading to left temporal encephalomalacia And parkinsonism.  No seizure activity.   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  Seemingly improved on Sinemet.  *Right low back pain in supine position.   Describes right  buttock pain radiating  down posterior thigh while supine.Puzzling presentation.  Atraumatic.  Provocative maneuvers such as   Femoral head compression trochanteric palpation SI joint loading,lumbar extension and flexion do not worsen the discomfort and the pain is resolved with weightbearing.  Recommend he trial Tylenol 1000 mg and ibuprofen 600 mg and lay flat.  Can trial gabapentin. He is to let me know how the combination works.   Plan CRP, lumbar hip and pelvis plain films.  *Nephrolithiasis.  twice in the 2000's both requiring lithotripsy, no recurrence since.  *BPH.  Proscar Flomax twice daily with 2-4 times nightly nocturia.  Sees Dr. Calderon twice a year.  *Hyperlipidemia.  Crestor 10 mg with reassuring lipid panel September 2022.  TTE 2022 mild LVH patient not on ACE inhibitor, normotensive, with some degree of RV dilatation without history of pulmonary embolism.   2022 Adenosine sestamibi  without evidence of infarct or ischemia.2023 CT coronary angiogram  with less than 24% stenosis in all vessels except the LAD ostia 25 to 49%.  Holter 2023 without arrhythmia.. Cardiology following.  *  Bilateral upper extremity paresthesia. CRP normal. Very positional consistent with impingement.  Some diminution in pulse with passive abduction  external rotation suggest possible thoracic outlet.  I cannot reproduce his pain by compression of the median or ulnar nerve though the history is most suggestive of such.  He seemed quite worried by this however I suggested that he simply try to position his arms more comfortably while asleep, use the splints, and f/u w hand for possible release procedure.  *Impaired fasting glucose.  A1c 5.7% 2022.  Update.  *Chronic mild thrombocytopenia.  1 30-1 50, with family history with thrombocytopenia as well.  Differential otherwise normal.  Update CBC.  Has not been screened for HCV HIV, somewhat unusual in this age group but deemed an indicator condition so we will obtain.  *Remote right T12 shingles, early 2000's.  Recommended Shingrix.  *Marital discord.  Patient thinks his wife drinks too much and becomes more irritable and short tempered and this causes him considerable distress.  She responds that she likes the way it initially makes her feel though not the way it makes her behave.  (See her note).  They tried counseling without benefit in the past.  *  Routine adult health maintenance.  HIV, Hep C, TSH CMP vitamin D unremarkable, 8/2023.  Vaccination: Patient declines all vaccinations.  Of concern, he had shingles in the right lower quadrant of his abdomen 10 to 15 years ago never received Shingrix.  Suggested he pursue this at his pharmacy.  It was a pleasure to visit with Mr. Dudley today.  Tried to answer his numerous questions as best I could.  Disposition follow-up for [facility] procedure.  Time 30 minutes

## 2024-05-19 ENCOUNTER — NON-APPOINTMENT (OUTPATIENT)
Age: 79
End: 2024-05-19

## 2024-05-20 ENCOUNTER — APPOINTMENT (OUTPATIENT)
Dept: NEUROLOGY | Facility: CLINIC | Age: 79
End: 2024-05-20
Payer: MEDICARE

## 2024-05-20 VITALS
DIASTOLIC BLOOD PRESSURE: 74 MMHG | BODY MASS INDEX: 24.24 KG/M2 | OXYGEN SATURATION: 97 % | HEART RATE: 75 BPM | SYSTOLIC BLOOD PRESSURE: 122 MMHG | HEIGHT: 64 IN | WEIGHT: 142 LBS | TEMPERATURE: 98.2 F

## 2024-05-20 VITALS
SYSTOLIC BLOOD PRESSURE: 122 MMHG | HEIGHT: 64 IN | OXYGEN SATURATION: 97 % | BODY MASS INDEX: 24.24 KG/M2 | TEMPERATURE: 98.2 F | HEART RATE: 75 BPM | DIASTOLIC BLOOD PRESSURE: 74 MMHG | WEIGHT: 142 LBS

## 2024-05-20 DIAGNOSIS — H81.90 UNSPECIFIED DISORDER OF VESTIBULAR FUNCTION, UNSPECIFIED EAR: ICD-10-CM

## 2024-05-20 DIAGNOSIS — S06.9XAA UNSPECIFIED INTRACRANIAL INJURY WITH LOSS OF CONSCIOUSNESS STATUS UNKNOWN, INITIAL ENCOUNTER: ICD-10-CM

## 2024-05-20 DIAGNOSIS — M54.16 RADICULOPATHY, LUMBAR REGION: ICD-10-CM

## 2024-05-20 DIAGNOSIS — G56.03 CARPAL TUNNEL SYNDROM,BILATERAL UPPER LIMBS: ICD-10-CM

## 2024-05-20 PROCEDURE — G2211 COMPLEX E/M VISIT ADD ON: CPT

## 2024-05-20 PROCEDURE — 99215 OFFICE O/P EST HI 40 MIN: CPT

## 2024-05-20 RX ORDER — VALACYCLOVIR 1 G/1
1 TABLET, FILM COATED ORAL
Qty: 8 | Refills: 1 | Status: COMPLETED | COMMUNITY
Start: 2023-09-22 | End: 2024-05-20

## 2024-05-20 RX ORDER — MECLIZINE HYDROCHLORIDE 12.5 MG/1
12.5 TABLET ORAL
Qty: 40 | Refills: 3 | Status: ACTIVE | COMMUNITY
Start: 2024-05-20 | End: 1900-01-01

## 2024-05-20 NOTE — HISTORY OF PRESENT ILLNESS
[FreeTextEntry1] : This patient is seen for an office visit.  He is accompanied by his wife.  He was last seen on 10/4/2023.  He has been having episodes of imbalance with change of head position.  He has a past history of vertigo and went through a course of vestibular therapy in [place].  At this juncture he is not having vertigo.  He has taken meclizine in the past with variable results.  He continues to have disuse of the right hand which is chronic.  His gait is improved though he does describe decreased right arm swing this is noted by his wife.  He has positional numbness paresthesia in both arms presumed to be carpal tunnel syndrome.  He has chronic lower back pain and sciatica variable when he lies flat for extended periods of time.  He takes carbidopa levodopa 25/100 mg 3 times daily.  He could not lie flat for DaTscan.

## 2024-05-20 NOTE — ASSESSMENT
[FreeTextEntry1] : Impression: This 78-year-old male patient is status post left temporal lobe traumatic hemorrhage on 5/28/2020 residual encephalomalacia on MRI.  He has parkinsonism predominating right upper extremity.  Rule out Parkinson's disease versus posttraumatic etiology.  He has a history of vertigo and he is now having positional related imbalance.  He has chronic lower back pain with spondylosis.  His bilateral upper extremity pain and paresthesia consistent with carpal tunnel syndrome.  Recommendations: Continue carbidopa levodopa 25/100 mg 3 times daily.  He could not lie flat for the DaTscan.  Reevaluation for vestibular therapy in [facility].  Movement disorders consult requested.  Consider F dopa PET/CT.  Office follow-up.

## 2024-05-20 NOTE — PHYSICAL EXAM
[FreeTextEntry1] : Head:  Normocephalic Neck: Supple.  Spine: Nontender negative straight leg raising.  Mental Status:  Alert Oriented X3 Speech normal and no aphasia or dysarthria.  Mild hypophonia.  Cranial Nerves:  PERRL, Visual Fields full  EOMI no diplopia no ptosis no nystagmus, V through XII intact.  Mild facial masking.  Positive Myerson sign.  Motor: Mild right upper extremity greater than left cogwheeling and decreased rapid movements without significant tremor.  DTRs: Symmetric and 2+.  Plantars flexor.  Sensory:  Normal testing with pin light touch.  Gait: Decreased right arm swing.

## 2024-06-07 DIAGNOSIS — N40.0 BENIGN PROSTATIC HYPERPLASIA WITHOUT LOWER URINARY TRACT SYMPMS: ICD-10-CM

## 2024-06-07 DIAGNOSIS — R20.2 ANESTHESIA OF SKIN: ICD-10-CM

## 2024-06-07 DIAGNOSIS — D69.6 THROMBOCYTOPENIA, UNSPECIFIED: ICD-10-CM

## 2024-06-07 DIAGNOSIS — Z00.00 ENCOUNTER FOR GENERAL ADULT MEDICAL EXAMINATION W/OUT ABNORMAL FINDINGS: ICD-10-CM

## 2024-06-07 DIAGNOSIS — G20.A1 PARKINSON'S DISEASE WITHOUT DYSKINESIA, WITHOUT MENTION OF FLUCTUATIONS: ICD-10-CM

## 2024-06-07 DIAGNOSIS — H61.21 IMPACTED CERUMEN, RIGHT EAR: ICD-10-CM

## 2024-06-07 DIAGNOSIS — E78.5 HYPERLIPIDEMIA, UNSPECIFIED: ICD-10-CM

## 2024-06-07 DIAGNOSIS — R20.0 ANESTHESIA OF SKIN: ICD-10-CM

## 2024-06-10 ENCOUNTER — APPOINTMENT (OUTPATIENT)
Dept: INTERNAL MEDICINE | Facility: CLINIC | Age: 79
End: 2024-06-10
Payer: MEDICARE

## 2024-06-10 VITALS
TEMPERATURE: 97.1 F | HEART RATE: 81 BPM | RESPIRATION RATE: 14 BRPM | HEIGHT: 64 IN | BODY MASS INDEX: 24.41 KG/M2 | DIASTOLIC BLOOD PRESSURE: 74 MMHG | SYSTOLIC BLOOD PRESSURE: 120 MMHG | OXYGEN SATURATION: 97 % | WEIGHT: 143 LBS

## 2024-06-10 DIAGNOSIS — H61.20 IMPACTED CERUMEN, UNSPECIFIED EAR: ICD-10-CM

## 2024-06-10 PROBLEM — G20.A1 PARKINSON'S DISEASE: Status: ACTIVE | Noted: 2021-04-08

## 2024-06-10 PROBLEM — H61.21 IMPACTED CERUMEN OF RIGHT EAR: Status: ACTIVE | Noted: 2021-06-01

## 2024-06-10 PROBLEM — N40.0 BPH (BENIGN PROSTATIC HYPERPLASIA): Status: ACTIVE | Noted: 2021-02-04

## 2024-06-10 PROBLEM — D69.6 THROMBOCYTOPENIA: Status: ACTIVE | Noted: 2023-08-08

## 2024-06-10 PROBLEM — E78.5 HYPERLIPIDEMIA, UNSPECIFIED HYPERLIPIDEMIA TYPE: Status: ACTIVE | Noted: 2017-12-01

## 2024-06-10 PROBLEM — R20.0 NUMBNESS AND TINGLING IN BOTH HANDS: Status: ACTIVE | Noted: 2023-08-09

## 2024-06-10 PROCEDURE — 99211 OFF/OP EST MAY X REQ PHY/QHP: CPT

## 2024-06-12 NOTE — HISTORY OF PRESENT ILLNESS
[FreeTextEntry8] : Most pleasant 78-year-old white male retired  with history of traumatic brain injury, BPH, hyperlipidemia, mild thrombocytopenia, and (possibly posttraumatic) parkinsonism on Sinemet comes in for ear wax cleaning.

## 2024-06-13 NOTE — PHYSICAL EXAM
[Normal] : affect was normal and insight and judgment were intact [de-identified] : Minimal amount of wax removed from Right ear, left ear canal is clear

## 2024-07-30 ENCOUNTER — RX RENEWAL (OUTPATIENT)
Age: 79
End: 2024-07-30

## 2024-09-09 ENCOUNTER — APPOINTMENT (OUTPATIENT)
Dept: INTERNAL MEDICINE | Facility: CLINIC | Age: 79
End: 2024-09-09
Payer: MEDICARE

## 2024-09-09 VITALS
TEMPERATURE: 97.8 F | RESPIRATION RATE: 15 BRPM | SYSTOLIC BLOOD PRESSURE: 112 MMHG | HEART RATE: 76 BPM | BODY MASS INDEX: 25.1 KG/M2 | OXYGEN SATURATION: 96 % | HEIGHT: 64 IN | WEIGHT: 147 LBS | DIASTOLIC BLOOD PRESSURE: 76 MMHG

## 2024-09-09 DIAGNOSIS — J04.0 ACUTE LARYNGITIS: ICD-10-CM

## 2024-09-09 PROCEDURE — 99212 OFFICE O/P EST SF 10 MIN: CPT

## 2024-09-09 NOTE — ASSESSMENT
[FreeTextEntry1] : *URTI, viral.  Recommended scheduled Tylenol Advil until symptoms have subsided.  *Traumatic brain injury complicated by intracranial bleed leading to left temporal encephalomalacia And parkinsonism. No seizure activity.  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 Seemingly improved on Sinemet.  *Right low back pain in supine position. Describes right buttock pain radiating down posterior thigh while supine.Puzzling presentation. Atraumatic. Provocative maneuvers such as Femoral head compression trochanteric palpation SI joint loading,lumbar extension and flexion do not worsen the discomfort and the pain is resolved with weightbearing,  *Nephrolithiasis. twice in the 2000's both requiring lithotripsy, no recurrence since.  *BPH. Proscar Flomax twice daily with 2-4 times nightly nocturia. Sees Dr. Calderon twice a year.  *Hyperlipidemia. Crestor 10 mg with reassuring lipid panel September 2022. TTE 2022 mild LVH patient not on ACE inhibitor, normotensive, with some degree of RV dilatation without history of pulmonary embolism. 2022 Adenosine sestamibi without evidence of infarct or ischemia.2023 CT coronary angiogram  with less than 24% stenosis in all vessels except the LAD ostia 25 to 49%. Holter 2023 without arrhythmia. Cardiology following.  * Bilateral upper extremity paresthesia. Very positional consistent with impingement.  Has EMG proven carpal tunnel.  Also some relation to head position.  C-spine plain films showed advanced multilevel degenerative disc and joint disease.  Therefore possible cervical radicular component as well.  Saw hand surgeon last year, opted for conservative management.   *Impaired fasting glucose. A1c 5.7% 2022. Update.  *Chronic mild thrombocytopenia. 1 30-1 50, with family history with thrombocytopenia as well. Differential otherwise normal. Update CBC. negative screen 2023 for HCV HIV.  *Remote right T12 shingles, early 2000's. Recommended Shingrix.  *Marital discord. Patient thinks his wife drinks too much and becomes more irritable and short tempered and this causes him considerable distress. She responds that she likes the way it initially makes her feel though not the way it makes her behave. (See her note). They tried counseling without benefit in the past.  * Routine adult health maintenance. Due for TSH CMP vitamin D. Vaccination: Patient declines all vaccinations. Of concern, he had shingles in the right lower quadrant of his abdomen 10 to 15 years ago never received Shingrix. Suggested he pursue this at his pharmacy.  It was a pleasure to visit with Meagan Luis Enrique today. Tried to answer his numerous questions as best I could.  Disposition follow-up after labs in a month for CHET

## 2024-09-09 NOTE — HISTORY OF PRESENT ILLNESS
[FreeTextEntry8] : Most pleasant 78-year-old white male retired  with history of traumatic brain injury, BPH, hyperlipidemia, mild thrombocytopenia, and (possibly posttraumatic) parkinsonism on Sinemet comes in for sore throat loss of voice starting 4 to 5 days ago, without fever productive cough rhinorrhea wheeze shortness of breath.  The throat was so sore on Sunday that they decided to come in today.  Fortunately today the pain is much improved, using Robitussin spray.

## 2024-09-09 NOTE — PHYSICAL EXAM
[No Lymphadenopathy] : no lymphadenopathy [Normal] : affect was normal and insight and judgment were intact [de-identified] : Hoarse voice.

## 2024-10-22 ENCOUNTER — APPOINTMENT (OUTPATIENT)
Dept: INTERNAL MEDICINE | Facility: CLINIC | Age: 79
End: 2024-10-22
Payer: MEDICARE

## 2024-10-22 VITALS
HEIGHT: 64 IN | OXYGEN SATURATION: 97 % | BODY MASS INDEX: 24.41 KG/M2 | WEIGHT: 143 LBS | SYSTOLIC BLOOD PRESSURE: 118 MMHG | TEMPERATURE: 98 F | DIASTOLIC BLOOD PRESSURE: 74 MMHG | RESPIRATION RATE: 16 BRPM | HEART RATE: 77 BPM

## 2024-10-22 DIAGNOSIS — Z00.00 ENCOUNTER FOR GENERAL ADULT MEDICAL EXAMINATION W/OUT ABNORMAL FINDINGS: ICD-10-CM

## 2024-10-22 DIAGNOSIS — E78.5 HYPERLIPIDEMIA, UNSPECIFIED: ICD-10-CM

## 2024-10-22 DIAGNOSIS — D69.6 THROMBOCYTOPENIA, UNSPECIFIED: ICD-10-CM

## 2024-10-22 DIAGNOSIS — N40.0 BENIGN PROSTATIC HYPERPLASIA WITHOUT LOWER URINARY TRACT SYMPMS: ICD-10-CM

## 2024-10-22 PROCEDURE — G0439: CPT

## 2024-10-30 ENCOUNTER — RX RENEWAL (OUTPATIENT)
Age: 79
End: 2024-10-30

## 2024-11-08 ENCOUNTER — APPOINTMENT (OUTPATIENT)
Dept: NEUROLOGY | Facility: CLINIC | Age: 79
End: 2024-11-08
Payer: MEDICARE

## 2024-11-08 VITALS
DIASTOLIC BLOOD PRESSURE: 85 MMHG | HEIGHT: 65 IN | BODY MASS INDEX: 23.32 KG/M2 | WEIGHT: 140 LBS | HEART RATE: 75 BPM | SYSTOLIC BLOOD PRESSURE: 162 MMHG

## 2024-11-08 PROCEDURE — 99215 OFFICE O/P EST HI 40 MIN: CPT

## 2024-11-20 ENCOUNTER — APPOINTMENT (OUTPATIENT)
Dept: NEUROLOGY | Facility: CLINIC | Age: 79
End: 2024-11-20
Payer: MEDICARE

## 2024-11-20 VITALS
BODY MASS INDEX: 23.32 KG/M2 | TEMPERATURE: 97.9 F | HEART RATE: 71 BPM | HEIGHT: 65 IN | WEIGHT: 140 LBS | SYSTOLIC BLOOD PRESSURE: 131 MMHG | OXYGEN SATURATION: 98 % | DIASTOLIC BLOOD PRESSURE: 79 MMHG

## 2024-11-20 DIAGNOSIS — H81.90 UNSPECIFIED DISORDER OF VESTIBULAR FUNCTION, UNSPECIFIED EAR: ICD-10-CM

## 2024-11-20 DIAGNOSIS — G20.A1 PARKINSON'S DISEASE WITHOUT DYSKINESIA, WITHOUT MENTION OF FLUCTUATIONS: ICD-10-CM

## 2024-11-20 DIAGNOSIS — G56.03 CARPAL TUNNEL SYNDROM,BILATERAL UPPER LIMBS: ICD-10-CM

## 2024-11-20 DIAGNOSIS — S06.9XAA UNSPECIFIED INTRACRANIAL INJURY WITH LOSS OF CONSCIOUSNESS STATUS UNKNOWN, INITIAL ENCOUNTER: ICD-10-CM

## 2024-11-20 DIAGNOSIS — M54.16 RADICULOPATHY, LUMBAR REGION: ICD-10-CM

## 2024-11-20 PROCEDURE — G2211 COMPLEX E/M VISIT ADD ON: CPT

## 2024-11-20 PROCEDURE — 99215 OFFICE O/P EST HI 40 MIN: CPT

## 2024-11-20 RX ORDER — VITAMIN B COMPLEX
CAPSULE ORAL
Refills: 0 | Status: ACTIVE | COMMUNITY

## 2024-12-23 ENCOUNTER — APPOINTMENT (OUTPATIENT)
Dept: INTERNAL MEDICINE | Facility: CLINIC | Age: 79
End: 2024-12-23

## 2024-12-24 ENCOUNTER — APPOINTMENT (OUTPATIENT)
Dept: OTOLARYNGOLOGY | Facility: CLINIC | Age: 79
End: 2024-12-24
Payer: MEDICARE

## 2024-12-24 VITALS
BODY MASS INDEX: 23.32 KG/M2 | WEIGHT: 140 LBS | HEART RATE: 73 BPM | OXYGEN SATURATION: 97 % | TEMPERATURE: 97.3 F | HEIGHT: 65 IN

## 2024-12-24 DIAGNOSIS — H61.21 IMPACTED CERUMEN, RIGHT EAR: ICD-10-CM

## 2024-12-24 PROCEDURE — 99203 OFFICE O/P NEW LOW 30 MIN: CPT | Mod: 25

## 2024-12-24 PROCEDURE — 69210 REMOVE IMPACTED EAR WAX UNI: CPT | Mod: RT

## 2025-01-05 NOTE — DISCHARGE NOTE NURSING/CASE MANAGEMENT/SOCIAL WORK - NSDCDPATPORTLINK_GEN_ALL_CORE
You can access the [facility] Patient Portal, offered by [facility], by registering with the following website: [email or web address]

## 2025-04-01 ENCOUNTER — APPOINTMENT (OUTPATIENT)
Dept: INTERNAL MEDICINE | Facility: CLINIC | Age: 80
End: 2025-04-01
Payer: MEDICARE

## 2025-04-01 VITALS
OXYGEN SATURATION: 98 % | HEIGHT: 65 IN | DIASTOLIC BLOOD PRESSURE: 60 MMHG | HEART RATE: 72 BPM | WEIGHT: 143 LBS | RESPIRATION RATE: 16 BRPM | SYSTOLIC BLOOD PRESSURE: 114 MMHG | BODY MASS INDEX: 23.82 KG/M2 | TEMPERATURE: 97.6 F

## 2025-04-01 DIAGNOSIS — N40.0 BENIGN PROSTATIC HYPERPLASIA WITHOUT LOWER URINARY TRACT SYMPMS: ICD-10-CM

## 2025-04-01 DIAGNOSIS — I35.0 NONRHEUMATIC AORTIC (VALVE) STENOSIS: ICD-10-CM

## 2025-04-01 PROCEDURE — 99214 OFFICE O/P EST MOD 30 MIN: CPT

## 2025-04-16 ENCOUNTER — NON-APPOINTMENT (OUTPATIENT)
Age: 80
End: 2025-04-16

## 2025-04-16 ENCOUNTER — APPOINTMENT (OUTPATIENT)
Dept: CARDIOLOGY | Facility: CLINIC | Age: 80
End: 2025-04-16

## 2025-04-16 VITALS
DIASTOLIC BLOOD PRESSURE: 70 MMHG | HEIGHT: 65 IN | WEIGHT: 145 LBS | OXYGEN SATURATION: 98 % | HEART RATE: 61 BPM | BODY MASS INDEX: 24.16 KG/M2 | SYSTOLIC BLOOD PRESSURE: 118 MMHG

## 2025-04-16 DIAGNOSIS — I35.0 NONRHEUMATIC AORTIC (VALVE) STENOSIS: ICD-10-CM

## 2025-04-16 PROCEDURE — 99214 OFFICE O/P EST MOD 30 MIN: CPT | Mod: 25

## 2025-04-16 PROCEDURE — 93000 ELECTROCARDIOGRAM COMPLETE: CPT

## 2025-04-29 ENCOUNTER — APPOINTMENT (OUTPATIENT)
Dept: CARDIOLOGY | Facility: CLINIC | Age: 80
End: 2025-04-29
Payer: MEDICARE

## 2025-04-29 PROCEDURE — 93306 TTE W/DOPPLER COMPLETE: CPT

## 2025-05-07 DIAGNOSIS — I35.0 NONRHEUMATIC AORTIC (VALVE) STENOSIS: ICD-10-CM

## 2025-05-12 ENCOUNTER — APPOINTMENT (OUTPATIENT)
Dept: NEUROLOGY | Facility: CLINIC | Age: 80
End: 2025-05-12
Payer: MEDICARE

## 2025-05-12 VITALS
OXYGEN SATURATION: 98 % | BODY MASS INDEX: 24.16 KG/M2 | HEART RATE: 53 BPM | TEMPERATURE: 97.7 F | SYSTOLIC BLOOD PRESSURE: 160 MMHG | DIASTOLIC BLOOD PRESSURE: 72 MMHG | HEIGHT: 65 IN | WEIGHT: 145 LBS

## 2025-05-12 DIAGNOSIS — M54.16 RADICULOPATHY, LUMBAR REGION: ICD-10-CM

## 2025-05-12 DIAGNOSIS — G56.03 CARPAL TUNNEL SYNDROM,BILATERAL UPPER LIMBS: ICD-10-CM

## 2025-05-12 DIAGNOSIS — S06.9XAA UNSPECIFIED INTRACRANIAL INJURY WITH LOSS OF CONSCIOUSNESS STATUS UNKNOWN, INITIAL ENCOUNTER: ICD-10-CM

## 2025-05-12 DIAGNOSIS — R42 DIZZINESS AND GIDDINESS: ICD-10-CM

## 2025-05-12 DIAGNOSIS — G20.A1 PARKINSON'S DISEASE WITHOUT DYSKINESIA, WITHOUT MENTION OF FLUCTUATIONS: ICD-10-CM

## 2025-05-12 PROCEDURE — 99214 OFFICE O/P EST MOD 30 MIN: CPT

## 2025-05-12 PROCEDURE — G2211 COMPLEX E/M VISIT ADD ON: CPT

## 2025-08-22 ENCOUNTER — APPOINTMENT (OUTPATIENT)
Dept: INTERNAL MEDICINE | Facility: CLINIC | Age: 80
End: 2025-08-22

## 2025-08-22 VITALS
HEART RATE: 73 BPM | DIASTOLIC BLOOD PRESSURE: 70 MMHG | HEIGHT: 65 IN | OXYGEN SATURATION: 97 % | WEIGHT: 144 LBS | BODY MASS INDEX: 23.99 KG/M2 | RESPIRATION RATE: 14 BRPM | TEMPERATURE: 98.2 F | SYSTOLIC BLOOD PRESSURE: 154 MMHG

## 2025-08-22 DIAGNOSIS — Z00.00 ENCOUNTER FOR GENERAL ADULT MEDICAL EXAMINATION W/OUT ABNORMAL FINDINGS: ICD-10-CM

## 2025-08-22 DIAGNOSIS — I95.1 PARKINSONISM, UNSPECIFIED: ICD-10-CM

## 2025-08-22 DIAGNOSIS — G20.C PARKINSONISM, UNSPECIFIED: ICD-10-CM

## 2025-08-22 DIAGNOSIS — I61.9 NONTRAUMATIC INTRACEREBRAL HEMORRHAGE, UNSPECIFIED: ICD-10-CM

## 2025-08-22 DIAGNOSIS — G20.A1 PARKINSON'S DISEASE WITHOUT DYSKINESIA, WITHOUT MENTION OF FLUCTUATIONS: ICD-10-CM

## 2025-08-22 DIAGNOSIS — G25.0 ESSENTIAL TREMOR: ICD-10-CM

## 2025-08-22 DIAGNOSIS — G93.89 OTHER SPECIFIED DISORDERS OF BRAIN: ICD-10-CM

## 2025-08-22 DIAGNOSIS — E78.5 HYPERLIPIDEMIA, UNSPECIFIED: ICD-10-CM

## 2025-08-22 PROCEDURE — 99214 OFFICE O/P EST MOD 30 MIN: CPT

## 2025-08-26 ENCOUNTER — APPOINTMENT (OUTPATIENT)
Dept: ORTHOPEDIC SURGERY | Facility: CLINIC | Age: 80
End: 2025-08-26
Payer: MEDICARE

## 2025-08-26 VITALS — HEIGHT: 65 IN | BODY MASS INDEX: 24.16 KG/M2 | WEIGHT: 145 LBS

## 2025-08-26 DIAGNOSIS — M65.342 TRIGGER FINGER, LEFT RING FINGER: ICD-10-CM

## 2025-08-26 PROCEDURE — 99213 OFFICE O/P EST LOW 20 MIN: CPT

## 2025-09-02 ENCOUNTER — APPOINTMENT (OUTPATIENT)
Dept: NEUROLOGY | Facility: CLINIC | Age: 80
End: 2025-09-02
Payer: MEDICARE

## 2025-09-02 VITALS
HEART RATE: 75 BPM | DIASTOLIC BLOOD PRESSURE: 67 MMHG | WEIGHT: 145 LBS | BODY MASS INDEX: 24.16 KG/M2 | SYSTOLIC BLOOD PRESSURE: 117 MMHG | HEIGHT: 65 IN

## 2025-09-02 DIAGNOSIS — I95.1 ORTHOSTATIC HYPOTENSION: ICD-10-CM

## 2025-09-02 PROCEDURE — 99214 OFFICE O/P EST MOD 30 MIN: CPT

## 2025-09-02 RX ORDER — NORMAL SALT TABLETS 1 G/G
1 TABLET ORAL
Qty: 60 | Refills: 5 | Status: ACTIVE | COMMUNITY
Start: 2025-09-02 | End: 1900-01-01